# Patient Record
Sex: FEMALE | Race: WHITE | NOT HISPANIC OR LATINO | Employment: OTHER | URBAN - METROPOLITAN AREA
[De-identification: names, ages, dates, MRNs, and addresses within clinical notes are randomized per-mention and may not be internally consistent; named-entity substitution may affect disease eponyms.]

---

## 2017-09-05 ENCOUNTER — HOSPITAL ENCOUNTER (OUTPATIENT)
Dept: MAMMOGRAPHY | Facility: HOSPITAL | Age: 72
Discharge: HOME | End: 2017-09-05
Attending: OBSTETRICS & GYNECOLOGY

## 2017-09-05 DIAGNOSIS — Z12.31 VISIT FOR SCREENING MAMMOGRAM: ICD-10-CM

## 2018-09-14 ENCOUNTER — HOSPITAL ENCOUNTER (OUTPATIENT)
Dept: MAMMOGRAPHY | Facility: CLINIC | Age: 73
Discharge: HOME | End: 2018-09-14
Attending: OBSTETRICS & GYNECOLOGY

## 2018-09-14 DIAGNOSIS — Z12.31 VISIT FOR SCREENING MAMMOGRAM: ICD-10-CM

## 2018-10-30 ENCOUNTER — HOSPITAL ENCOUNTER (INPATIENT)
Facility: CLINIC | Age: 73
LOS: 1 days | Discharge: HOME | DRG: 812 | End: 2018-11-01
Attending: NURSE PRACTITIONER | Admitting: FAMILY MEDICINE
Payer: MEDICARE

## 2018-10-30 ENCOUNTER — APPOINTMENT (OUTPATIENT)
Dept: GENERAL RADIOLOGY | Facility: CLINIC | Age: 73
DRG: 812 | End: 2018-10-30
Attending: NURSE PRACTITIONER
Payer: MEDICARE

## 2018-10-30 ENCOUNTER — APPOINTMENT (OUTPATIENT)
Dept: CT IMAGING | Facility: CLINIC | Age: 73
DRG: 812 | End: 2018-10-30
Attending: NURSE PRACTITIONER
Payer: MEDICARE

## 2018-10-30 DIAGNOSIS — S80.12XA TRAUMATIC HEMATOMA OF LEFT LOWER LEG, INITIAL ENCOUNTER: Primary | ICD-10-CM

## 2018-10-30 DIAGNOSIS — R55 SYNCOPE, UNSPECIFIED SYNCOPE TYPE: ICD-10-CM
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
## 2018-10-30 LAB
ANION GAP SERPL CALCULATED.3IONS-SCNC: 8 MMOL/L (ref 3–14)
BASOPHILS # BLD AUTO: 0 10E9/L (ref 0–0.2)
BASOPHILS NFR BLD AUTO: 0.4 %
BUN SERPL-MCNC: 15 MG/DL (ref 7–30)
CALCIUM SERPL-MCNC: 8 MG/DL (ref 8.5–10.1)
CHLORIDE SERPL-SCNC: 106 MMOL/L (ref 94–109)
CO2 SERPL-SCNC: 24 MMOL/L (ref 20–32)
CREAT SERPL-MCNC: 0.69 MG/DL (ref 0.52–1.04)
DIFFERENTIAL METHOD BLD: ABNORMAL
EOSINOPHIL # BLD AUTO: 0.1 10E9/L (ref 0–0.7)
EOSINOPHIL NFR BLD AUTO: 1 %
ERYTHROCYTE [DISTWIDTH] IN BLOOD BY AUTOMATED COUNT: 13.5 % (ref 10–15)
GFR SERPL CREATININE-BSD FRML MDRD: 83 ML/MIN/1.7M2
GLUCOSE SERPL-MCNC: 184 MG/DL (ref 70–99)
HCT VFR BLD AUTO: 34 % (ref 35–47)
HGB BLD-MCNC: 11.1 G/DL (ref 11.7–15.7)
IMM GRANULOCYTES # BLD: 0.1 10E9/L (ref 0–0.4)
IMM GRANULOCYTES NFR BLD: 0.5 %
LYMPHOCYTES # BLD AUTO: 2 10E9/L (ref 0.8–5.3)
LYMPHOCYTES NFR BLD AUTO: 17.9 %
MCH RBC QN AUTO: 27.8 PG (ref 26.5–33)
MCHC RBC AUTO-ENTMCNC: 32.6 G/DL (ref 31.5–36.5)
MCV RBC AUTO: 85 FL (ref 78–100)
MONOCYTES # BLD AUTO: 0.6 10E9/L (ref 0–1.3)
MONOCYTES NFR BLD AUTO: 5.1 %
NEUTROPHILS # BLD AUTO: 8.2 10E9/L (ref 1.6–8.3)
NEUTROPHILS NFR BLD AUTO: 75.1 %
NRBC # BLD AUTO: 0 10*3/UL
NRBC BLD AUTO-RTO: 0 /100
PLATELET # BLD AUTO: 271 10E9/L (ref 150–450)
POTASSIUM SERPL-SCNC: 4 MMOL/L (ref 3.4–5.3)
RBC # BLD AUTO: 4 10E12/L (ref 3.8–5.2)
SODIUM SERPL-SCNC: 138 MMOL/L (ref 133–144)
WBC # BLD AUTO: 10.9 10E9/L (ref 4–11)

## 2018-10-30 PROCEDURE — 25000128 H RX IP 250 OP 636: Performed by: FAMILY MEDICINE

## 2018-10-30 PROCEDURE — 73502 X-RAY EXAM HIP UNI 2-3 VIEWS: CPT

## 2018-10-30 PROCEDURE — G0378 HOSPITAL OBSERVATION PER HR: HCPCS

## 2018-10-30 PROCEDURE — 87640 STAPH A DNA AMP PROBE: CPT | Performed by: FAMILY MEDICINE

## 2018-10-30 PROCEDURE — 85025 COMPLETE CBC W/AUTO DIFF WBC: CPT | Performed by: NURSE PRACTITIONER

## 2018-10-30 PROCEDURE — 99285 EMERGENCY DEPT VISIT HI MDM: CPT | Mod: 25 | Performed by: NURSE PRACTITIONER

## 2018-10-30 PROCEDURE — A9270 NON-COVERED ITEM OR SERVICE: HCPCS | Mod: GY | Performed by: FAMILY MEDICINE

## 2018-10-30 PROCEDURE — 87641 MR-STAPH DNA AMP PROBE: CPT | Performed by: FAMILY MEDICINE

## 2018-10-30 PROCEDURE — 96361 HYDRATE IV INFUSION ADD-ON: CPT

## 2018-10-30 PROCEDURE — 80048 BASIC METABOLIC PNL TOTAL CA: CPT | Performed by: NURSE PRACTITIONER

## 2018-10-30 PROCEDURE — 99219 ZZC INITIAL OBSERVATION CARE,LEVL II: CPT | Performed by: FAMILY MEDICINE

## 2018-10-30 PROCEDURE — A9270 NON-COVERED ITEM OR SERVICE: HCPCS | Mod: GY | Performed by: NURSE PRACTITIONER

## 2018-10-30 PROCEDURE — 99285 EMERGENCY DEPT VISIT HI MDM: CPT | Mod: Z6 | Performed by: NURSE PRACTITIONER

## 2018-10-30 PROCEDURE — 25000132 ZZH RX MED GY IP 250 OP 250 PS 637: Mod: GY | Performed by: NURSE PRACTITIONER

## 2018-10-30 PROCEDURE — 25000132 ZZH RX MED GY IP 250 OP 250 PS 637: Mod: GY | Performed by: FAMILY MEDICINE

## 2018-10-30 PROCEDURE — 25000128 H RX IP 250 OP 636: Performed by: NURSE PRACTITIONER

## 2018-10-30 PROCEDURE — 72192 CT PELVIS W/O DYE: CPT

## 2018-10-30 PROCEDURE — 96374 THER/PROPH/DIAG INJ IV PUSH: CPT | Performed by: NURSE PRACTITIONER

## 2018-10-30 PROCEDURE — 96361 HYDRATE IV INFUSION ADD-ON: CPT | Performed by: NURSE PRACTITIONER

## 2018-10-30 PROCEDURE — 96376 TX/PRO/DX INJ SAME DRUG ADON: CPT

## 2018-10-30 RX ORDER — PANTOPRAZOLE SODIUM 40 MG/1
40 TABLET, DELAYED RELEASE ORAL AT BEDTIME
Status: DISCONTINUED | OUTPATIENT
Start: 2018-10-30 | End: 2018-11-01 | Stop reason: HOSPADM

## 2018-10-30 RX ORDER — OXYCODONE HYDROCHLORIDE 5 MG/1
5 TABLET ORAL ONCE
Status: COMPLETED | OUTPATIENT
Start: 2018-10-30 | End: 2018-10-30

## 2018-10-30 RX ORDER — POLYETHYLENE GLYCOL 3350 17 G/17G
17 POWDER, FOR SOLUTION ORAL DAILY PRN
Status: DISCONTINUED | OUTPATIENT
Start: 2018-10-30 | End: 2018-11-01 | Stop reason: HOSPADM

## 2018-10-30 RX ORDER — ONDANSETRON 2 MG/ML
4 INJECTION INTRAMUSCULAR; INTRAVENOUS EVERY 6 HOURS PRN
Status: DISCONTINUED | OUTPATIENT
Start: 2018-10-30 | End: 2018-11-01 | Stop reason: HOSPADM

## 2018-10-30 RX ORDER — NALOXONE HYDROCHLORIDE 0.4 MG/ML
.1-.4 INJECTION, SOLUTION INTRAMUSCULAR; INTRAVENOUS; SUBCUTANEOUS
Status: DISCONTINUED | OUTPATIENT
Start: 2018-10-30 | End: 2018-10-30

## 2018-10-30 RX ORDER — ACETAMINOPHEN 325 MG/1
975 TABLET ORAL ONCE
Status: COMPLETED | OUTPATIENT
Start: 2018-10-30 | End: 2018-10-30

## 2018-10-30 RX ORDER — PROCHLORPERAZINE 25 MG
12.5 SUPPOSITORY, RECTAL RECTAL EVERY 12 HOURS PRN
Status: DISCONTINUED | OUTPATIENT
Start: 2018-10-30 | End: 2018-11-01 | Stop reason: HOSPADM

## 2018-10-30 RX ORDER — SODIUM CHLORIDE, SODIUM LACTATE, POTASSIUM CHLORIDE, CALCIUM CHLORIDE 600; 310; 30; 20 MG/100ML; MG/100ML; MG/100ML; MG/100ML
INJECTION, SOLUTION INTRAVENOUS CONTINUOUS
Status: DISCONTINUED | OUTPATIENT
Start: 2018-10-30 | End: 2018-11-01 | Stop reason: HOSPADM

## 2018-10-30 RX ORDER — TRAMADOL HYDROCHLORIDE 50 MG/1
50 TABLET ORAL EVERY 6 HOURS PRN
Status: DISCONTINUED | OUTPATIENT
Start: 2018-10-30 | End: 2018-11-01 | Stop reason: HOSPADM

## 2018-10-30 RX ORDER — NALOXONE HYDROCHLORIDE 0.4 MG/ML
.1-.4 INJECTION, SOLUTION INTRAMUSCULAR; INTRAVENOUS; SUBCUTANEOUS
Status: DISCONTINUED | OUTPATIENT
Start: 2018-10-30 | End: 2018-11-01 | Stop reason: HOSPADM

## 2018-10-30 RX ORDER — CALCIUM CARBONATE 500 MG/1
500-1000 TABLET, CHEWABLE ORAL 3 TIMES DAILY PRN
Status: DISCONTINUED | OUTPATIENT
Start: 2018-10-30 | End: 2018-11-01 | Stop reason: HOSPADM

## 2018-10-30 RX ORDER — ACETAMINOPHEN 325 MG/1
650 TABLET ORAL EVERY 4 HOURS PRN
Status: DISCONTINUED | OUTPATIENT
Start: 2018-10-30 | End: 2018-11-01 | Stop reason: HOSPADM

## 2018-10-30 RX ORDER — KETOROLAC TROMETHAMINE 15 MG/ML
15 INJECTION, SOLUTION INTRAMUSCULAR; INTRAVENOUS ONCE
Status: COMPLETED | OUTPATIENT
Start: 2018-10-30 | End: 2018-10-30

## 2018-10-30 RX ORDER — PROCHLORPERAZINE MALEATE 5 MG
5 TABLET ORAL EVERY 6 HOURS PRN
Status: DISCONTINUED | OUTPATIENT
Start: 2018-10-30 | End: 2018-11-01 | Stop reason: HOSPADM

## 2018-10-30 RX ORDER — ONDANSETRON 4 MG/1
4 TABLET, ORALLY DISINTEGRATING ORAL EVERY 6 HOURS PRN
Status: DISCONTINUED | OUTPATIENT
Start: 2018-10-30 | End: 2018-11-01 | Stop reason: HOSPADM

## 2018-10-30 RX ORDER — PANTOPRAZOLE SODIUM 40 MG/1
40 TABLET, DELAYED RELEASE ORAL
Status: DISCONTINUED | OUTPATIENT
Start: 2018-10-31 | End: 2018-10-30

## 2018-10-30 RX ORDER — KETOROLAC TROMETHAMINE 15 MG/ML
15 INJECTION, SOLUTION INTRAMUSCULAR; INTRAVENOUS EVERY 6 HOURS PRN
Status: DISPENSED | OUTPATIENT
Start: 2018-10-30 | End: 2018-10-31

## 2018-10-30 RX ADMIN — SODIUM CHLORIDE, POTASSIUM CHLORIDE, SODIUM LACTATE AND CALCIUM CHLORIDE 1000 ML: 600; 310; 30; 20 INJECTION, SOLUTION INTRAVENOUS at 16:14

## 2018-10-30 RX ADMIN — OXYCODONE HYDROCHLORIDE 5 MG: 5 TABLET ORAL at 14:55

## 2018-10-30 RX ADMIN — PANTOPRAZOLE SODIUM 40 MG: 40 TABLET, DELAYED RELEASE ORAL at 20:59

## 2018-10-30 RX ADMIN — SODIUM CHLORIDE, POTASSIUM CHLORIDE, SODIUM LACTATE AND CALCIUM CHLORIDE: 600; 310; 30; 20 INJECTION, SOLUTION INTRAVENOUS at 21:27

## 2018-10-30 RX ADMIN — SODIUM CHLORIDE, POTASSIUM CHLORIDE, SODIUM LACTATE AND CALCIUM CHLORIDE 1000 ML: 600; 310; 30; 20 INJECTION, SOLUTION INTRAVENOUS at 18:57

## 2018-10-30 RX ADMIN — KETOROLAC TROMETHAMINE 15 MG: 15 INJECTION, SOLUTION INTRAMUSCULAR; INTRAVENOUS at 22:25

## 2018-10-30 RX ADMIN — KETOROLAC TROMETHAMINE 15 MG: 15 INJECTION, SOLUTION INTRAMUSCULAR; INTRAVENOUS at 19:01

## 2018-10-30 RX ADMIN — ACETAMINOPHEN 650 MG: 325 TABLET, FILM COATED ORAL at 18:57

## 2018-10-30 RX ADMIN — CALCIUM CARBONATE (ANTACID) CHEW TAB 500 MG 1000 MG: 500 CHEW TAB at 21:19

## 2018-10-30 RX ADMIN — ACETAMINOPHEN 650 MG: 325 TABLET, FILM COATED ORAL at 21:19

## 2018-10-30 ASSESSMENT — ENCOUNTER SYMPTOMS
WOUND: 1
COUGH: 0
CHILLS: 0
FEVER: 0
DIFFICULTY URINATING: 0
FATIGUE: 0
NAUSEA: 0
CONFUSION: 0
VOMITING: 0
ABDOMINAL PAIN: 0
DIARRHEA: 0
SORE THROAT: 0
DYSURIA: 0
WHEEZING: 0
CONSTIPATION: 0
SHORTNESS OF BREATH: 0
EYE PAIN: 0

## 2018-10-30 ASSESSMENT — PAIN DESCRIPTION - DESCRIPTORS: DESCRIPTORS: ACHING

## 2018-10-30 NOTE — ED NOTES
"1600 pt called and reports she is feeling nauseated, on arrival to room pt report \" I feel like I am dying\" pt pale and diaphoretic. BP decreased. Pt has increased swelling of hip. Pt changed into gown. Provider called to reassess. IV started labs collected and IVF hanging. BP improved. Will monitor  "

## 2018-10-30 NOTE — ED AVS SNAPSHOT
[facility] Emergency Department    [address] [place] [address]    Phone:  [phone]    Fax:  [phone]                                       Jeanna Smith   MRN: [id number]    Department:  [facility] Emergency Department   Date of Visit:  10/30/2018           Patient Information     Date Of Birth          1945        Your diagnoses for this visit were:     Traumatic hematoma of left lower leg, initial encounter        You were seen by Cayla Simental APRN CNP.      Follow-up Information     Follow up with Jung Pacheco In 3 days.    Specialty:  Internal Medicine    Why:  For wound re-check    Contact information:    [facility]  [address] [place] [address]  [phone]          Discharge Instructions         Lower Extremity Contusion  You have a contusion (bruise) of a lower extremity (leg, knee, ankle, foot, or toe). Symptoms include pain, swelling, and skin discoloration. No bones are broken. This injury may take from a few days to a few weeks to heal.  During that time, the bruise may change from reddish in color, to purple-blue, to green-yellow, to yellow-brown.  Home care    Unless another medicine was prescribed, you can take acetaminophen, ibuprofen, or naproxen to control pain. (If you have chronic liver or kidney disease or ever had a stomach ulcer or gastrointestinal bleeding, talk with your doctor before using these medicines.)    Elevate the injured area to reduce pain and swelling. As much as possible, sit or lie down with the injured area raised about the level of your heart. This is especially important during the first 48 hours.    Ice the injured area to help reduce pain and swelling. Wrap a cold source (ice pack or ice cubes in a plastic bag) in a thin towel. Apply to the bruised area for 20 minutes every 1 to 2 hours the first day. Continue this 3 to 4 times a day until the pain and swelling goes away.    If crutches have  been advised, do not bear full weight on the injured leg until you can do so without pain. You may return to sports when you are able to put full weight and impact on the injured leg without pain.  Follow up  Follow up with your healthcare provider or our staff as advised. Call if you are not improving within the next 1 to 2 weeks.  When to seek medical advice   Call your healthcare provider right away if any of these occur:    Increased pain or swelling    Foot or toes become cold, blue, numb or tingly    Signs of infection: Warmth, drainage, or increased redness or pain around the injury    Inability to move the injured area     Frequent bruising for unknown reasons  Date Last Reviewed: 2/1/2017 2000-2017 [employer]. [address] [place] [address], [place] [address]. All rights reserved. This information is not intended as a substitute for professional medical care. Always follow your healthcare professional's instructions.          24 Hour Appointment Hotline       To make an appointment at any [facility], call [phone] ([phone]). If you don't have a family doctor or clinic, we will help you find one. [facility] are conveniently located to serve the needs of you and your family.             Review of your medicines      Our records show that you are taking the medicines listed below. If these are incorrect, please call your family doctor or clinic.        Dose / Directions Last dose taken    VALERY THYROID 60 MG tablet   Generic drug:  thyroid        1 TABLET DAILY   Refills:  0        CALCIUM + D PO   Dose:  1 tablet        Take 1 tablet by mouth every evening   Refills:  0        IRON PO   Dose:  1 tablet        Take 1 tablet by mouth every evening   Refills:  0        MULTIVITAMIN TABS   OR        1 TABLET DAILY   Refills:  0        OMEGA-3 PO   Dose:  1 capsule        Take 1 capsule by mouth daily   Refills:  0        PANTOPRAZOLE SODIUM PO   Dose:  40 mg        Take 40 mg by  mouth every morning (before breakfast)   Refills:  0        vitamin B-Complex   Dose:  1 tablet        Take 1 tablet by mouth daily   Refills:  0                Procedures and tests performed during your visit     CBC with platelets differential    CT Pelvis Bone wo Contrast    Pelvis XR w/ unilateral hip left      Orders Needing Specimen Collection     None      Pending Results     No orders found from 10/28/2018 to 10/31/2018.            Pending Culture Results     No orders found from 10/28/2018 to 10/31/2018.            Pending Results Instructions     If you had any lab results that were not finalized at the time of your Discharge, you can call the ED Lab Result RN at [phone]. You will be contacted by this team for any positive Lab results or changes in treatment. The nurses are available 7 days a week from 10A to 6:30P.  You can leave a message 24 hours per day and they will return your call.        Test Results From Your Hospital Stay        10/30/2018  3:19 PM      Narrative     PELVIS AND HIP LEFT ONE VIEW October 30, 2018 2:11 PM     HISTORY: Fall this morning.     COMPARISON: None.        Impression     IMPRESSION: No evidence of acute fracture or subluxation. Joint spaces  appear fairly well-preserved.    BINH MEI MD         10/30/2018  4:27 PM      Component Results     Component Value Ref Range & Units Status    WBC 10.9 4.0 - 11.0 10e9/L Final    RBC Count 4.00 3.8 - 5.2 10e12/L Final    Hemoglobin 11.1 (L) 11.7 - 15.7 g/dL Final    Hematocrit 34.0 (L) 35.0 - 47.0 % Final    MCV 85 78 - 100 fl Final    MCH 27.8 26.5 - 33.0 pg Final    MCHC 32.6 31.5 - 36.5 g/dL Final    RDW 13.5 10.0 - 15.0 % Final    Platelet Count 271 150 - 450 10e9/L Final    Diff Method Automated Method  Final    % Neutrophils 75.1 % Final    % Lymphocytes 17.9 % Final    % Monocytes 5.1 % Final    % Eosinophils 1.0 % Final    % Basophils 0.4 % Final    % Immature Granulocytes 0.5 % Final    Nucleated RBCs 0 0 /100 Final     Absolute Neutrophil 8.2 1.6 - 8.3 10e9/L Final    Absolute Lymphocytes 2.0 0.8 - 5.3 10e9/L Final    Absolute Monocytes 0.6 0.0 - 1.3 10e9/L Final    Absolute Eosinophils 0.1 0.0 - 0.7 10e9/L Final    Absolute Basophils 0.0 0.0 - 0.2 10e9/L Final    Abs Immature Granulocytes 0.1 0 - 0.4 10e9/L Final    Absolute Nucleated RBC 0.0  Final         10/30/2018  6:14 PM      Narrative     CT PELVIS WITHOUT CONTRAST   10/30/2018 5:07 PM    HISTORY: Patient fell and injured left hip. There is a large hematoma  noted on left lateral thigh, measuring approximately 6 cm x 10 cm.  Evaluate for bleeding and fracture of left femur/pelvis.     COMPARISON: Radiographs earlier today.    TECHNIQUE: Routine CT imaging is performed through the pelvis without  contrast. Radiation dose for this scan was reduced using automated  exposure control, adjustment of the mA and/or kV according to patient  size, or iterative reconstruction technique.     FINDINGS: No fracture or acute osseous abnormality is demonstrated.  There are degenerative changes in both hips and the visualized lower  lumbar spine. There is a large soft tissue mass in the subcutaneous  tissues lateral to the proximal left femur. This measures  approximately 14 cm craniocaudal x 11 cm AP x 6.5 cm transverse and is  consistent with a hematoma. There is edema in the adjacent  subcutaneous tissues. There is calcification in the vascular  structures. No other soft tissue abnormality is identified.        Impression     IMPRESSION:  1. No fracture is demonstrated.  2. Large hematoma in the subcutaneous tissues lateral to the proximal  left femur measuring approximately 14 cm in greatest dimension.    IVETTE CROCKER MD                Thank you for choosing [facility]       Thank you for choosing [facility] for your care. Our goal is always to provide you with excellent care. Hearing back from our patients is one way we can continue to improve our services. Please take a few  "minutes to complete the written survey that you may receive in the mail after you visit with us. Thank you!        [facility] [employer] Information     icix lets you send messages to your doctor, view your test results, renew your prescriptions, schedule appointments and more. To sign up, go to [email or web address] . Click on \"Log in\" on the left side of the screen, which will take you to the Welcome page. Then click on \"Sign up Now\" on the right side of the page.     You will be asked to enter the access code listed below, as well as some personal information. Please follow the directions to create your username and password.     Your access code is: [email or web address]  Expires: 2019  6:31 PM     Your access code will  in 90 days. If you need help or a new code, please call your [facility] clinic or [phone].        Care EveryWhere ID     This is your Care EveryWhere ID. This could be used by other organizations to access your [facility] medical records  [id number]        Equal Access to Services     [facility]: Hadii gailna Choudhary, waaxda luzaheer, qaybta kaalmapj brody, low iniguez . So [facility] [phone].    ATENCIÓN: Si habla español, tiene a kohli disposición servicios gratuitos de asistencia lingüística. Llame al [phone].    We comply with applicable federal civil rights laws and [place] laws. We do not discriminate on the basis of race, color, national origin, age, disability, sex, sexual orientation, or gender identity.            After Visit Summary       This is your record. Keep this with you and show to your community pharmacist(s) and doctor(s) at your next visit.                  "

## 2018-10-30 NOTE — ED AVS SNAPSHOT
[facility] Emergency Department    [address] [address]    Phone:  [phone]    Fax:  [phone]                                       Jeanna Smith   MRN: [id number]    Department:  [facility] Emergency Department   Date of Visit:  10/30/2018           After Visit Summary Signature Page     I have received my discharge instructions, and my questions have been answered. I have discussed any challenges I see with this plan with the nurse or doctor.    ..........................................................................................................................................  Patient/Patient Representative Signature      ..........................................................................................................................................  Patient Representative Print Name and Relationship to Patient    ..................................................               ................................................  Date                                   Time    ..........................................................................................................................................  Reviewed by Signature/Title    ...................................................              ..............................................  Date                                               Time          22EPIC Rev 08/18

## 2018-10-30 NOTE — IP AVS SNAPSHOT
[facility] Intensive Care    [address] [address]    Phone:  [phone]    Fax:  [phone]                                       After Visit Summary   10/30/2018    Jeanna Smith    MRN: [id number]           After Visit Summary Signature Page     I have received my discharge instructions, and my questions have been answered. I have discussed any challenges I see with this plan with the nurse or doctor.    ..........................................................................................................................................  Patient/Patient Representative Signature      ..........................................................................................................................................  Patient Representative Print Name and Relationship to Patient    ..................................................               ................................................  Date                                   Time    ..........................................................................................................................................  Reviewed by Signature/Title    ...................................................              ..............................................  Date                                               Time          22EPIC Rev 08/18

## 2018-10-30 NOTE — IP AVS SNAPSHOT
MRN:[id number]                      After Visit Summary   10/30/2018    Jeanna Smith    MRN: [id number]           Thank you!     Thank you for choosing [facility] for your care. Our goal is always to provide you with excellent care. Hearing back from our patients is one way we can continue to improve our services. Please take a few minutes to complete the written survey that you may receive in the mail after you visit with us. Thank you!        Patient Information     Date Of Birth          1945        Designated Caregiver       Most Recent Value    Caregiver    Will someone help with your care after discharge? no      About your hospital stay     You were admitted on:  October 30, 2018 You last received care in the:  [facility] Intensive Care    You were discharged on:  November 1, 2018       Who to Call     For medical emergencies, please call 911.  For non-urgent questions about your medical care, please call your primary care provider or clinic, [phone]          Attending Provider     Provider Specialty    Cayla Simental APRN CNP Nurse Practitioner    Javy Palafox MD Family Practice    HoskinsRigoberto escobar MD Internal Medicine       Primary Care Provider Office Phone # Fax #    Jung Pacheco [phone]      After Care Instructions     Activity       Your activity upon discharge: activity as tolerated            Diet       Follow this diet upon discharge: Orders Placed This Encounter      Regular Diet Adult                  Follow-up Appointments     Follow-up and recommended labs and tests        Follow up with primary care provider, JUNG PACHCEO, within 1-2 weeks, for hospital follow- up. The following labs/tests are recommended: cbc.                  Further instructions from your care team         You are scheduled to follow up with your primary care provider, Dr. Jung Pacheco at 9:00am on Thursday, November 15th at the [facility].  [facility] [address] [facility] in [facility]. Please call if you have questions or need to reschedule at [phone]              Lower Extremity Contusion  You have a contusion (bruise) of a lower extremity (leg, knee, ankle, foot, or toe). Symptoms include pain, swelling, and skin discoloration. No bones are broken. This injury may take from a few days to a few weeks to heal.  During that time, the bruise may change from reddish in color, to purple-blue, to green-yellow, to yellow-brown.  Home care    Unless another medicine was prescribed, you can take acetaminophen, ibuprofen, or naproxen to control pain. (If you have chronic liver or kidney disease or ever had a stomach ulcer or gastrointestinal bleeding, talk with your doctor before using these medicines.)    Elevate the injured area to reduce pain and swelling. As much as possible, sit or lie down with the injured area raised about the level of your heart. This is especially important during the first 48 hours.    Ice the injured area to help reduce pain and swelling. Wrap a cold source (ice pack or ice cubes in a plastic bag) in a thin towel. Apply to the bruised area for 20 minutes every 1 to 2 hours the first day. Continue this 3 to 4 times a day until the pain and swelling goes away.    If crutches have been advised, do not bear full weight on the injured leg until you can do so without pain. You may return to sports when you are able to put full weight and impact on the injured leg without pain.  Follow up  Follow up with your healthcare provider or our staff as advised. Call if you are not improving within the next 1 to 2 weeks.  When to seek medical advice   Call your healthcare provider right away if any of these occur:    Increased pain or swelling    Foot or toes become cold, blue, numb or tingly    Signs of infection: Warmth, drainage, or increased redness or pain around the injury    Inability to move the injured area     Frequent  "bruising for unknown reasons  Date Last Reviewed: 2017-2017 [employer]. [address] [place] [address], [place] [address]. All rights reserved. This information is not intended as a substitute for professional medical care. Always follow your healthcare professional's instructions.          Pending Results     No orders found from 10/28/2018 to 10/31/2018.            Statement of Approval     Ordered          18 0825  I have reviewed and agree with all the recommendations and orders detailed in this document.  EFFECTIVE NOW     Approved and electronically signed by:  Rigoberto Hoskins MD             Admission Information     Date & Time Provider Department Dept. Phone    10/30/2018 Rigoberto Hoskins MD [facility] Intensive Care [phone]      Your Vitals Were     Blood Pressure Pulse Temperature Respirations Height Weight    119/59 (BP Location: Left arm) 77 98.8  F (37.1  C) (Oral) 16 1.727 m (5' 8\") 73.8 kg (162 lb 11.2 oz)    Pulse Oximetry BMI (Body Mass Index)                95% 24.74 kg/m2          [employer] Information     [employer] lets you send messages to your doctor, view your test results, renew your prescriptions, schedule appointments and more. To sign up, go to [email or web address] . Click on \"Log in\" on the left side of the screen, which will take you to the Welcome page. Then click on \"Sign up Now\" on the right side of the page.     You will be asked to enter the access code listed below, as well as some personal information. Please follow the directions to create your username and password.     Your access code is: [email or web address]  Expires: 2019  6:31 PM     Your access code will  in 90 days. If you need help or a new code, please call your [facility] clinic or [phone].        Care EveryWhere ID     This is your Care EveryWhere ID. This could be used by other organizations to access your [facility] medical records  [id number]        Equal Access to Services     [facility] " GAAR : Hadii aad lizzy pérezkathrin Choudhary, waaxda luqadaha, qaybta kasherinda reymundomellisapj, low streetnatanalejandro santiago. So [facility] [phone].    ATENCIÓN: Si habla español, tiene a kohli disposición servicios gratuitos de asistencia lingüística. Llame al [phone].    We comply with applicable federal civil rights laws and [place] laws. We do not discriminate on the basis of race, color, national origin, age, disability, sex, sexual orientation, or gender identity.               Review of your medicines      CONTINUE these medicines which have NOT CHANGED        Dose / Directions    ARMOUR THYROID 60 MG tablet   Generic drug:  thyroid        1 TABLET DAILY   Refills:  0       CALCIUM + D PO        Dose:  1 tablet   Take 1 tablet by mouth every evening   Refills:  0       IRON PO        Dose:  1 tablet   Take 1 tablet by mouth every evening   Refills:  0       MULTIVITAMIN TABS   OR        1 TABLET DAILY   Refills:  0       OMEGA-3 PO        Dose:  1 capsule   Take 1 capsule by mouth daily   Refills:  0       PANTOPRAZOLE SODIUM PO        Dose:  40 mg   Take 40 mg by mouth every morning (before breakfast)   Refills:  0       vitamin B-Complex        Dose:  1 tablet   Take 1 tablet by mouth daily   Refills:  0                Protect others around you: Learn how to safely use, store and throw away your medicines at www.disposemymeds.org.             Medication List: This is a list of all your medications and when to take them. Check marks below indicate your daily home schedule. Keep this list as a reference.      Medications           Morning Afternoon Evening Bedtime As Needed    ARMOUR THYROID 60 MG tablet   1 TABLET DAILY   Generic drug:  thyroid                                CALCIUM + D PO   Take 1 tablet by mouth every evening                                IRON PO   Take 1 tablet by mouth every evening                                MULTIVITAMIN TABS   OR   1 TABLET DAILY                                 OMEGA-3 PO   Take 1 capsule by mouth daily                                PANTOPRAZOLE SODIUM PO   Take 40 mg by mouth every morning (before breakfast)   Last time this was given:  40 mg on 10/31/2018 10:42 PM                                vitamin B-Complex   Take 1 tablet by mouth daily

## 2018-10-30 NOTE — ED NOTES
Attempted to stand pt and road test prior to discharge, pt stool with assist felt dizzy and nauseated.pt sat back on bed. Pt then slumped onto bed and lost consciousness for a brief time. Provider aware.  Pt will now be admitted overnight.

## 2018-10-30 NOTE — LETTER
October 30, 2018      To Whom It May Concern:      Jeanna Smith was seen in our Emergency Department today, 10/30/18.  I expect her condition to improve over the next *** days.  She may return to work/school when improved.    Sincerely,        Marifer Huffman RN

## 2018-10-30 NOTE — ED PROVIDER NOTES
History     Chief Complaint   Patient presents with     Hip Pain     fell this am at 0945 injuring left hip, has increased swelling and is worried about the DVT      HPI  Jeanna Smith is a 73 year old female who admits to past medical history of hypothyroidism who presents to the emergency department with a left leg pain.  Patient states that she was walking over an old trailer that had rotten boards and she was trying to step on the cross beams and somehow slipped and fell hitting her left lateral thigh.  Patient reports immediate pain.  Patient denies problem with ambulation and denies bony tenderness.  Pt reports she is certain she does not have a fracture but reports pain and bruising is severe sharp stabbing where the bruise is.      Problem List:    Patient Active Problem List    Diagnosis Date Noted     CARDIOVASCULAR SCREENING; LDL GOAL LESS THAN 160 10/31/2010     Priority: Medium     Contact dermatitis of eyelid 11/07/2009     Priority: Medium     UTI (urinary tract infection) 11/07/2009     Priority: Medium     Hypothyroidism 11/07/2009     Priority: Medium        Past Medical History:    Past Medical History:   Diagnosis Date     Hypothyroidism        Past Surgical History:    Past Surgical History:   Procedure Laterality Date     C ANESTH,EXCIS RETROPHARYNG TUMOR  2005,2006,2008    fatty tumors removed     C OOPH W/RADIC DISSECT FOR DEBULKING  1993     C VAGINAL HYSTERECTOMY  1988-89     GASTRIC BYPASS  2004     RECTOCELE REPAIR  1990,1992     TONSILLECTOMY  1955       Family History:    Family History   Problem Relation Age of Onset     Arthritis Mother      HEART DISEASE Mother      TIA's     Respiratory Father      emphysema       Social History:  Marital Status:   [5]  Social History   Substance Use Topics     Smoking status: Never Smoker     Smokeless tobacco: Not on file     Alcohol use No        Medications:      [place] THYROID 60 MG OR TABS   Calcium Citrate-Vitamin D (CALCIUM + D  "PO)   IRON PO   MULTIVITAMIN TABS   OR   Omega-3 Fatty Acids (OMEGA-3 PO)   PANTOPRAZOLE SODIUM PO   vitamin B-Complex         Review of Systems   Constitutional: Negative for chills, fatigue and fever.   HENT: Negative for ear pain and sore throat.    Eyes: Negative for pain and visual disturbance.   Respiratory: Negative for cough, shortness of breath and wheezing.    Cardiovascular: Negative for chest pain.   Gastrointestinal: Negative for abdominal pain, constipation, diarrhea, nausea and vomiting.   Genitourinary: Negative for difficulty urinating and dysuria.   Skin: Positive for wound (left lateral thigh bruise/contusion). Negative for rash.   Psychiatric/Behavioral: Negative for confusion.   All other systems reviewed and are negative.      Physical Exam   BP: 137/84  Heart Rate: 70  Temp: 98.1  F (36.7  C)  Resp: 16  Height: 172.7 cm (5' 8\")  Weight: 70.3 kg (155 lb)  SpO2: 98 %      Physical Exam   Constitutional: She is oriented to person, place, and time.   HENT:   Head: Normocephalic and atraumatic.   Eyes: Conjunctivae are normal. Right eye exhibits no discharge. Left eye exhibits no discharge.   Cardiovascular: Normal rate, regular rhythm, normal heart sounds and intact distal pulses.  Exam reveals no gallop and no friction rub.    No murmur heard.  Pulmonary/Chest: Effort normal and breath sounds normal. No respiratory distress. She has no wheezes. She has no rales. She exhibits no tenderness.   Musculoskeletal: Normal range of motion.        Left hip: She exhibits tenderness (left lateral thigh where hematoma is present) and swelling. She exhibits normal range of motion, normal strength, no bony tenderness, no crepitus, no deformity and no laceration.   Normal strength and pulses  - bilateral lower extremities   Neurological: She is alert and oriented to person, place, and time.   Skin: Skin is warm and dry. Abrasion (3 cm abrasion noted slighlty distal to patella without surrounding swelling) and " bruising noted. No rash noted. No erythema. No pallor.        Psychiatric: She has a normal mood and affect.   Nursing note and vitals reviewed.      ED Course     ED Course     Procedures      Results for orders placed or performed during the hospital encounter of 10/30/18 (from the past 24 hour(s))   Pelvis XR w/ unilateral hip left    Narrative    PELVIS AND HIP LEFT ONE VIEW October 30, 2018 2:11 PM     HISTORY: Fall this morning.     COMPARISON: None.      Impression    IMPRESSION: No evidence of acute fracture or subluxation. Joint spaces  appear fairly well-preserved.    BINH MEI MD   CBC with platelets differential   Result Value Ref Range    WBC 10.9 4.0 - 11.0 10e9/L    RBC Count 4.00 3.8 - 5.2 10e12/L    Hemoglobin 11.1 (L) 11.7 - 15.7 g/dL    Hematocrit 34.0 (L) 35.0 - 47.0 %    MCV 85 78 - 100 fl    MCH 27.8 26.5 - 33.0 pg    MCHC 32.6 31.5 - 36.5 g/dL    RDW 13.5 10.0 - 15.0 %    Platelet Count 271 150 - 450 10e9/L    Diff Method Automated Method     % Neutrophils 75.1 %    % Lymphocytes 17.9 %    % Monocytes 5.1 %    % Eosinophils 1.0 %    % Basophils 0.4 %    % Immature Granulocytes 0.5 %    Nucleated RBCs 0 0 /100    Absolute Neutrophil 8.2 1.6 - 8.3 10e9/L    Absolute Lymphocytes 2.0 0.8 - 5.3 10e9/L    Absolute Monocytes 0.6 0.0 - 1.3 10e9/L    Absolute Eosinophils 0.1 0.0 - 0.7 10e9/L    Absolute Basophils 0.0 0.0 - 0.2 10e9/L    Abs Immature Granulocytes 0.1 0 - 0.4 10e9/L    Absolute Nucleated RBC 0.0    CT Pelvis Bone wo Contrast    Narrative    CT PELVIS WITHOUT CONTRAST   10/30/2018 5:07 PM    HISTORY: Patient fell and injured left hip. There is a large hematoma  noted on left lateral thigh, measuring approximately 6 cm x 10 cm.  Evaluate for bleeding and fracture of left femur/pelvis.     COMPARISON: Radiographs earlier today.    TECHNIQUE: Routine CT imaging is performed through the pelvis without  contrast. Radiation dose for this scan was reduced using automated  exposure control,  adjustment of the mA and/or kV according to patient  size, or iterative reconstruction technique.     FINDINGS: No fracture or acute osseous abnormality is demonstrated.  There are degenerative changes in both hips and the visualized lower  lumbar spine. There is a large soft tissue mass in the subcutaneous  tissues lateral to the proximal left femur. This measures  approximately 14 cm craniocaudal x 11 cm AP x 6.5 cm transverse and is  consistent with a hematoma. There is edema in the adjacent  subcutaneous tissues. There is calcification in the vascular  structures. No other soft tissue abnormality is identified.      Impression    IMPRESSION:  1. No fracture is demonstrated.  2. Large hematoma in the subcutaneous tissues lateral to the proximal  left femur measuring approximately 14 cm in greatest dimension.    IVETTE CROCEKR MD       Medications   lactated ringers BOLUS 1,000 mL (1,000 mLs Intravenous New Bag 10/30/18 1857)   oxyCODONE IR (ROXICODONE) tablet 5 mg (5 mg Oral Given 10/30/18 1455)   lactated ringers BOLUS 1,000 mL (0 mLs Intravenous Stopped 10/30/18 1700)   acetaminophen (TYLENOL) tablet 975 mg (650 mg Oral Given 10/30/18 1857)   ketorolac (TORADOL) injection 15 mg (15 mg Intravenous Given 10/30/18 1901)       Assessments & Plan (with Medical Decision Making)     I have reviewed the nursing notes.    I have reviewed the findings, diagnosis, plan and need for follow up with the patient.  Jeanna Smith is a 73 year old female presenting to the emergency department with a slip and fall injury.  Pain is located in the left lateral hip region.  There is no bony tenderness noted.  Patient reports moderate amounts of swelling and a stabbing type pain sensation.  Exam as noted above and concern for excessive bleeding from hematoma but initially ordered x-ray to rule out fracture.  Patient reported pain that is severe and she was subsequently given oxycodone.  Patient developed a vasovagal type  response suspected from the oxycodone with nausea and feeling flushed and lightheaded.  Patient given a liter of fluids and declined antiemetics and was feeling better.  Patient then sent for a CT scan pelvis to evaluate for active bleeding in the hematoma due to the prominent size of the hematoma.  Dr. Marcus Ceron was consulted with this and he was in agreement with plan for this CT scan.  The CT scan reveals no fractures and a 14 cm x 11 cm x 6 cm soft tissue swelling consistent with a hematoma.  Reviewed these findings with patient.  Patient slightly ambivalent due to the reaction to the oxycodone about going home.  She was given time to review and think about whether she would be able to care for herself at home.  She requested some soup to see if this would make her feel better.  Subsequently patient decided she would try to go home.  When she got up she reported severe pain in her left leg and then subsequently had a syncopal episode.  Due to the syncopal episode in the uncontrolled pain management Dr. Palafox was consulted and agreed to admit for observation.  Dr. Jaswant Milligan was consulted and in collaboration with [facility] as well.  New Prescriptions    No medications on file       Final diagnoses:   Traumatic hematoma of left lower leg, initial encounter   Syncope, unspecified syncope type       10/30/2018   [facility] EMERGENCY DEPARTMENT     Cayla Simental, ARLENE CNP  10/30/18 1907

## 2018-10-31 PROBLEM — T14.8XXA HEMATOMA: Status: ACTIVE | Noted: 2018-10-31

## 2018-10-31 LAB
ABO + RH BLD: NORMAL
ABO + RH BLD: NORMAL
ANION GAP SERPL CALCULATED.3IONS-SCNC: 5 MMOL/L (ref 3–14)
BASOPHILS # BLD AUTO: 0 10E9/L (ref 0–0.2)
BASOPHILS NFR BLD AUTO: 0.3 %
BLD GP AB SCN SERPL QL: NORMAL
BLD PROD TYP BPU: NORMAL
BLD UNIT ID BPU: 0
BLD UNIT ID BPU: 0
BLOOD BANK CMNT PATIENT-IMP: NORMAL
BLOOD PRODUCT CODE: NORMAL
BLOOD PRODUCT CODE: NORMAL
BPU ID: NORMAL
BPU ID: NORMAL
BUN SERPL-MCNC: 12 MG/DL (ref 7–30)
CALCIUM SERPL-MCNC: 7.5 MG/DL (ref 8.5–10.1)
CHLORIDE SERPL-SCNC: 106 MMOL/L (ref 94–109)
CO2 SERPL-SCNC: 30 MMOL/L (ref 20–32)
CREAT SERPL-MCNC: 0.78 MG/DL (ref 0.52–1.04)
DIFFERENTIAL METHOD BLD: ABNORMAL
EOSINOPHIL # BLD AUTO: 0.1 10E9/L (ref 0–0.7)
EOSINOPHIL NFR BLD AUTO: 0.9 %
ERYTHROCYTE [DISTWIDTH] IN BLOOD BY AUTOMATED COUNT: 13.6 % (ref 10–15)
GFR SERPL CREATININE-BSD FRML MDRD: 73 ML/MIN/1.7M2
GLUCOSE SERPL-MCNC: 97 MG/DL (ref 70–99)
HCT VFR BLD AUTO: 24.2 % (ref 35–47)
HGB BLD-MCNC: 7.8 G/DL (ref 11.7–15.7)
HGB BLD-MCNC: 9.2 G/DL (ref 11.7–15.7)
HGB BLD-MCNC: 9.9 G/DL (ref 11.7–15.7)
IMM GRANULOCYTES # BLD: 0 10E9/L (ref 0–0.4)
IMM GRANULOCYTES NFR BLD: 0.4 %
LYMPHOCYTES # BLD AUTO: 1.6 10E9/L (ref 0.8–5.3)
LYMPHOCYTES NFR BLD AUTO: 21.1 %
MCH RBC QN AUTO: 27.6 PG (ref 26.5–33)
MCHC RBC AUTO-ENTMCNC: 32.2 G/DL (ref 31.5–36.5)
MCV RBC AUTO: 86 FL (ref 78–100)
MONOCYTES # BLD AUTO: 0.5 10E9/L (ref 0–1.3)
MONOCYTES NFR BLD AUTO: 7.2 %
MRSA DNA SPEC QL NAA+PROBE: NEGATIVE
NEUTROPHILS # BLD AUTO: 5.2 10E9/L (ref 1.6–8.3)
NEUTROPHILS NFR BLD AUTO: 70.1 %
NRBC # BLD AUTO: 0 10*3/UL
NRBC BLD AUTO-RTO: 0 /100
NUM BPU REQUESTED: 2
PLATELET # BLD AUTO: 211 10E9/L (ref 150–450)
POTASSIUM SERPL-SCNC: 3.6 MMOL/L (ref 3.4–5.3)
RBC # BLD AUTO: 2.83 10E12/L (ref 3.8–5.2)
SODIUM SERPL-SCNC: 141 MMOL/L (ref 133–144)
SPECIMEN EXP DATE BLD: NORMAL
SPECIMEN SOURCE: NORMAL
TRANSFUSION STATUS PATIENT QL: NORMAL
WBC # BLD AUTO: 7.4 10E9/L (ref 4–11)

## 2018-10-31 PROCEDURE — 80048 BASIC METABOLIC PNL TOTAL CA: CPT | Performed by: NURSE PRACTITIONER

## 2018-10-31 PROCEDURE — 25000132 ZZH RX MED GY IP 250 OP 250 PS 637: Mod: GY | Performed by: NURSE PRACTITIONER

## 2018-10-31 PROCEDURE — 85018 HEMOGLOBIN: CPT | Performed by: INTERNAL MEDICINE

## 2018-10-31 PROCEDURE — 86850 RBC ANTIBODY SCREEN: CPT | Performed by: INTERNAL MEDICINE

## 2018-10-31 PROCEDURE — P9016 RBC LEUKOCYTES REDUCED: HCPCS | Performed by: INTERNAL MEDICINE

## 2018-10-31 PROCEDURE — 86900 BLOOD TYPING SEROLOGIC ABO: CPT | Performed by: INTERNAL MEDICINE

## 2018-10-31 PROCEDURE — 36415 COLL VENOUS BLD VENIPUNCTURE: CPT | Performed by: NURSE PRACTITIONER

## 2018-10-31 PROCEDURE — 36415 COLL VENOUS BLD VENIPUNCTURE: CPT | Performed by: INTERNAL MEDICINE

## 2018-10-31 PROCEDURE — A9270 NON-COVERED ITEM OR SERVICE: HCPCS | Mod: GY | Performed by: FAMILY MEDICINE

## 2018-10-31 PROCEDURE — 12000011 ZZH R&B MS OVERFLOW

## 2018-10-31 PROCEDURE — 86923 COMPATIBILITY TEST ELECTRIC: CPT | Performed by: INTERNAL MEDICINE

## 2018-10-31 PROCEDURE — 86901 BLOOD TYPING SEROLOGIC RH(D): CPT | Performed by: INTERNAL MEDICINE

## 2018-10-31 PROCEDURE — G0378 HOSPITAL OBSERVATION PER HR: HCPCS

## 2018-10-31 PROCEDURE — 25000128 H RX IP 250 OP 636: Performed by: FAMILY MEDICINE

## 2018-10-31 PROCEDURE — 25000132 ZZH RX MED GY IP 250 OP 250 PS 637: Mod: GY | Performed by: FAMILY MEDICINE

## 2018-10-31 PROCEDURE — A9270 NON-COVERED ITEM OR SERVICE: HCPCS | Mod: GY | Performed by: NURSE PRACTITIONER

## 2018-10-31 PROCEDURE — 99222 1ST HOSP IP/OBS MODERATE 55: CPT | Performed by: SURGERY

## 2018-10-31 PROCEDURE — 99233 SBSQ HOSP IP/OBS HIGH 50: CPT | Performed by: INTERNAL MEDICINE

## 2018-10-31 PROCEDURE — 96361 HYDRATE IV INFUSION ADD-ON: CPT

## 2018-10-31 PROCEDURE — 85025 COMPLETE CBC W/AUTO DIFF WBC: CPT | Performed by: NURSE PRACTITIONER

## 2018-10-31 RX ADMIN — Medication 1 MG: at 02:55

## 2018-10-31 RX ADMIN — ACETAMINOPHEN 650 MG: 325 TABLET, FILM COATED ORAL at 13:44

## 2018-10-31 RX ADMIN — ACETAMINOPHEN 650 MG: 325 TABLET, FILM COATED ORAL at 02:55

## 2018-10-31 RX ADMIN — SODIUM CHLORIDE, POTASSIUM CHLORIDE, SODIUM LACTATE AND CALCIUM CHLORIDE: 600; 310; 30; 20 INJECTION, SOLUTION INTRAVENOUS at 16:22

## 2018-10-31 RX ADMIN — PANTOPRAZOLE SODIUM 40 MG: 40 TABLET, DELAYED RELEASE ORAL at 22:42

## 2018-10-31 ASSESSMENT — ACTIVITIES OF DAILY LIVING (ADL)
DRESS: 0-->INDEPENDENT
ADLS_ACUITY_SCORE: 17
FALL_HISTORY_WITHIN_LAST_SIX_MONTHS: NO
ADLS_ACUITY_SCORE: 11
SWALLOWING: 0-->SWALLOWS FOODS/LIQUIDS WITHOUT DIFFICULTY
TOILETING: 0-->INDEPENDENT
COGNITION: 0 - NO COGNITION ISSUES REPORTED
RETIRED_EATING: 0-->INDEPENDENT
ADLS_ACUITY_SCORE: 11
AMBULATION: 0-->INDEPENDENT
RETIRED_COMMUNICATION: 0-->UNDERSTANDS/COMMUNICATES WITHOUT DIFFICULTY
TRANSFERRING: 0-->INDEPENDENT
BATHING: 0-->INDEPENDENT
ADLS_ACUITY_SCORE: 11

## 2018-10-31 NOTE — H&P
Admitted:     10/30/2018      CHIEF COMPLAINT:  Fall and left thigh pain.      HISTORY OF PRESENT ILLNESS:  The patient was in her usual state of good health when she was up on an old flatbed trailer and fell through the floor board.  She struck her left lateral hip on a steel beam.  She had immediate pain, was able to walk, but with significant difficulty.  Tried to tolerate it at home, but was in severe pain, and ended up coming into the ED.  The pain was steady, aching, throbbing, severe, worse with activity, better at rest.      In the ED, she was given Tylenol and 1 oxycodone.  She states she has never taken narcotics since she had an intolerance to Darvocet in the past.  She tried to get up and walk to go home and had a syncopal episode.  She is still having some significant headache at this time.  She did not strike her head.      PAST MEDICAL HISTORY:   1.  Hypothyroidism.   2.  Contact dermatitis.   3.  Urinary tract infection.      MEDICATIONS PRIOR TO ADMISSION:   1.  Protonix 40 mg q.a.m.    2.  [place] thyroid 60 mg daily.   3.  Calcium and vitamin D daily.   4.  Iron daily.   5.  Multivite daily.   6.  Omega 3 fatty acids 1 capsule daily.   7.  Vitamin B complex 1 daily.      ALLERGIES:  DARVOCET, UNKNOWN REACTION.  ERYTHROMYCIN CAUSED NAUSEA.  PENICILLIN, UNKNOWN REACTION.      SOCIAL HISTORY:  She lives alone.  She continues to work as a supervisor.  She is a never smoker, rare alcohol.      FAMILY HISTORY:  Mother had heart disease and TIAs.  Father had emphysema.      REVIEW OF SYSTEMS:  Before this episode, a 10-point review of systems was negative.  Since the episode, she has had some headache, as noted, the pain in her leg, some lightheadedness.      PHYSICAL EXAMINATION:   GENERAL:  She is awake, alert, oriented x 3, in no apparent distress while lying on the cot, but when she tries to move, she has significant pain in the left thigh.   VITAL SIGNS:  She is afebrile, pulse 71, blood pressure  128/67, O2 sat 98% on room air, respiratory rate 18.   HEENT:  Eyes show pupils equal and reactive, EOMs intact.  TMs normal.  Nasal mucosa normal.  Throat is normal.   NECK:  Supple, without masses, nodes, or thyromegaly.   CHEST:  Clear to A and P.   CARDIOVASCULAR:  Regular rate and rhythm without murmur, click, or rub.  Pulses are brisk and equal.  No JVD, HJR.  No edema.   ABDOMEN:  Soft, nontender, without HSM or masses.   GENITOURINARY AND RECTAL:  Deferred.   EXTREMITIES:  She has a very large hematoma on the left anterior thigh, making it 2-3 times as large as the other side, extending down to just above the knee, primarily anteriorly, less posteriorly.  CMS is intact.  She has good feeling in the feet and good movement.   NEUROLOGIC:  Otherwise nonfocal.      ASSESSMENT AND PLAN:   1.  Fall with massive hematoma, left thigh.  I suspect she lost at least 3-4 units of blood into that thigh.  She has gotten 2 liters of saline.  Will give her 125 through the night.  Recheck a hemoglobin in a.m. I would expect it to be significantly lower.  This is from a mechanical fall.  No evidence of fracture or internal derangement on CT.   2.  Syncope.  This was likely due to blood loss plus the oxycodone she got that likely vasodilated.  I feel this is a triggered syncope and will not do any further workup, but make sure she is able to get up and move without repeat prior to discharge.  Physical therapy.   3.  Hypothyroidism.  She is on [place] Thyroid and will hold off on giving her that as she is just here 1 day.  Would start it up here if she is needing to stay longer than 24 hours.   4.  Gastroesophageal reflux disease.  We will continue Protonix.   5.  CODE STATUS:  Full.   6.  Prophylaxis:  None.  Low risk.      DISPOSITION:  She is observation and hopefully will be discharged in the next 18 hours.         LINSEY TAVERAS MD             D: 10/30/2018   T: 10/30/2018   MT: MIGUEL ANGEL      Name:     JAKUB STRONG   MRN:       [id number]        Account:      [id number]   :      1945        Admitted:     10/30/2018                   Document: [id number]

## 2018-10-31 NOTE — CONSULTS
PCP:  Jung Pacheco  Requesting : Rigoberto Hoskins MD (hospitalist)    Chief complaint: Left thigh hematoma    History of Present Illness: The patient is a very healthy 73-year-old female who was at work yesterday and was standing in a ventricular that happened to have a before that was unstable. Apparently the wound was rodded and she fell through the floor. Her right leg caught her but her left leg slipped through the hole and she scraped along the lateral aspect of her left thigh. She immediately had some bruising and swelling of the area but she continued to work throughout the day. She eventually came to the emergency room last evening because the pain was becoming unbearable. She had a hemoglobin checked and it was 11. She was admitted to the ICU. No bony injury was detected. A CT scan of the pelvis confirmed the lack of bony injury, and it showed a hematoma that measured approximately 14 x 11 cm. The hematoma is superficial to the muscle and in the subcutaneous fat.    Overnight she's felt well except that when she sits up she gets lightheaded. She has orthostatic vital signs in that her blood pressure dropped into the 60s and 70s when she was upright. Her hemoglobin dropped overnight from 11-7.8, essentially 3 points. She has been started on some blood transfusions because of her orthostatic vital signs and concern for ongoing bleeding.    Past medical history was reviewed and is fairly unremarkable. She is quite healthy. Surgical history was reviewed as below. She's not on any blood thinning medication. She took aspirin yesterday afternoon for treatment of the pain, and to prevent blood clot formation as she was concerned that a blood clot could travel to her heart.    Histories:  Past Medical History:   Diagnosis Date     Hypothyroidism        Past Surgical History:   Procedure Laterality Date     C ANESTH,EXCIS RETROPHARYNG TUMOR  2005,2006,2008    fatty tumors removed     C OOPH W/RADIC DISSECT FOR  DEBULKING  1993     C VAGINAL HYSTERECTOMY  1988-89     GASTRIC BYPASS  2004     RECTOCELE REPAIR  1990,1992     TONSILLECTOMY  1955       Family History   Problem Relation Age of Onset     Arthritis Mother      HEART DISEASE Mother      TIA's     Respiratory Father      emphysema       Social History   Substance Use Topics     Smoking status: Never Smoker     Smokeless tobacco: Not on file     Alcohol use No       No current outpatient prescriptions on file.       Allergies   Allergen Reactions     Darvocet [Acetaminophen]      Erythromycin Nausea     Penicillins        Images:  Recent Results (from the past 744 hour(s))   Pelvis XR w/ unilateral hip left    Narrative    PELVIS AND HIP LEFT ONE VIEW October 30, 2018 2:11 PM     HISTORY: Fall this morning.     COMPARISON: None.      Impression    IMPRESSION: No evidence of acute fracture or subluxation. Joint spaces  appear fairly well-preserved.    BINH MEI MD   CT Pelvis Bone wo Contrast    Narrative    CT PELVIS WITHOUT CONTRAST   10/30/2018 5:07 PM    HISTORY: Patient fell and injured left hip. There is a large hematoma  noted on left lateral thigh, measuring approximately 6 cm x 10 cm.  Evaluate for bleeding and fracture of left femur/pelvis.     COMPARISON: Radiographs earlier today.    TECHNIQUE: Routine CT imaging is performed through the pelvis without  contrast. Radiation dose for this scan was reduced using automated  exposure control, adjustment of the mA and/or kV according to patient  size, or iterative reconstruction technique.     FINDINGS: No fracture or acute osseous abnormality is demonstrated.  There are degenerative changes in both hips and the visualized lower  lumbar spine. There is a large soft tissue mass in the subcutaneous  tissues lateral to the proximal left femur. This measures  approximately 14 cm craniocaudal x 11 cm AP x 6.5 cm transverse and is  consistent with a hematoma. There is edema in the adjacent  subcutaneous tissues.  There is calcification in the vascular  structures. No other soft tissue abnormality is identified.      Impression    IMPRESSION:  1. No fracture is demonstrated.  2. Large hematoma in the subcutaneous tissues lateral to the proximal  left femur measuring approximately 14 cm in greatest dimension.    IVETTE CROCKER MD       Labs:  Results for orders placed or performed during the hospital encounter of 10/30/18   Pelvis XR w/ unilateral hip left    Narrative    PELVIS AND HIP LEFT ONE VIEW October 30, 2018 2:11 PM     HISTORY: Fall this morning.     COMPARISON: None.      Impression    IMPRESSION: No evidence of acute fracture or subluxation. Joint spaces  appear fairly well-preserved.    BINH MEI MD   CT Pelvis Bone wo Contrast    Narrative    CT PELVIS WITHOUT CONTRAST   10/30/2018 5:07 PM    HISTORY: Patient fell and injured left hip. There is a large hematoma  noted on left lateral thigh, measuring approximately 6 cm x 10 cm.  Evaluate for bleeding and fracture of left femur/pelvis.     COMPARISON: Radiographs earlier today.    TECHNIQUE: Routine CT imaging is performed through the pelvis without  contrast. Radiation dose for this scan was reduced using automated  exposure control, adjustment of the mA and/or kV according to patient  size, or iterative reconstruction technique.     FINDINGS: No fracture or acute osseous abnormality is demonstrated.  There are degenerative changes in both hips and the visualized lower  lumbar spine. There is a large soft tissue mass in the subcutaneous  tissues lateral to the proximal left femur. This measures  approximately 14 cm craniocaudal x 11 cm AP x 6.5 cm transverse and is  consistent with a hematoma. There is edema in the adjacent  subcutaneous tissues. There is calcification in the vascular  structures. No other soft tissue abnormality is identified.      Impression    IMPRESSION:  1. No fracture is demonstrated.  2. Large hematoma in the subcutaneous tissues  lateral to the proximal  left femur measuring approximately 14 cm in greatest dimension.    IVETTE CROCKER MD   CBC with platelets differential   Result Value Ref Range    WBC 10.9 4.0 - 11.0 10e9/L    RBC Count 4.00 3.8 - 5.2 10e12/L    Hemoglobin 11.1 (L) 11.7 - 15.7 g/dL    Hematocrit 34.0 (L) 35.0 - 47.0 %    MCV 85 78 - 100 fl    MCH 27.8 26.5 - 33.0 pg    MCHC 32.6 31.5 - 36.5 g/dL    RDW 13.5 10.0 - 15.0 %    Platelet Count 271 150 - 450 10e9/L    Diff Method Automated Method     % Neutrophils 75.1 %    % Lymphocytes 17.9 %    % Monocytes 5.1 %    % Eosinophils 1.0 %    % Basophils 0.4 %    % Immature Granulocytes 0.5 %    Nucleated RBCs 0 0 /100    Absolute Neutrophil 8.2 1.6 - 8.3 10e9/L    Absolute Lymphocytes 2.0 0.8 - 5.3 10e9/L    Absolute Monocytes 0.6 0.0 - 1.3 10e9/L    Absolute Eosinophils 0.1 0.0 - 0.7 10e9/L    Absolute Basophils 0.0 0.0 - 0.2 10e9/L    Abs Immature Granulocytes 0.1 0 - 0.4 10e9/L    Absolute Nucleated RBC 0.0    Basic metabolic panel   Result Value Ref Range    Sodium 138 133 - 144 mmol/L    Potassium 4.0 3.4 - 5.3 mmol/L    Chloride 106 94 - 109 mmol/L    Carbon Dioxide 24 20 - 32 mmol/L    Anion Gap 8 3 - 14 mmol/L    Glucose 184 (H) 70 - 99 mg/dL    Urea Nitrogen 15 7 - 30 mg/dL    Creatinine 0.69 0.52 - 1.04 mg/dL    GFR Estimate 83 >60 mL/min/1.7m2    GFR Estimate If Black >90 >60 mL/min/1.7m2    Calcium 8.0 (L) 8.5 - 10.1 mg/dL   CBC with platelets differential   Result Value Ref Range    WBC 7.4 4.0 - 11.0 10e9/L    RBC Count 2.83 (L) 3.8 - 5.2 10e12/L    Hemoglobin 7.8 (L) 11.7 - 15.7 g/dL    Hematocrit 24.2 (L) 35.0 - 47.0 %    MCV 86 78 - 100 fl    MCH 27.6 26.5 - 33.0 pg    MCHC 32.2 31.5 - 36.5 g/dL    RDW 13.6 10.0 - 15.0 %    Platelet Count 211 150 - 450 10e9/L    Diff Method Automated Method     % Neutrophils 70.1 %    % Lymphocytes 21.1 %    % Monocytes 7.2 %    % Eosinophils 0.9 %    % Basophils 0.3 %    % Immature Granulocytes 0.4 %    Nucleated RBCs 0 0  /100    Absolute Neutrophil 5.2 1.6 - 8.3 10e9/L    Absolute Lymphocytes 1.6 0.8 - 5.3 10e9/L    Absolute Monocytes 0.5 0.0 - 1.3 10e9/L    Absolute Eosinophils 0.1 0.0 - 0.7 10e9/L    Absolute Basophils 0.0 0.0 - 0.2 10e9/L    Abs Immature Granulocytes 0.0 0 - 0.4 10e9/L    Absolute Nucleated RBC 0.0    Basic metabolic panel   Result Value Ref Range    Sodium 141 133 - 144 mmol/L    Potassium 3.6 3.4 - 5.3 mmol/L    Chloride 106 94 - 109 mmol/L    Carbon Dioxide 30 20 - 32 mmol/L    Anion Gap 5 3 - 14 mmol/L    Glucose 97 70 - 99 mg/dL    Urea Nitrogen 12 7 - 30 mg/dL    Creatinine 0.78 0.52 - 1.04 mg/dL    GFR Estimate 73 >60 mL/min/1.7m2    GFR Estimate If Black 88 >60 mL/min/1.7m2    Calcium 7.5 (L) 8.5 - 10.1 mg/dL   Care Transition RN/SW IP Consult    Narrative    Candice Tovar     10/31/2018 11:14 AM  CARE TRANSITION SOCIAL WORK INITIAL ASSESSMENT:  Reason For Consult: discharge planning   Met with: Patient.    DATA  Active Problems:    Syncope    Hematoma       Primary Care Clinic Name: [facility]  Primary Care MD Name: Dr. Jung Pacheco  Contact information and PCP information verified: Yes      ASSESSMENT  Cognitive Status: awake, alert and oriented.             Lives With: other (see comments) (2 renters)  Living Arrangements: house  Quality Of Family Relationships: supportive  Description of Support System: Supportive       Support Assessment: Lacks necessary supervision and assistance,   Adequate social supports   Insurance Concerns: No Insurance issues identified        This writer met with pt and introduced self and role. Discussed   discharge planning. Patient currently lives alone in a house with   2 individuals renting her extra rooms. She states that her   daughter lives about 10 miles away and doesn't help her out much.   She currently does not receive any services. Patient works for   [employer] currently and hopes that the injury to her   leg won't effect her  "future work.     PLAN    CTS to follow    Candice Tovar  Social Work [facility] [phone]  [facility] [phone]         ABO/Rh type and screen   Result Value Ref Range    Units Ordered 2     ABO A     RH(D) Pos     Antibody Screen Neg     Test Valid Only At [facility]        Specimen Expires 11/03/2018     Crossmatch Red Blood Cells    Blood component   Result Value Ref Range    Unit Number [id number]     Blood Component Type Red Blood Cells Leukocyte Reduced     Division Number 00     Status of Unit Ready for patient 10/31/2018 1025     Blood Product Code [id number]     Unit Status TENA    Blood component   Result Value Ref Range    Unit Number [id number]     Blood Component Type Red Blood Cells Leukocyte Reduced     Division Number 00     Status of Unit Released to care unit 10/31/2018 1049     Blood Product Code [id number]     Unit Status ISS    Methicillin Resist/Sens S. aureus PCR   Result Value Ref Range    Specimen Description Nares     Methicillin Resist/Sens S. aureus PCR Negative NEG^Negative       ROS:  Constitutional - Denies fevers, weight loss, malaise, lethargy  Neuro - Denies tremors or seizures  Pulmon - Denies SOB, dyspnea, hemoptysis, chronic cough or use of an inhaler  CV - Denies CP, SOB, lower extremity edema, difficulty w/ stairs  GI - Denies hematemesis, BRBPR, melena, chronic diarrhea or epigastric pain   - Denies hematuria, difficulty voiding, h/o STDs  Hematology - Denies blood clotting disorders, chronic anemias  Dermatology - No melanomas or skin cancers  Rheumatology - No h/o RA  Pysch - Denies depression, bipolar d/o or schizophrenia    /56  Pulse 69  Temp 98.4  F (36.9  C)  Resp 16  Ht 1.727 m (5' 8\")  Wt 73.8 kg (162 lb 11.2 oz)  SpO2 97%  BMI 24.74 kg/m2    Exam:  General - Alert and Oriented X4, NAD, well nourished  HEENT - Normocephalic, atraumatic  Neck - supple, no LAD  Lungs - respirations unlabored, chest wall excursion " normal  CV - Heart RRR, pulses are strong at the left femoral and dorsalis pedis on the left  Abdomen - Soft, non-tender, +BS  Groins - 2+ pulses bilaterally and no LAD  Rectal - deferred  Neuro - Full ROM, there is a very large hematoma in the subcutaneous tissues occupying the upper half of the lateral aspect of her left thigh. There are marks on the skin outlining the size of the hematoma.  Skin is viable but quite tense and ecchymotic, distal pulses palpable and there is good range of motion  Extremities - No cyanosis, clubbing or edema.      Assessment and Plan: Large subcutaneous hematoma due to trauma. My guess is that the bleeding has stopped. There is no large vessels in that territory, and I think what happened is that she sheared the skin and subcutaneous tissues off of the underlying fascia. Blood and fluid filled up the newly created space and eventually tamponade the bleeding. It has taken 24 hours for the hemoglobin and body fluid status to adjust. I suspected a good share of her hemoglobin loss is related to hydration and fluid shifting. I don't suspect ongoing bleeding at this time.    Also, I think it would be a bad idea to evacuate this hematoma. The hematoma itself has tamponaded the bleeding and we would be releasing the tampon on by opening this up. The only reason to do this would be if the skin became compromised and we had to open this up to preserve skin. So far, that is not necessary.    If there is suspicion of ongoing bleeding the best treatment would be to transfer her to a facility that can do angiography and selective embolization. This would also negate the need for releasing the tamponade.    I will ask one of my colleagues to see her in my absence tomorrow, and I will see her again on Friday. I would repeat the CT scan if one were concerned about ongoing bleeding, and this time I would use contrast as that can be helpful to assess ongoing bleeding.        Marcus Roy MD  FACS

## 2018-10-31 NOTE — PROGRESS NOTES
"[facility] ADMISSION NOTE    Patient admitted to room 1002 at approximately 2039 via cart from emergency room. Patient was accompanied by transport tech and daughter.     Verbal SBAR report received from Marifer ED RN prior to patient arrival.     Patient trasferred to bed via self, scooted over. Patient alert and oriented X 3. Pain is not well controlled.  Medication(s) being used: narcotic analgesics including oxycodone. 0-10 Pain Scale: 10. Admission vital signs: Blood pressure 129/67, pulse 58, temperature 98.3  F (36.8  C), temperature source Oral, resp. rate 18, height 1.727 m (5' 8\"), weight 70.3 kg (155 lb), SpO2 97 %. Patient was oriented to plan of care, call light, bed controls, tv, telephone, bathroom and visiting hours.     Risk Assessment    The following safety risks were identified during admission: fall. Yellow risk band applied: YES.     Skin Initial Assessment    This writer admitted this patient and completed a full skin assessment and Guillermo score in the Adult PCS flowsheet. Appropriate interventions initiated as needed.     Pt refused secondary. Large hematoma on lower left hip (borders marked upon admission to the floor), abrasion on left knee, bruise on left shin.     Skin  Inspection of bony prominences: Full  Skin WDL:  WDL except, characteristics  Skin Temperature: warm  Skin Moisture: dry  Skin Elasticity: quick return to original state  Skin Integrity: bruise(s) (large left hematoma)    Guillermo Risk Assessment  Sensory Perception: 4-->no impairment  Moisture: 4-->rarely moist  Activity: 3-->walks occasionally  Mobility: 3-->slightly limited  Nutrition: 3-->adequate  Friction and Shear: 3-->no apparent problem  Guillermo Score: 20    Roxann Bear    "

## 2018-10-31 NOTE — PROGRESS NOTES
"[facility] Progress Note     Date of Service (when I saw the patient): 10/31/2018    REASON FOR ADMISSION / INTERVAL HISTORY:  Fall with L thigh hematoma/ pain. Still has lot of pain. Feels lightheaded/weak. See details below.    CT PELVIS-IMPRESSION:  1. No fracture is demonstrated.  2. Large hematoma in the subcutaneous tissues lateral to the proximal  left femur measuring approximately 14 cm in greatest dimension.       ASSESSMENT/PLAN:   MECHANICAL FALL WITH LARGE L THIGH HEMATOMA  CT as above. Hg dropped nearly 4gms since last evening--due to bleeding in the hematoma.   Transfused  blood 2 units this AM-hg up 2 gm and is 9.9 now. Surgery seen -no intervention.   -will follow serial hg, repeat CT thigh if hg dropping or BP persistently low.    ACUTE BLOOD LOSS ANEMIA  Due to bleeding in hematoma.pt hypotensive and symptomatic.   -plan as above    HYPOTENSION  Due to acute blood loss. S/p 2 units blood tx. BP in 90s to low 100s  -plan as above    SYNCOPY  Likely due to acute blood loss/ hypotension  -continue fluids. Transfuse if hemodynamically unstable or hg drops.     HYPO T4  Continue meds    DISPO  Will be in hospital 1-2 days.    JYOTI SPENCER MD   Pg [phone]    DVT Prhylaxis: Low Risk/Ambulatory with no VTE prophylaxis indicated  Code Status: Full Code    ROS:  As described in A/P and Exam.  Otherwise ALL are  negative.    PHYSICAL EXAM:  All vitals have been reviewed    Blood pressure 97/60, pulse 74, temperature 99.3  F (37.4  C), temperature source Oral, resp. rate 16, height 1.727 m (5' 8\"), weight 73.8 kg (162 lb 11.2 oz), SpO2 98 %.    I/O this shift:  In: 1637.08 [P.O.:360; I.V.:677.08]  Out: 600 [Urine:600]    GENERAL APPEARANCE: healthy, alert and no distress  EYES: conjunctiva clear, eyes grossly normal  HENT: external ears and nose normal   NECK: supple, no masses or adenopathy  RESP: lungs clear to auscultation - no rales, rhonchi or wheezes  CV: regular rate and " rhythm, normal S1 S2, no S3 or S4 and no murmur, click or rub   ABDOMEN: soft, nontender, no HSM or masses and bowel sounds normal  MS: no clubbing, cyanosis; no edema--large bulging hematoma L lat thigh-also extending to upper/ back thigh  SKIN: clear without significant rashes or lesions  NEURO: -non-focal moves all 4 extr    ROUTINE  LABS (Last four results)  CMP  Recent Labs  Lab 10/31/18  0543 10/30/18  1613    138   POTASSIUM 3.6 4.0   CHLORIDE 106 106   CO2 30 24   ANIONGAP 5 8   GLC 97 184*   BUN 12 15   CR 0.78 0.69   GFRESTIMATED 73 83   GFRESTBLACK 88 >90   CHANNING 7.5* 8.0*     CBC  Recent Labs  Lab 10/31/18  1440 10/31/18  0543 10/30/18  1613   WBC  --  7.4 10.9   RBC  --  2.83* 4.00   HGB 9.9* 7.8* 11.1*   HCT  --  24.2* 34.0*   MCV  --  86 85   MCH  --  27.6 27.8   MCHC  --  32.2 32.6   RDW  --  13.6 13.5   PLT  --  211 271     INRNo lab results found in last 7 days.  Arterial Blood GasNo lab results found in last 7 days.    Recent Results (from the past 24 hour(s))   CT Pelvis Bone wo Contrast    Narrative    CT PELVIS WITHOUT CONTRAST   10/30/2018 5:07 PM    HISTORY: Patient fell and injured left hip. There is a large hematoma  noted on left lateral thigh, measuring approximately 6 cm x 10 cm.  Evaluate for bleeding and fracture of left femur/pelvis.     COMPARISON: Radiographs earlier today.    TECHNIQUE: Routine CT imaging is performed through the pelvis without  contrast. Radiation dose for this scan was reduced using automated  exposure control, adjustment of the mA and/or kV according to patient  size, or iterative reconstruction technique.     FINDINGS: No fracture or acute osseous abnormality is demonstrated.  There are degenerative changes in both hips and the visualized lower  lumbar spine. There is a large soft tissue mass in the subcutaneous  tissues lateral to the proximal left femur. This measures  approximately 14 cm craniocaudal x 11 cm AP x 6.5 cm transverse and is  consistent with  a hematoma. There is edema in the adjacent  subcutaneous tissues. There is calcification in the vascular  structures. No other soft tissue abnormality is identified.      Impression    IMPRESSION:  1. No fracture is demonstrated.  2. Large hematoma in the subcutaneous tissues lateral to the proximal  left femur measuring approximately 14 cm in greatest dimension.    IVETTE CROCKER MD

## 2018-10-31 NOTE — CONSULTS
CARE TRANSITION SOCIAL WORK INITIAL ASSESSMENT:  Reason For Consult: discharge planning   Met with: Patient.    DATA  Active Problems:    Syncope    Hematoma       Primary Care Clinic Name: [facility]  Primary Care MD Name: Dr. Jung Pacheco  Contact information and PCP information verified: Yes      ASSESSMENT  Cognitive Status: awake, alert and oriented.             Lives With: other (see comments) (2 renters)  Living Arrangements: house  Quality Of Family Relationships: supportive  Description of Support System: Supportive       Support Assessment: Lacks necessary supervision and assistance, Adequate social supports   Insurance Concerns: No Insurance issues identified        This writer met with pt and introduced self and role. Discussed discharge planning. Patient currently lives alone in a house with 2 individuals renting her extra rooms. She states that her daughter lives about 10 miles away and doesn't help her out much. She currently does not receive any services. Patient works for [employer] currently and hopes that the injury to her leg won't effect her future work. Surgery consulted, pt may transfer, CTS to follow prn.     PLAN    CTS to follow    Candice Tovar  Social Work [facility] [phone]  [facility] [phone]

## 2018-10-31 NOTE — PROGRESS NOTES
PT- Pt on hold from PT today due to low hemogoblin, symptomatic and getting transfusion, will try again tomorrow      Please Contact me with any questions or concerns. Thank you for for patience and cooperation.     Jeffrey Hare PT, DPT  Flexible Workforce Physical Therapist   [facility]  [email or web address]

## 2018-10-31 NOTE — ED NOTES
"..Patient has  Lamy to Observation  order. Patient has been given the Observation brochure -  What does Observation mean to me.\"  Patient has been given the opportunity to ask questions about observation status and their plan of care.      Yobany Schafer  "

## 2018-10-31 NOTE — PLAN OF CARE
Problem: Patient Care Overview  Goal: Plan of Care/Patient Progress Review  Outcome: Improving  Patient feeling much better after 2 units PRBC's.    Problem: Pain, Acute (Adult)  Goal: Identify Related Risk Factors and Signs and Symptoms  Related risk factors and signs and symptoms are identified upon initiation of Human Response Clinical Practice Guideline (CPG).   Outcome: Improving  Patient's pain well controlled with analgesics (tylenol) Patient's hematoma is reducing in size well within marked borders.

## 2018-10-31 NOTE — PROGRESS NOTES
Attempted to get up to chair this am and pt became dizzy, lightheaded, diaphoretic, nauseated with retching, blood pressure 60/49 HR 61 while up in chair, transferred back to bed and blood pressure increased to 105/63, HR 62, currently 94/49. Dr. Hoskins notified, ordered 2 units packed red blood cells. IVF at 125 ml/hr.   Left upper leg hematoma has not increased around area marked, center of bruise is warm, firm and painful. CMS intact. Pp +. Surgery consult placed by Dr. Hoskins.   Pt also complains of frontal HEADACHE, denies hitting head but states she feel she had whiplash with fall, neuros intact.   Small abrasion on left knee, and 2 fingers on right hand.

## 2018-10-31 NOTE — PROGRESS NOTES
Discharge Planner   Discharge Plans in progress: undetermined  Barriers to discharge plan: Medical stability   Follow up plan: CTS to follow       Entered by: Candice Tovar 10/31/2018 11:12 AM

## 2018-11-01 VITALS
HEART RATE: 77 BPM | BODY MASS INDEX: 24.66 KG/M2 | TEMPERATURE: 98.8 F | SYSTOLIC BLOOD PRESSURE: 119 MMHG | OXYGEN SATURATION: 95 % | RESPIRATION RATE: 16 BRPM | HEIGHT: 68 IN | DIASTOLIC BLOOD PRESSURE: 59 MMHG | WEIGHT: 162.7 LBS

## 2018-11-01 LAB
HGB BLD-MCNC: 9 G/DL (ref 11.7–15.7)
HGB BLD-MCNC: 9.5 G/DL (ref 11.7–15.7)

## 2018-11-01 PROCEDURE — 36415 COLL VENOUS BLD VENIPUNCTURE: CPT | Performed by: INTERNAL MEDICINE

## 2018-11-01 PROCEDURE — 99231 SBSQ HOSP IP/OBS SF/LOW 25: CPT | Performed by: SURGERY

## 2018-11-01 PROCEDURE — 85018 HEMOGLOBIN: CPT | Performed by: INTERNAL MEDICINE

## 2018-11-01 PROCEDURE — 99239 HOSP IP/OBS DSCHRG MGMT >30: CPT | Performed by: INTERNAL MEDICINE

## 2018-11-01 ASSESSMENT — ACTIVITIES OF DAILY LIVING (ADL)
ADLS_ACUITY_SCORE: 11

## 2018-11-01 NOTE — DISCHARGE INSTRUCTIONS
You are scheduled to follow up with your primary care provider, Dr. Jung Pacheco at 9:00am on Thursday, November 15th at the [facility]. [facility] [address] [facility] in [facility]. Please call if you have questions or need to reschedule at [phone]              Lower Extremity Contusion  You have a contusion (bruise) of a lower extremity (leg, knee, ankle, foot, or toe). Symptoms include pain, swelling, and skin discoloration. No bones are broken. This injury may take from a few days to a few weeks to heal.  During that time, the bruise may change from reddish in color, to purple-blue, to green-yellow, to yellow-brown.  Home care    Unless another medicine was prescribed, you can take acetaminophen, ibuprofen, or naproxen to control pain. (If you have chronic liver or kidney disease or ever had a stomach ulcer or gastrointestinal bleeding, talk with your doctor before using these medicines.)    Elevate the injured area to reduce pain and swelling. As much as possible, sit or lie down with the injured area raised about the level of your heart. This is especially important during the first 48 hours.    Ice the injured area to help reduce pain and swelling. Wrap a cold source (ice pack or ice cubes in a plastic bag) in a thin towel. Apply to the bruised area for 20 minutes every 1 to 2 hours the first day. Continue this 3 to 4 times a day until the pain and swelling goes away.    If crutches have been advised, do not bear full weight on the injured leg until you can do so without pain. You may return to sports when you are able to put full weight and impact on the injured leg without pain.  Follow up  Follow up with your healthcare provider or our staff as advised. Call if you are not improving within the next 1 to 2 weeks.  When to seek medical advice   Call your healthcare provider right away if any of these occur:    Increased pain or swelling    Foot or toes become cold, blue,  numb or tingly    Signs of infection: Warmth, drainage, or increased redness or pain around the injury    Inability to move the injured area     Frequent bruising for unknown reasons  Date Last Reviewed: 2/1/2017 2000-2017 [employer]. [address] [place] [address]. All rights reserved. This information is not intended as a substitute for professional medical care. Always follow your healthcare professional's instructions.

## 2018-11-01 NOTE — PROGRESS NOTES
Per nursing- patient is discharging and ride is here this AM. Pt does not need to be seen this morning due to discharge      Please Contact me with any questions or concerns. Thank you for for patience and cooperation.     Jeffrey Hare PT, DPT  Flexible Workforce Physical Therapist   [facility]  [email or web address]

## 2018-11-01 NOTE — PLAN OF CARE
Problem: Pain, Acute (Adult)  Goal: Identify Related Risk Factors and Signs and Symptoms  Related risk factors and signs and symptoms are identified upon initiation of Human Response Clinical Practice Guideline (CPG).   Pt declines pain medications, up ad chuckie indep and states no symptoms of dizziness. VSS.

## 2018-11-01 NOTE — PROGRESS NOTES
Patient discharged to home at 0940 accompanied by family member.PATRICIA ROSS upon discharge.Reviewed all discharge information with patient ,she verbalizes a clear understanding of reviewed info. Patient offers no further questions.

## 2018-11-01 NOTE — DISCHARGE SUMMARY
[facility] Hospitalist Discharge Summary    Jeanna Smith MRN# [id number]   Age: 73 year old YOB: 1945     Date of Admission:  10/30/2018  Date of Discharge::  11/1/2018  Admitting Physician:  Rigoberto Hoskins MD  Discharge Physician:  Rigoberto Hoskins MD  Primary Physician: Jung Pacheco  Transferring Facility: N/A     Home clinic: unknown          Admission Diagnoses:   Traumatic hematoma of left lower leg, initial encounter [S80.12XA]  Syncope, unspecified syncope type [R55]          Discharge Diagnosis:   Principle diagnosis: MECHANICAL FALL WITH LARGE L THIGH TRAUMATIC HEMATOMA  Secondary diagnoses:  Patient Active Problem List   Diagnosis     Contact dermatitis of eyelid     UTI (urinary tract infection)     Hypothyroidism     CARDIOVASCULAR SCREENING; LDL GOAL LESS THAN 160     Syncope     Hematoma          Brief History of Presenting Illness:   As per admit hx  The patient was in her usual state of good health when she was up on an old flatbed trailer and fell through the floor board.  She struck her left lateral hip on a steel beam.  She had immediate pain, was able to walk, but with significant difficulty.  Tried to tolerate it at home, but was in severe pain, and ended up coming into the ED.  The pain was steady, aching, throbbing, severe, worse with activity, better at rest.          No results found for this or any previous visit (from the past 24 hour(s)).     CT PELVIS-IMPRESSION:  1. No fracture is demonstrated.  2. Large hematoma in the subcutaneous tissues lateral to the proximal  left femur measuring approximately 14 cm in greatest dimension.         Hospital Course:   MECHANICAL FALL WITH LARGE L THIGH HEMATOMA  CT as above. Hg dropped nearly 4gms since in a day--due to bleeding in the hematoma.   Transfused  blood 2 units 10/31 AM-hg up 2 gm and was 9.9 now. Surgery seen -no intervention.   Remained stable overnight. Hg 9.0. Hemodynamically stable.     ACUTE BLOOD LOSS ANEMIA  Due to  bleeding in hematoma.pt hypotensive and symptomatic.   Stable.      HYPOTENSION  Due to acute blood loss. S/p 2 units blood tx. BP in 90s to low 100s  Resolved.    SYNCOPY  Likely due to acute blood loss/ hypotension  Stable overnight.     HYPO T4  Continue meds          Procedures:   No procedures performed during this admission         Allergies:      Allergies   Allergen Reactions     Darvocet [Acetaminophen]      Erythromycin Nausea     Penicillins              Medications Prior to Admission:     Prescriptions Prior to Admission   Medication Sig Dispense Refill Last Dose     ARMOUR THYROID 60 MG OR TABS 1 TABLET DAILY   10/30/2018 at am     Calcium Citrate-Vitamin D (CALCIUM + D PO) Take 1 tablet by mouth every evening   10/29/2018 at pm     IRON PO Take 1 tablet by mouth every evening   10/29/2018 at pm     MULTIVITAMIN TABS   OR 1 TABLET DAILY   10/30/2018 at am     Omega-3 Fatty Acids (OMEGA-3 PO) Take 1 capsule by mouth daily   10/30/2018 at am     PANTOPRAZOLE SODIUM PO Take 40 mg by mouth every morning (before breakfast)    10/30/2018 at hs     vitamin B-Complex Take 1 tablet by mouth daily   Past Week at Unknown time             Discharge Medications:     Current Discharge Medication List      CONTINUE these medications which have NOT CHANGED    Details   ARMOUR THYROID 60 MG OR TABS 1 TABLET DAILY      Calcium Citrate-Vitamin D (CALCIUM + D PO) Take 1 tablet by mouth every evening      IRON PO Take 1 tablet by mouth every evening      MULTIVITAMIN TABS   OR 1 TABLET DAILY      Omega-3 Fatty Acids (OMEGA-3 PO) Take 1 capsule by mouth daily      PANTOPRAZOLE SODIUM PO Take 40 mg by mouth every morning (before breakfast)       vitamin B-Complex Take 1 tablet by mouth daily                   Consultations:   Consultation during this admission received from surgery            Discharge Exam:   Blood pressure 119/59, pulse 77, temperature 98.8  F (37.1  C), temperature source Oral, resp. rate 16, height 1.727  "m (5' 8\"), weight 73.8 kg (162 lb 11.2 oz), SpO2 95 %.  GENERAL APPEARANCE: healthy, alert and no distress  EYES: conjunctiva clear, eyes grossly normal  HENT: external ears and nose normal   NECK: supple, no masses or adenopathy  RESP: lungs clear to auscultation - no rales, rhonchi or wheezes  CV: regular rate and rhythm, normal S1 S2, no S3 or S4 and no murmur, click or rub   ABDOMEN: soft, nontender, no HSM or masses and bowel sounds normal  MS: no clubbing, cyanosis; no edema--large hematoma L lat thigh with extension to medial and upper thigh--mildly tense.  SKIN: clear without significant rashes or lesions  NEURO: Normal strength and tone, sensory exam grossly normal, mentation intact and speech normal    Unresulted Labs Ordered in the Past 30 Days of this Admission     No orders found from 8/31/2018 to 10/31/2018.          No results found for this or any previous visit (from the past 24 hour(s)).         Pending Tests at Discharge:   None         Discharge Instructions and Follow-Up:   Discharge diet: Regular   Discharge activity: Activity as tolerated   Discharge follow-up: Follow up with primary care provider in 1-2 weeks           Discharge Disposition:   Discharged to home      Attestation:  I have reviewed today's vital signs, notes, medications, labs and imaging.    Time Spent on this Encounter   IRigoberto, personally saw the patient today and spent greater than 30 minutes discharging this patient.    Rigoberto Hoskins MD       "

## 2018-11-01 NOTE — PHARMACY - DISCHARGE MEDICATION RECONCILIATION
Discharge medication review for this patient is complete. Pharmacist assisted with medication reconciliation of discharge medications with prior to admission medications.     The following changes were made to the discharge medication list based on pharmacist review:  Added:  none  Discontinued: none  Changed: none      Patient's Discharge Medication List  - medications as listed on After Visit Summary (AVS)     Review of your medicines      CONTINUE these medicines which have NOT CHANGED       Dose / Directions    ARMOUR THYROID 60 MG tablet   Generic drug:  thyroid        1 TABLET DAILY   Refills:  0       CALCIUM + D PO        Dose:  1 tablet   Take 1 tablet by mouth every evening   Refills:  0       IRON PO        Dose:  1 tablet   Take 1 tablet by mouth every evening   Refills:  0       MULTIVITAMIN TABS   OR        1 TABLET DAILY   Refills:  0       OMEGA-3 PO        Dose:  1 capsule   Take 1 capsule by mouth daily   Refills:  0       PANTOPRAZOLE SODIUM PO        Dose:  40 mg   Take 40 mg by mouth every morning (before breakfast)   Refills:  0       vitamin B-Complex        Dose:  1 tablet   Take 1 tablet by mouth daily   Refills:  0           Felipa Mullins PharmD

## 2018-11-01 NOTE — PLAN OF CARE
Problem: Pain, Acute (Adult)  Goal: Identify Related Risk Factors and Signs and Symptoms  Related risk factors and signs and symptoms are identified upon initiation of Human Response Clinical Practice Guideline (CPG).   Pt declines tylenol or ice pack for left thigh hematoma. She states it is painful and feels swollen, some edema outside the marked line but the actual bruising remains inside the markings, pt has been using the call light to get up to the BR voiding in the toilet every 1-2 hours. Pt tolerating oral fluids and last BP's have been stable, she denies any lightheadedness/dizziness and is very stable when up walking. IVF stopped to allow pt some uninterrupted sleep/rest tonight.

## 2018-11-01 NOTE — PROGRESS NOTES
Subjective:   Patient seen and examined at bedside.  No acute issues overnight.  Tolerating diet.  No further episodes of presyncope or orthostatic hypotension.  Pain well controlled.  Ambulating without difficulty or assist.      I/O last 3 completed shifts:  In: 2777.08 [P.O.:1000; I.V.:1177.08]  Out: 600 [Urine:600]     No current outpatient prescriptions on file.         ROUTINE IP LABS (Last four results)  BMP  Recent Labs  Lab 10/31/18  0543 10/30/18  1613    138   POTASSIUM 3.6 4.0   CHLORIDE 106 106   CHANNING 7.5* 8.0*   CO2 30 24   BUN 12 15   CR 0.78 0.69   GLC 97 184*     CBC  Recent Labs  Lab 11/01/18  0456  10/31/18  0543 10/30/18  1613   WBC  --   --  7.4 10.9   RBC  --   --  2.83* 4.00   HGB 9.0*  < > 7.8* 11.1*   HCT  --   --  24.2* 34.0*   MCV  --   --  86 85   MCH  --   --  27.6 27.8   MCHC  --   --  32.2 32.6   RDW  --   --  13.6 13.5   PLT  --   --  211 271   < > = values in this interval not displayed.  INRNo lab results found in last 7 days.         Tcurrent: 98.8   B/P: 119/59  P: 77  R: 16  SpO2:95% RA        EXAM  AOX4 NAD  CTAB  RRR  S&NTND +BS  Edema and ecchymosis of left lateral thigh.  Skin capillary refill <2 seconds.  Area non-tender.      A/P:   1. S/P Fall, Left thigh hematoma  - Hemoglobin stable after 2 U PRBCs  - Skin appears viable overlying hematoma  - Stable for discharge from surgery standpoint  - Discussed expected course of hematoma with patient  -  Patient to return to ED if any light-headedness, dizziness or skin concerns for leg.  Discussed it will absorb over time.  Patient comfortable with discharge at this time.    Mayo Malcolm,  on 11/1/2018 at 10:04 AM

## 2019-10-23 ENCOUNTER — HOSPITAL ENCOUNTER (OUTPATIENT)
Dept: MAMMOGRAPHY | Facility: CLINIC | Age: 74
Discharge: HOME | End: 2019-10-23
Attending: OBSTETRICS & GYNECOLOGY

## 2019-10-23 DIAGNOSIS — Z12.31 SCREENING MAMMOGRAM, ENCOUNTER FOR: ICD-10-CM

## 2019-11-26 ENCOUNTER — OFFICE VISIT (OUTPATIENT)
Dept: UROLOGY | Facility: CLINIC | Age: 74
End: 2019-11-26
Payer: COMMERCIAL

## 2019-11-26 VITALS — DIASTOLIC BLOOD PRESSURE: 80 MMHG | HEART RATE: 78 BPM | RESPIRATION RATE: 16 BRPM | SYSTOLIC BLOOD PRESSURE: 121 MMHG

## 2019-11-26 DIAGNOSIS — N30.00 ACUTE CYSTITIS WITHOUT HEMATURIA: Primary | ICD-10-CM

## 2019-11-26 DIAGNOSIS — N39.0 URINARY TRACT INFECTION: ICD-10-CM

## 2019-11-26 LAB
ALBUMIN UR-MCNC: NEGATIVE MG/DL
APPEARANCE UR: ABNORMAL
BACTERIA #/AREA URNS HPF: ABNORMAL /HPF
BILIRUB UR QL STRIP: NEGATIVE
COLOR UR AUTO: YELLOW
GLUCOSE UR STRIP-MCNC: NEGATIVE MG/DL
HGB UR QL STRIP: NEGATIVE
KETONES UR STRIP-MCNC: NEGATIVE MG/DL
LEUKOCYTE ESTERASE UR QL STRIP: ABNORMAL
NITRATE UR QL: NEGATIVE
NON-SQ EPI CELLS #/AREA URNS LPF: ABNORMAL /LPF
PH UR STRIP: 6 PH (ref 5–7)
RBC #/AREA URNS AUTO: ABNORMAL /HPF
SOURCE: ABNORMAL
SP GR UR STRIP: 1.01 (ref 1–1.03)
UROBILINOGEN UR STRIP-ACNC: 0.2 EU/DL (ref 0.2–1)
WBC #/AREA URNS AUTO: ABNORMAL /HPF

## 2019-11-26 PROCEDURE — 81001 URINALYSIS AUTO W/SCOPE: CPT | Performed by: UROLOGY

## 2019-11-26 PROCEDURE — 87086 URINE CULTURE/COLONY COUNT: CPT | Performed by: UROLOGY

## 2019-11-26 PROCEDURE — 87088 URINE BACTERIA CULTURE: CPT | Performed by: UROLOGY

## 2019-11-26 PROCEDURE — 87186 SC STD MICRODIL/AGAR DIL: CPT | Performed by: UROLOGY

## 2019-11-26 PROCEDURE — 99205 OFFICE O/P NEW HI 60 MIN: CPT | Mod: 25 | Performed by: UROLOGY

## 2019-11-26 PROCEDURE — 52000 CYSTOURETHROSCOPY: CPT | Performed by: UROLOGY

## 2019-11-26 NOTE — PROGRESS NOTES
"Jeanna Smith is a 74 year old female seen in consultation for UTI. Consult from Jung Pacheco.      Pt reports 10 yr hx \"recurrent UTI.\" Feels as though she may have one today. Solitary symptom is \"odor;\" denies any other sx's related to her \"UTI's.\"    Second concern is that she noted \"blood on my pad\" for the first time today.    Is \"certain\" that she has UTI today due to the \"really really bad odor.\"    Presently denies dysuria, gross hematuria, frequency; nocturia x 3-4. Denies significant urinary incontinence or need for pad.    Wipes properly. Uses shower. Abstinent.    Seen by Dr Sierra () in [place] >> bladder therapist >> recommended \"orgasm every week;\" no other  intervention.    Pt underwent \"bladder tie-up\" in conjunction with oopherectomy about 10-20 years ago; no other bladder surgery. Has done Kegel's in the past.     Hx 2 vag deliveries, hyster.    Not on HRT. Was dx'd with breast nodule 11 yrs ago, advised to undergo radiation therapy but declined; yearly mammograms apparently stable.     Underwent gastric bypass by Dr Tang about 10 years ago; coincides with the onset of her \"UTI's.\"    Moderate fluids and caffeine. (Did not complete voiding diagram).    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       Past Medical History:   Diagnosis Date     Hypothyroidism        Past Surgical History:   Procedure Laterality Date     C ANESTH,EXCIS RETROPHARYNG TUMOR  2005,2006,2008    fatty tumors removed     C OOPH W/RADIC DISSECT FOR DEBULKING  1993     C VAGINAL HYSTERECTOMY  1988-89     GASTRIC BYPASS  2004     RECTOCELE REPAIR  1990,1992     TONSILLECTOMY  1955       Social History     Socioeconomic History     Marital status:      Spouse name: Not on file     Number of children: Not on file     Years of education: Not on file     Highest education level: Not on file   Occupational History     Not on file   Social Needs     Financial resource strain: Not on file     Food insecurity:     Worry: " Not on file     Inability: Not on file     Transportation needs:     Medical: Not on file     Non-medical: Not on file   Tobacco Use     Smoking status: Never Smoker   Substance and Sexual Activity     Alcohol use: No     Drug use: No     Sexual activity: Yes     Partners: Male   Lifestyle     Physical activity:     Days per week: Not on file     Minutes per session: Not on file     Stress: Not on file   Relationships     Social connections:     Talks on phone: Not on file     Gets together: Not on file     Attends Yarsanism service: Not on file     Active member of club or organization: Not on file     Attends meetings of clubs or organizations: Not on file     Relationship status: Not on file     Intimate partner violence:     Fear of current or ex partner: Not on file     Emotionally abused: Not on file     Physically abused: Not on file     Forced sexual activity: Not on file   Other Topics Concern     Parent/sibling w/ CABG, MI or angioplasty before 65F 55M? Not Asked      Service No     Blood Transfusions No     Caffeine Concern Yes     Comment: 1 cup a coffee a day     Occupational Exposure No     Hobby Hazards No     Sleep Concern No     Stress Concern No     Weight Concern No     Special Diet No     Back Care No     Exercise Yes     Comment: little     Bike Helmet No     Seat Belt Yes     Self-Exams Yes   Social History Narrative     Not on file       Current Outpatient Medications   Medication Sig Dispense Refill     ARMOUR THYROID 60 MG OR TABS 1 TABLET DAILY       Calcium Citrate-Vitamin D (CALCIUM + D PO) Take 1 tablet by mouth every evening       IRON PO Take 1 tablet by mouth every evening       MULTIVITAMIN TABS   OR 1 TABLET DAILY       Omega-3 Fatty Acids (OMEGA-3 PO) Take 1 capsule by mouth daily       PANTOPRAZOLE SODIUM PO Take 40 mg by mouth every morning (before breakfast)        vitamin B-Complex Take 1 tablet by mouth daily         Physical Exam:    GENL: NAD.    ABD: Soft,  non-tender, no masses.    EG: Poorly-estrogenized, no masses.    VAGINA: Poorly-estrogenized, no masses.    BN HYPERMOBILITY: None.    CYSTOCELE: Grade 1 (behind urethral stabilization).    APICAL PROLAPSE: None.    RECTOCELE: None.    BIMANUAL: No mass or tenderness.    Cysto:    (Informed consent obtained. Pause for cause performed)   Sterile prep.    17 Fr scope inserted through urethra. Systematic examination w 70 degree lens.   PVR: 25 cc   MUCOSA: Normal without lesion, mild trabeculation   ORIFICES: Normal location and morphology   CAPACITY: 400 cc; no pain with filling   Scope withdrawn without untoward effect.      (Pt tolerated procedure without difficulty).        4/10/13     UC: E coli  ([facility])    12/20/17     UC: negative  ([facility])    5/14/19 UA: mod heme, 8-10 RBC  5/31/19 UA: negative   UC: (not done)  9/17/19 UA: positive   UC: (not done)    Today:  Results for orders placed or performed in visit on 11/26/19   UA reflex to Microscopic and Culture [[id number]]     Status: Abnormal   Result Value Ref Range    Color Urine Yellow     Appearance Urine Cloudy     Glucose Urine Negative NEG^Negative mg/dL    Bilirubin Urine Negative NEG^Negative    Ketones Urine Negative NEG^Negative mg/dL    Specific Gravity Urine 1.015 1.003 - 1.035    Blood Urine Negative NEG^Negative    pH Urine 6.0 5.0 - 7.0 pH    Protein Albumin Urine Negative NEG^Negative mg/dL    Urobilinogen Urine 0.2 0.2 - 1.0 EU/dL    Nitrite Urine Negative NEG^Negative    Leukocyte Esterase Urine Trace (A) NEG^Negative    Source Midstream Urine    Urine Microscopic     Status: Abnormal   Result Value Ref Range    WBC Urine 5-10 (A) OTO5^0 - 5 /HPF    RBC Urine O - 2 OTO2^O - 2 /HPF    Squamous Epithelial /LPF Urine Few FEW^Few /LPF    Bacteria Urine Many (A) NEG^Negative /HPF       IMP:  1. Hx recurrent UTI by report  2. Atrophic vaginitis, hx breast cancer (not treated)        PLAN:  1. Discussed situation with patient in detail.  2. Pt  understands frequent disconnect between urinary odor and UTI  3. Also discussed importance of UC; will await UC results prior to rx  4. Consider topical HRT; discussed in detail rationale, mech of action, application, expected results, minimal systemic absorption, potential effect of absorption on breast cancer; pt was recently prescribed Premarin Cream, purchased it but has not yet used it; also discussed the alternative of Osphena as a non-hormonal option to improve urethral health/bacterial resistance; pt elects to proceed with Marcos Cream >> carefully instructed in use  5. Set realistic expectations moving forward  6. RTC only PRN  7. 60 minutes spent with patient, more than 50% in counseling and coordination of care for UTI, which did not include time spent for the procedure.

## 2019-11-26 NOTE — NURSING NOTE
"Initial /80 (BP Location: Left arm, Patient Position: Chair, Cuff Size: Adult Regular)   Pulse 78   Resp 16  Estimated body mass index is 24.74 kg/m  as calculated from the following:    Height as of 10/30/18: 1.727 m (5' 8\").    Weight as of 10/30/18: 73.8 kg (162 lb 11.2 oz). .    Patient is here for a recurrent uti.  christopher cho LPN    "

## 2019-11-28 LAB
BACTERIA SPEC CULT: ABNORMAL
Lab: ABNORMAL
SPECIMEN SOURCE: ABNORMAL

## 2019-12-13 DIAGNOSIS — R82.90 NONSPECIFIC FINDING ON EXAMINATION OF URINE: Primary | ICD-10-CM

## 2019-12-13 DIAGNOSIS — N39.0 URINARY TRACT INFECTION: ICD-10-CM

## 2019-12-13 LAB
ALBUMIN UR-MCNC: NEGATIVE MG/DL
APPEARANCE UR: ABNORMAL
BACTERIA #/AREA URNS HPF: ABNORMAL /HPF
BILIRUB UR QL STRIP: NEGATIVE
COLOR UR AUTO: YELLOW
GLUCOSE UR STRIP-MCNC: NEGATIVE MG/DL
HGB UR QL STRIP: NEGATIVE
KETONES UR STRIP-MCNC: NEGATIVE MG/DL
LEUKOCYTE ESTERASE UR QL STRIP: ABNORMAL
NITRATE UR QL: POSITIVE
NON-SQ EPI CELLS #/AREA URNS LPF: ABNORMAL /LPF
PH UR STRIP: 5.5 PH (ref 5–7)
RBC #/AREA URNS AUTO: ABNORMAL /HPF
SOURCE: ABNORMAL
SP GR UR STRIP: 1.02 (ref 1–1.03)
UROBILINOGEN UR STRIP-ACNC: 0.2 EU/DL (ref 0.2–1)
WBC #/AREA URNS AUTO: ABNORMAL /HPF
WBC CLUMPS #/AREA URNS HPF: PRESENT /HPF

## 2019-12-13 PROCEDURE — 87186 SC STD MICRODIL/AGAR DIL: CPT | Performed by: UROLOGY

## 2019-12-13 PROCEDURE — 81001 URINALYSIS AUTO W/SCOPE: CPT | Performed by: UROLOGY

## 2019-12-13 PROCEDURE — 87086 URINE CULTURE/COLONY COUNT: CPT | Performed by: UROLOGY

## 2019-12-13 PROCEDURE — 87088 URINE BACTERIA CULTURE: CPT | Performed by: UROLOGY

## 2019-12-14 LAB
BACTERIA SPEC CULT: ABNORMAL
BACTERIA SPEC CULT: ABNORMAL
Lab: ABNORMAL
SPECIMEN SOURCE: ABNORMAL

## 2019-12-15 DIAGNOSIS — N30.00 ACUTE CYSTITIS WITHOUT HEMATURIA: Primary | ICD-10-CM

## 2019-12-15 RX ORDER — NITROFURANTOIN 25; 75 MG/1; MG/1
100 CAPSULE ORAL 2 TIMES DAILY
Qty: 10 CAPSULE | Refills: 0 | Status: SHIPPED | OUTPATIENT
Start: 2019-12-15 | End: 2019-12-20

## 2019-12-16 ENCOUNTER — TELEPHONE (OUTPATIENT)
Dept: UROLOGY | Facility: CLINIC | Age: 74
End: 2019-12-16

## 2019-12-16 NOTE — TELEPHONE ENCOUNTER
Reason for Call:  Other results    Detailed comments: Pt wants results of UA, please call    Phone Number Patient can be reached at: Cell number on file:    Telephone Information:   Mobile [phone]       Best Time: today    Can we leave a detailed message on this number? NO    Call taken on 12/16/2019 at 11:56 AM by Ledy Petersen

## 2019-12-16 NOTE — TELEPHONE ENCOUNTER
Left message with spouse to have pt call back for urine results below.   Viridiana CHAPPELL RN BSN PHN  Specialty Clinics        Notes recorded by Pawel Kingston MD on 12/15/2019 at 12:22 PM CST  UC pos for E coli, sens to Macrobid. Script sent to [facility]. Pt notified by voicemail.

## 2019-12-18 ENCOUNTER — TELEPHONE (OUTPATIENT)
Dept: UROLOGY | Facility: CLINIC | Age: 74
End: 2019-12-18

## 2019-12-18 NOTE — TELEPHONE ENCOUNTER
Vanesa Mireles is asking for clarification on medication, Macrobid dosage, please call [phone]

## 2019-12-23 ENCOUNTER — TELEPHONE (OUTPATIENT)
Dept: UROLOGY | Facility: CLINIC | Age: 74
End: 2019-12-23

## 2019-12-23 DIAGNOSIS — N39.0 URINARY TRACT INFECTION: Primary | ICD-10-CM

## 2019-12-23 DIAGNOSIS — N39.0 URINARY TRACT INFECTION: ICD-10-CM

## 2019-12-23 PROCEDURE — 87086 URINE CULTURE/COLONY COUNT: CPT | Performed by: UROLOGY

## 2019-12-23 NOTE — TELEPHONE ENCOUNTER
Reason for Call:  Other UTI    Detailed comments: Pt still having UTI symptoms, done with medication, did not help. What else can she do? Please call    Phone Number Patient can be reached at: Cell number on file:    Telephone Information:   Mobile [phone]       Best Time: today    Can we leave a detailed message on this number? YES    Call taken on 12/23/2019 at 9:55 AM by Ledy Petersen

## 2019-12-24 LAB
BACTERIA SPEC CULT: NO GROWTH
Lab: NORMAL
SPECIMEN SOURCE: NORMAL

## 2019-12-24 NOTE — TELEPHONE ENCOUNTER
Patient called- informed of below and UC pending. Number provided to call CT.   Will call for appointment after knowing date of CT.     Collin DENNIS RN   Specialty Clinics

## 2019-12-24 NOTE — TELEPHONE ENCOUNTER
Pawel Kingston MD  You 2 minutes ago (8:51 AM)      Agree with urine culture. Also needs CT abd/pelvis, with and without contrast, then follow up with me in the Clinic to review the images with the patient    Routing comment        Order placed. Attempted to call and notify pt but her mailbox was full. [phone] mobile.    Called home number [phone]. Left message with male to call clinic.     Pt will need to call [phone] to schedule CT.    Vilma ROSS   Allergy RN

## 2019-12-26 ENCOUNTER — TELEPHONE (OUTPATIENT)
Dept: UROLOGY | Facility: CLINIC | Age: 74
End: 2019-12-26

## 2019-12-26 ENCOUNTER — HOSPITAL ENCOUNTER (OUTPATIENT)
Dept: CT IMAGING | Facility: CLINIC | Age: 74
Discharge: HOME | End: 2019-12-26
Attending: UROLOGY | Admitting: UROLOGY
Payer: COMMERCIAL

## 2019-12-26 DIAGNOSIS — N39.0 URINARY TRACT INFECTION: ICD-10-CM

## 2019-12-26 LAB
CREAT BLD-MCNC: 0.7 MG/DL (ref 0.52–1.04)
GFR SERPL CREATININE-BSD FRML MDRD: 82 ML/MIN/{1.73_M2}

## 2019-12-26 PROCEDURE — 82565 ASSAY OF CREATININE: CPT

## 2019-12-26 PROCEDURE — 25000125 ZZHC RX 250: Performed by: UROLOGY

## 2019-12-26 PROCEDURE — 25000128 H RX IP 250 OP 636: Performed by: UROLOGY

## 2019-12-26 PROCEDURE — 74178 CT ABD&PLV WO CNTR FLWD CNTR: CPT

## 2019-12-26 RX ORDER — IOPAMIDOL 755 MG/ML
100 INJECTION, SOLUTION INTRAVASCULAR ONCE
Status: COMPLETED | OUTPATIENT
Start: 2019-12-26 | End: 2019-12-26

## 2019-12-26 RX ADMIN — SODIUM CHLORIDE 80 ML: 9 INJECTION, SOLUTION INTRAVENOUS at 09:35

## 2019-12-26 RX ADMIN — IOPAMIDOL 100 ML: 755 INJECTION, SOLUTION INTRAVENOUS at 09:35

## 2019-12-26 NOTE — TELEPHONE ENCOUNTER
UC from 12/23/19 and CT from today both benign.    Called pt by phone. She is suffering from URI but otherwise feels OK.    No new  intervention indicated today. Will continue to try to minimize both length and breadth of antibiotic use moving forward.

## 2020-03-19 ENCOUNTER — RECORDS - HEALTHEAST (OUTPATIENT)
Dept: ADMINISTRATIVE | Facility: OTHER | Age: 75
End: 2020-03-19

## 2020-04-14 ENCOUNTER — TELEPHONE (OUTPATIENT)
Dept: UROLOGY | Facility: CLINIC | Age: 75
End: 2020-04-14

## 2020-04-14 DIAGNOSIS — N39.0 URINARY TRACT INFECTION: Primary | ICD-10-CM

## 2020-04-14 NOTE — TELEPHONE ENCOUNTER
Reason for Call:  Other call back    Detailed comments: pt calling stating she would like to get a cultured UA. She has been experiencing an odor the last 2 weeks which usually means she has an infection.    Phone Number Patient can be reached at: Cell number on file:    Telephone Information:   Mobile [phone]       Best Time: any     Can we leave a detailed message on this number? YES    Call taken on 4/14/2020 at 10:11 AM by Savanna Castillo

## 2020-04-20 NOTE — TELEPHONE ENCOUNTER
Able to view notes with new entry:      OK for urine culture    Routing comment       Loulou Carter, Pawel Preciado MD 6 days ago      See pt request for Urine Culture- Sx is odor.  (Not generally an insurance covered sx )    Routing comment        Ordered. Patient called and informed.     Collin DENNIS RN   Specialty Clinics

## 2020-04-20 NOTE — TELEPHONE ENCOUNTER
Pt calling to check on status of this culture UA request. (no notes recorded after 1st request)    Please contact pt when order has been placed:  [phone] (ok to leave message)    Denise Behrendt Specialty CSS

## 2020-04-21 DIAGNOSIS — N39.0 URINARY TRACT INFECTION: Primary | ICD-10-CM

## 2020-04-21 PROCEDURE — 87086 URINE CULTURE/COLONY COUNT: CPT | Performed by: UROLOGY

## 2020-04-21 PROCEDURE — 87186 SC STD MICRODIL/AGAR DIL: CPT | Performed by: UROLOGY

## 2020-04-21 PROCEDURE — 87088 URINE BACTERIA CULTURE: CPT | Performed by: UROLOGY

## 2020-04-22 LAB
BACTERIA SPEC CULT: ABNORMAL
Lab: ABNORMAL
SPECIMEN SOURCE: ABNORMAL

## 2020-04-24 RX ORDER — NITROFURANTOIN 25; 75 MG/1; MG/1
100 CAPSULE ORAL 2 TIMES DAILY
Qty: 10 CAPSULE | Refills: 0 | Status: SHIPPED | OUTPATIENT
Start: 2020-04-24 | End: 2020-04-29

## 2020-06-25 ENCOUNTER — HOSPITAL ENCOUNTER (OUTPATIENT)
Dept: NUCLEAR MEDICINE | Facility: HOSPITAL | Age: 75
Discharge: HOME | End: 2020-06-25
Attending: OBSTETRICS & GYNECOLOGY

## 2020-06-25 DIAGNOSIS — Z85.3 HISTORY OF BREAST CANCER: ICD-10-CM

## 2020-06-25 DIAGNOSIS — R07.81 RIB PAIN ON RIGHT SIDE: ICD-10-CM

## 2020-11-19 ENCOUNTER — HOSPITAL ENCOUNTER (OUTPATIENT)
Dept: MAMMOGRAPHY | Facility: CLINIC | Age: 75
Discharge: HOME | End: 2020-11-19
Attending: OBSTETRICS & GYNECOLOGY

## 2020-11-19 DIAGNOSIS — Z12.31 VISIT FOR SCREENING MAMMOGRAM: ICD-10-CM

## 2020-12-29 ENCOUNTER — HOSPITAL ENCOUNTER (OUTPATIENT)
Dept: MRI IMAGING | Facility: CLINIC | Age: 75
Discharge: HOME | End: 2020-12-29
Attending: ORTHOPAEDIC SURGERY | Admitting: ORTHOPAEDIC SURGERY
Payer: MEDICARE

## 2020-12-29 DIAGNOSIS — M25.511 PAIN IN JOINT OF RIGHT SHOULDER: ICD-10-CM

## 2020-12-29 PROCEDURE — 72141 MRI NECK SPINE W/O DYE: CPT

## 2021-05-25 ENCOUNTER — RECORDS - HEALTHEAST (OUTPATIENT)
Dept: ADMINISTRATIVE | Facility: CLINIC | Age: 76
End: 2021-05-25

## 2021-05-26 ENCOUNTER — RECORDS - HEALTHEAST (OUTPATIENT)
Dept: ADMINISTRATIVE | Facility: CLINIC | Age: 76
End: 2021-05-26

## 2021-05-27 ENCOUNTER — RECORDS - HEALTHEAST (OUTPATIENT)
Dept: ADMINISTRATIVE | Facility: CLINIC | Age: 76
End: 2021-05-27

## 2021-05-29 ENCOUNTER — RECORDS - HEALTHEAST (OUTPATIENT)
Dept: ADMINISTRATIVE | Facility: CLINIC | Age: 76
End: 2021-05-29

## 2021-05-30 ENCOUNTER — RECORDS - HEALTHEAST (OUTPATIENT)
Dept: ADMINISTRATIVE | Facility: CLINIC | Age: 76
End: 2021-05-30

## 2021-05-31 ENCOUNTER — RECORDS - HEALTHEAST (OUTPATIENT)
Dept: ADMINISTRATIVE | Facility: CLINIC | Age: 76
End: 2021-05-31

## 2021-07-13 ENCOUNTER — RECORDS - HEALTHEAST (OUTPATIENT)
Dept: ADMINISTRATIVE | Facility: CLINIC | Age: 76
End: 2021-07-13

## 2021-07-22 ENCOUNTER — RECORDS - HEALTHEAST (OUTPATIENT)
Dept: SCHEDULING | Facility: CLINIC | Age: 76
End: 2021-07-22

## 2021-07-22 DIAGNOSIS — Z12.31 OTHER SCREENING MAMMOGRAM: ICD-10-CM

## 2021-09-14 ENCOUNTER — ANCILLARY PROCEDURE (OUTPATIENT)
Dept: MAMMOGRAPHY | Facility: CLINIC | Age: 76
End: 2021-09-14
Attending: OBSTETRICS & GYNECOLOGY
Payer: MEDICARE

## 2021-09-14 DIAGNOSIS — Z12.31 VISIT FOR SCREENING MAMMOGRAM: ICD-10-CM

## 2021-09-14 PROCEDURE — 77063 BREAST TOMOSYNTHESIS BI: CPT

## 2022-06-13 ENCOUNTER — TELEPHONE (OUTPATIENT)
Dept: UROLOGY | Facility: CLINIC | Age: 77
End: 2022-06-13
Payer: MEDICARE

## 2022-06-13 NOTE — TELEPHONE ENCOUNTER
"Patient is having UTI symptoms, she only wants Dr Kingston to put in orders to leave a sample, she said it is not necessary to make a appt and it is \"counter productive to come in. \"   "

## 2022-06-13 NOTE — TELEPHONE ENCOUNTER
Called and spoke to patient.   Patient states that she feels she has a urinary tract infection.   Dysuria and odor.   Patient has not been seen in clinic since 2019.  Offered an appointment this week 6/16/2022, pt declined.   Advised patient to reach out to PCP, pt states she does not have one.   Advised patient to be seen in urgent care, pt states she doesn't want to have to wait hours to be seen.   Advised patient I can schedule her this week with Dr. Kingston she declined.   Adriana Michael RN   [phone]

## 2022-09-15 ENCOUNTER — ANCILLARY PROCEDURE (OUTPATIENT)
Dept: MAMMOGRAPHY | Facility: CLINIC | Age: 77
End: 2022-09-15
Attending: INTERNAL MEDICINE
Payer: MEDICARE

## 2022-09-15 DIAGNOSIS — Z12.31 VISIT FOR SCREENING MAMMOGRAM: ICD-10-CM

## 2022-09-15 PROCEDURE — 77067 SCR MAMMO BI INCL CAD: CPT

## 2023-09-12 ENCOUNTER — ANCILLARY PROCEDURE (OUTPATIENT)
Dept: MAMMOGRAPHY | Facility: CLINIC | Age: 78
End: 2023-09-12
Attending: OBSTETRICS & GYNECOLOGY
Payer: MEDICARE

## 2023-09-12 DIAGNOSIS — Z12.31 VISIT FOR SCREENING MAMMOGRAM: ICD-10-CM

## 2023-09-12 PROCEDURE — 77067 SCR MAMMO BI INCL CAD: CPT

## 2024-07-24 ENCOUNTER — LAB REQUISITION (OUTPATIENT)
Dept: LAB | Facility: CLINIC | Age: 79
End: 2024-07-24
Payer: MEDICARE

## 2024-07-24 DIAGNOSIS — R77.8 OTHER SPECIFIED ABNORMALITIES OF PLASMA PROTEINS: ICD-10-CM

## 2024-07-24 DIAGNOSIS — Z13.29 ENCOUNTER FOR SCREENING FOR OTHER SUSPECTED ENDOCRINE DISORDER: ICD-10-CM

## 2024-07-24 PROCEDURE — 84443 ASSAY THYROID STIM HORMONE: CPT | Mod: ORL | Performed by: OBSTETRICS & GYNECOLOGY

## 2024-07-24 PROCEDURE — 82728 ASSAY OF FERRITIN: CPT | Mod: ORL | Performed by: OBSTETRICS & GYNECOLOGY

## 2024-07-25 LAB
FERRITIN SERPL-MCNC: 137 NG/ML (ref 11–328)
TSH SERPL DL<=0.005 MIU/L-ACNC: 1.56 UIU/ML (ref 0.3–4.2)

## 2024-08-15 ENCOUNTER — HOSPITAL ENCOUNTER (OUTPATIENT)
Dept: ULTRASOUND IMAGING | Facility: CLINIC | Age: 79
Discharge: HOME | DRG: 522 | End: 2024-08-15
Attending: OBSTETRICS & GYNECOLOGY
Payer: MEDICARE

## 2024-08-15 ENCOUNTER — HOSPITAL ENCOUNTER (OUTPATIENT)
Dept: MAMMOGRAPHY | Facility: CLINIC | Age: 79
Discharge: HOME | DRG: 522 | End: 2024-08-15
Attending: OBSTETRICS & GYNECOLOGY
Payer: MEDICARE

## 2024-08-15 DIAGNOSIS — N63.21 LUMP IN UPPER OUTER QUADRANT OF LEFT BREAST: ICD-10-CM

## 2024-08-15 PROCEDURE — 77062 BREAST TOMOSYNTHESIS BI: CPT

## 2024-08-15 PROCEDURE — 76642 ULTRASOUND BREAST LIMITED: CPT | Mod: LT

## 2024-08-16 ENCOUNTER — HOSPITAL ENCOUNTER (EMERGENCY)
Facility: CLINIC | Age: 79
Discharge: HOME | DRG: 522 | End: 2024-08-16
Attending: NURSE PRACTITIONER | Admitting: NURSE PRACTITIONER
Payer: MEDICARE

## 2024-08-16 VITALS
DIASTOLIC BLOOD PRESSURE: 70 MMHG | SYSTOLIC BLOOD PRESSURE: 132 MMHG | RESPIRATION RATE: 18 BRPM | TEMPERATURE: 99 F | HEART RATE: 78 BPM | OXYGEN SATURATION: 98 %

## 2024-08-16 DIAGNOSIS — R11.0 NAUSEA: ICD-10-CM

## 2024-08-16 DIAGNOSIS — E87.6 HYPOKALEMIA: ICD-10-CM

## 2024-08-16 DIAGNOSIS — D64.9 ANEMIA, UNSPECIFIED TYPE: ICD-10-CM

## 2024-08-16 DIAGNOSIS — R19.7 DIARRHEA, UNSPECIFIED TYPE: ICD-10-CM

## 2024-08-16 DIAGNOSIS — A49.9 UTI (URINARY TRACT INFECTION), BACTERIAL: ICD-10-CM

## 2024-08-16 DIAGNOSIS — N39.0 UTI (URINARY TRACT INFECTION), BACTERIAL: ICD-10-CM

## 2024-08-16 LAB
ALBUMIN SERPL BCG-MCNC: 3.1 G/DL (ref 3.5–5.2)
ALBUMIN UR-MCNC: 100 MG/DL
ALP SERPL-CCNC: 68 U/L (ref 40–150)
ALT SERPL W P-5'-P-CCNC: 12 U/L (ref 0–50)
ANION GAP SERPL CALCULATED.3IONS-SCNC: 9 MMOL/L (ref 7–15)
APPEARANCE UR: ABNORMAL
AST SERPL W P-5'-P-CCNC: 17 U/L (ref 0–45)
BACTERIA #/AREA URNS HPF: ABNORMAL /HPF
BASOPHILS # BLD AUTO: 0 10E3/UL (ref 0–0.2)
BASOPHILS NFR BLD AUTO: 0 %
BILIRUB SERPL-MCNC: 0.6 MG/DL
BILIRUB UR QL STRIP: NEGATIVE
BUN SERPL-MCNC: 13.6 MG/DL (ref 8–23)
CALCIUM SERPL-MCNC: 7.8 MG/DL (ref 8.8–10.4)
CHLORIDE SERPL-SCNC: 104 MMOL/L (ref 98–107)
COLOR UR AUTO: YELLOW
CREAT SERPL-MCNC: 0.82 MG/DL (ref 0.51–0.95)
EGFRCR SERPLBLD CKD-EPI 2021: 72 ML/MIN/1.73M2
EOSINOPHIL # BLD AUTO: 0 10E3/UL (ref 0–0.7)
EOSINOPHIL NFR BLD AUTO: 0 %
ERYTHROCYTE [DISTWIDTH] IN BLOOD BY AUTOMATED COUNT: 14.3 % (ref 10–15)
FLUAV RNA SPEC QL NAA+PROBE: NEGATIVE
FLUBV RNA RESP QL NAA+PROBE: NEGATIVE
GLUCOSE SERPL-MCNC: 145 MG/DL (ref 70–99)
GLUCOSE UR STRIP-MCNC: NEGATIVE MG/DL
HCO3 SERPL-SCNC: 23 MMOL/L (ref 22–29)
HCT VFR BLD AUTO: 29.9 % (ref 35–47)
HGB BLD-MCNC: 9.8 G/DL (ref 11.7–15.7)
HGB UR QL STRIP: ABNORMAL
HOLD SPECIMEN: NORMAL
IMM GRANULOCYTES # BLD: 0 10E3/UL
IMM GRANULOCYTES NFR BLD: 0 %
KETONES UR STRIP-MCNC: 5 MG/DL
LEUKOCYTE ESTERASE UR QL STRIP: ABNORMAL
LYMPHOCYTES # BLD AUTO: 0.3 10E3/UL (ref 0.8–5.3)
LYMPHOCYTES NFR BLD AUTO: 3 %
MCH RBC QN AUTO: 27.8 PG (ref 26.5–33)
MCHC RBC AUTO-ENTMCNC: 32.8 G/DL (ref 31.5–36.5)
MCV RBC AUTO: 85 FL (ref 78–100)
MONOCYTES # BLD AUTO: 0.5 10E3/UL (ref 0–1.3)
MONOCYTES NFR BLD AUTO: 5 %
MUCOUS THREADS #/AREA URNS LPF: PRESENT /LPF
NEUTROPHILS # BLD AUTO: 8.9 10E3/UL (ref 1.6–8.3)
NEUTROPHILS NFR BLD AUTO: 91 %
NITRATE UR QL: POSITIVE
NRBC # BLD AUTO: 0 10E3/UL
NRBC BLD AUTO-RTO: 0 /100
PH UR STRIP: 5 [PH] (ref 5–7)
PLATELET # BLD AUTO: 205 10E3/UL (ref 150–450)
POTASSIUM SERPL-SCNC: 2.9 MMOL/L (ref 3.4–5.3)
PROT SERPL-MCNC: 5.1 G/DL (ref 6.4–8.3)
RBC # BLD AUTO: 3.53 10E6/UL (ref 3.8–5.2)
RBC URINE: 13 /HPF
RSV RNA SPEC NAA+PROBE: NEGATIVE
SARS-COV-2 RNA RESP QL NAA+PROBE: NEGATIVE
SODIUM SERPL-SCNC: 136 MMOL/L (ref 135–145)
SP GR UR STRIP: 1.01 (ref 1–1.03)
SQUAMOUS EPITHELIAL: 1 /HPF
UROBILINOGEN UR STRIP-MCNC: NORMAL MG/DL
WBC # BLD AUTO: 9.7 10E3/UL (ref 4–11)
WBC CLUMPS #/AREA URNS HPF: PRESENT /HPF
WBC URINE: >182 /HPF

## 2024-08-16 PROCEDURE — 87186 SC STD MICRODIL/AGAR DIL: CPT | Performed by: NURSE PRACTITIONER

## 2024-08-16 PROCEDURE — 81001 URINALYSIS AUTO W/SCOPE: CPT | Performed by: NURSE PRACTITIONER

## 2024-08-16 PROCEDURE — 250N000013 HC RX MED GY IP 250 OP 250 PS 637: Performed by: NURSE PRACTITIONER

## 2024-08-16 PROCEDURE — 99284 EMERGENCY DEPT VISIT MOD MDM: CPT | Mod: 25 | Performed by: NURSE PRACTITIONER

## 2024-08-16 PROCEDURE — 258N000003 HC RX IP 258 OP 636: Performed by: NURSE PRACTITIONER

## 2024-08-16 PROCEDURE — 87086 URINE CULTURE/COLONY COUNT: CPT | Performed by: NURSE PRACTITIONER

## 2024-08-16 PROCEDURE — 87637 SARSCOV2&INF A&B&RSV AMP PRB: CPT | Performed by: NURSE PRACTITIONER

## 2024-08-16 PROCEDURE — 96365 THER/PROPH/DIAG IV INF INIT: CPT | Performed by: NURSE PRACTITIONER

## 2024-08-16 PROCEDURE — 96375 TX/PRO/DX INJ NEW DRUG ADDON: CPT | Performed by: NURSE PRACTITIONER

## 2024-08-16 PROCEDURE — 36415 COLL VENOUS BLD VENIPUNCTURE: CPT | Performed by: NURSE PRACTITIONER

## 2024-08-16 PROCEDURE — 80053 COMPREHEN METABOLIC PANEL: CPT | Performed by: NURSE PRACTITIONER

## 2024-08-16 PROCEDURE — 99284 EMERGENCY DEPT VISIT MOD MDM: CPT | Performed by: NURSE PRACTITIONER

## 2024-08-16 PROCEDURE — 250N000011 HC RX IP 250 OP 636: Performed by: NURSE PRACTITIONER

## 2024-08-16 PROCEDURE — 85025 COMPLETE CBC W/AUTO DIFF WBC: CPT | Performed by: NURSE PRACTITIONER

## 2024-08-16 RX ORDER — POTASSIUM CHLORIDE 7.45 MG/ML
10 INJECTION INTRAVENOUS ONCE
Status: COMPLETED | OUTPATIENT
Start: 2024-08-16 | End: 2024-08-16

## 2024-08-16 RX ORDER — SULFAMETHOXAZOLE/TRIMETHOPRIM 800-160 MG
1 TABLET ORAL ONCE
Status: COMPLETED | OUTPATIENT
Start: 2024-08-16 | End: 2024-08-16

## 2024-08-16 RX ORDER — POTASSIUM CHLORIDE 1500 MG/1
40 TABLET, EXTENDED RELEASE ORAL ONCE
Status: COMPLETED | OUTPATIENT
Start: 2024-08-16 | End: 2024-08-16

## 2024-08-16 RX ORDER — KETOROLAC TROMETHAMINE 15 MG/ML
15 INJECTION, SOLUTION INTRAMUSCULAR; INTRAVENOUS ONCE
Status: COMPLETED | OUTPATIENT
Start: 2024-08-16 | End: 2024-08-16

## 2024-08-16 RX ORDER — SULFAMETHOXAZOLE/TRIMETHOPRIM 800-160 MG
1 TABLET ORAL 2 TIMES DAILY
Qty: 14 TABLET | Refills: 0 | Status: ON HOLD | OUTPATIENT
Start: 2024-08-16 | End: 2024-08-21

## 2024-08-16 RX ORDER — ONDANSETRON 2 MG/ML
4 INJECTION INTRAMUSCULAR; INTRAVENOUS ONCE
Status: COMPLETED | OUTPATIENT
Start: 2024-08-16 | End: 2024-08-16

## 2024-08-16 RX ORDER — POTASSIUM CHLORIDE 1500 MG/1
20 TABLET, EXTENDED RELEASE ORAL 2 TIMES DAILY
Qty: 6 TABLET | Refills: 0 | Status: ON HOLD | OUTPATIENT
Start: 2024-08-16 | End: 2024-08-21

## 2024-08-16 RX ADMIN — KETOROLAC TROMETHAMINE 15 MG: 15 INJECTION, SOLUTION INTRAMUSCULAR; INTRAVENOUS at 19:47

## 2024-08-16 RX ADMIN — SODIUM CHLORIDE 1000 ML: 9 INJECTION, SOLUTION INTRAVENOUS at 19:27

## 2024-08-16 RX ADMIN — SULFAMETHOXAZOLE AND TRIMETHOPRIM 1 TABLET: 800; 160 TABLET ORAL at 20:40

## 2024-08-16 RX ADMIN — ONDANSETRON 4 MG: 2 INJECTION INTRAMUSCULAR; INTRAVENOUS at 19:46

## 2024-08-16 RX ADMIN — POTASSIUM CHLORIDE 40 MEQ: 1500 TABLET, EXTENDED RELEASE ORAL at 20:30

## 2024-08-16 RX ADMIN — POTASSIUM CHLORIDE 10 MEQ: 7.46 INJECTION, SOLUTION INTRAVENOUS at 19:57

## 2024-08-16 ASSESSMENT — COLUMBIA-SUICIDE SEVERITY RATING SCALE - C-SSRS
6. HAVE YOU EVER DONE ANYTHING, STARTED TO DO ANYTHING, OR PREPARED TO DO ANYTHING TO END YOUR LIFE?: NO
1. IN THE PAST MONTH, HAVE YOU WISHED YOU WERE DEAD OR WISHED YOU COULD GO TO SLEEP AND NOT WAKE UP?: NO
2. HAVE YOU ACTUALLY HAD ANY THOUGHTS OF KILLING YOURSELF IN THE PAST MONTH?: NO

## 2024-08-16 ASSESSMENT — ACTIVITIES OF DAILY LIVING (ADL)
ADLS_ACUITY_SCORE: 35
ADLS_ACUITY_SCORE: 35

## 2024-08-16 NOTE — ED PROVIDER NOTES
"  History     Chief Complaint   Patient presents with    Generalized Weakness     Arriving via EMS 1641 for concerns of weakness and dizziness. VSS NSR. From home.      HPI  Jeanna Smith is a 79 year old female with history of BYRON, left breast cancer, GERD, osteopenia, hypothyroidism, s/p gastric bypass (in 2004), migraine headache, anxiety and depression who presents via EMS for evaluation of weakness, headache, diarrhea, and dizziness.  Patient reports she has been doing some strenuous activity this past week as she was \"threshing\", and 2 days ago was baling hay [place].  She has been having some low back pain since then and treating this with ice packs that has been helping.  Yesterday she donated blood. She has noted some urinary frequency over the last 24 hours.  This morning she awoke and had breakfast, sat in her recliner for a while watching TV. This afternoon she started having nausea, diarrhea, and feeling weak. 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 She started feeling worse and having headache so she called 911. She was given IV  ml bolus and 500 mg p.o. Tylenol by EMS.  She does have a history of migraines, but has not had one for many years.  Her current headache does not feel like a migraine.    Colonoscopy and Upper Endoscopy 11/13/2023:  Impression:            - Z-line irregular, 38 cm from the incisors.                          - LA Grade A erosive esophagitis with no bleeding.                          - Small hiatal hernia.                          - Kaylynn-en-Y gastrojejunostomy with gastrojejunal                          anastomosis characterized by healthy appearing                          mucosa.                          - Normal examined jejunum.                          - No specimens collected.     Impression:            - Preparation of the colon was fair.                          - The examined portion of the ileum was normal.    "                       - One 11 mm polyp in the ascending colon, removed                          with a cold snare. Resected and retrieved.                          - One 10 mm polyp in the transverse colon, removed                          with a cold snare. Resected and retrieved.                          - The examination was otherwise normal on direct                          and retroflexion views.   Allergies:  Allergies   Allergen Reactions    Darvocet [Acetaminophen]     Erythromycin Nausea    Pcn [Penicillins]        Problem List:    Patient Active Problem List    Diagnosis Date Noted    Hematoma 10/31/2018     Priority: Medium    Syncope 10/30/2018     Priority: Medium    CARDIOVASCULAR SCREENING; LDL GOAL LESS THAN 160 10/31/2010     Priority: Medium    Contact dermatitis of eyelid 11/07/2009     Priority: Medium    UTI (urinary tract infection) 11/07/2009     Priority: Medium    Hypothyroidism 11/07/2009     Priority: Medium        Past Medical History:    Past Medical History:   Diagnosis Date    Breast cancer (H)     Breast cyst     Hypothyroidism        Past Surgical History:    Past Surgical History:   Procedure Laterality Date    BIOPSY BREAST Left 2008    BREAST CYST ASPIRATION Left 10/2008    GASTRIC BYPASS  2004    HYSTERECTOMY      LUMPECTOMY BREAST Left 2008    OOPHORECTOMY      RECTOCELE REPAIR  1990,1992    TONSILLECTOMY  1955    [facility] ANESTH,EXCIS RETROPHARYNG TUMOR  2005,2006,2008    fatty tumors removed    [facility] OOPH W/RADIC DISSECT FOR DEBULKING  1993    [facility] VAGINAL HYSTERECTOMY  1988-89       Family History:    Family History   Problem Relation Age of Onset    Arthritis Mother     Heart Disease Mother         TIA's    Respiratory Father         emphysema    No Known Problems Sister     No Known Problems Daughter     No Known Problems Maternal Grandmother     No Known Problems Maternal Grandfather     No Known Problems Paternal Grandmother     No Known Problems Paternal Grandfather     No  Known Problems Maternal Aunt     No Known Problems Paternal Aunt     Hereditary Breast and Ovarian Cancer Syndrome No family hx of     Breast Cancer No family hx of     Cancer No family hx of     Colon Cancer No family hx of     Endometrial Cancer No family hx of     Ovarian Cancer No family hx of        Social History:  Marital Status:   [5]  Social History     Substance Use Topics    Alcohol use: No    Drug use: No        Medications:    ARMOUR THYROID 60 MG OR TABS  Calcium Citrate-Vitamin D (CALCIUM + D PO)  IRON PO  MULTIVITAMIN TABS   OR  Omega-3 Fatty Acids (OMEGA-3 PO)  PANTOPRAZOLE SODIUM PO  vitamin B-Complex          Review of Systems  As mentioned above in the history present illness. All other systems were reviewed and are negative.    Physical Exam   BP: (!) 148/60  Pulse: 90  Temp: 99  F (37.2  C)  Resp: 18  SpO2: 97 %      Physical Exam  Constitutional:       General: She is in acute distress.      Appearance: She is well-developed. She is not ill-appearing.      Comments: Light sensitive due to headache.   HENT:      Head: Normocephalic and atraumatic.      Right Ear: External ear normal.      Left Ear: External ear normal.      Nose: Nose normal.      Mouth/Throat:      Mouth: Mucous membranes are moist.   Eyes:      Conjunctiva/sclera: Conjunctivae normal.   Cardiovascular:      Rate and Rhythm: Normal rate and regular rhythm.      Heart sounds: Normal heart sounds. No murmur heard.  Pulmonary:      Effort: Pulmonary effort is normal. No respiratory distress.      Breath sounds: Normal breath sounds.   Abdominal:      General: Bowel sounds are normal. There is no distension.      Palpations: Abdomen is soft.      Tenderness: There is no abdominal tenderness.   Musculoskeletal:         General: Normal range of motion.   Skin:     General: Skin is warm and dry.      Findings: No rash.   Neurological:      General: No focal deficit present.      Mental Status: She is alert and oriented to  person, place, and time.         ED Course     ED Course as of 08/16/24 2034   Fri Aug 16, 2024   1951 Potassium(!): 2.9  Patient will be given IV potassium 10 mEq for replacement. She is getting IV Zofran for nausea and if improves then will give a dose of p.o. potassium.     Procedures              Results for orders placed or performed during the hospital encounter of 08/16/24 (from the past 24 hour(s))   [place] Draw    Narrative    The following orders were created for panel order [place] Draw.  Procedure                               Abnormality         Status                     ---------                               -----------         ------                     Extra Blue Top Tube[[id number]]                              Final result               Extra Red Top Tube[[id number]]                               Final result               Extra Green Top (Lithium...[[id number]]                      Final result               Extra Purple Top Tube[[id number]]                            Final result                 Please view results for these tests on the individual orders.   Extra Blue Top Tube   Result Value Ref Range    Hold Specimen JIC    Extra Red Top Tube   Result Value Ref Range    Hold Specimen JIC    Extra Green Top (Lithium Heparin) Tube   Result Value Ref Range    Hold Specimen JIC    Extra Purple Top Tube   Result Value Ref Range    Hold Specimen JIC    CBC with platelets differential    Narrative    The following orders were created for panel order CBC with platelets differential.  Procedure                               Abnormality         Status                     ---------                               -----------         ------                     CBC with platelets and d...[[id number]]  Abnormal            Final result                 Please view results for these tests on the individual orders.   Comprehensive metabolic panel   Result Value Ref Range    Sodium 136 135 - 145 mmol/L    Potassium  2.9 (L) 3.4 - 5.3 mmol/L    Carbon Dioxide (CO2) 23 22 - 29 mmol/L    Anion Gap 9 7 - 15 mmol/L    Urea Nitrogen 13.6 8.0 - 23.0 mg/dL    Creatinine 0.82 0.51 - 0.95 mg/dL    GFR Estimate 72 >60 mL/min/1.73m2    Calcium 7.8 (L) 8.8 - 10.4 mg/dL    Chloride 104 98 - 107 mmol/L    Glucose 145 (H) 70 - 99 mg/dL    Alkaline Phosphatase 68 40 - 150 U/L    AST 17 0 - 45 U/L    ALT 12 0 - 50 U/L    Protein Total 5.1 (L) 6.4 - 8.3 g/dL    Albumin 3.1 (L) 3.5 - 5.2 g/dL    Bilirubin Total 0.6 <=1.2 mg/dL   CBC with platelets and differential   Result Value Ref Range    WBC Count 9.7 4.0 - 11.0 10e3/uL    RBC Count 3.53 (L) 3.80 - 5.20 10e6/uL    Hemoglobin 9.8 (L) 11.7 - 15.7 g/dL    Hematocrit 29.9 (L) 35.0 - 47.0 %    MCV 85 78 - 100 fL    MCH 27.8 26.5 - 33.0 pg    MCHC 32.8 31.5 - 36.5 g/dL    RDW 14.3 10.0 - 15.0 %    Platelet Count 205 150 - 450 10e3/uL    % Neutrophils 91 %    % Lymphocytes 3 %    % Monocytes 5 %    % Eosinophils 0 %    % Basophils 0 %    % Immature Granulocytes 0 %    NRBCs per 100 WBC 0 <1 /100    Absolute Neutrophils 8.9 (H) 1.6 - 8.3 10e3/uL    Absolute Lymphocytes 0.3 (L) 0.8 - 5.3 10e3/uL    Absolute Monocytes 0.5 0.0 - 1.3 10e3/uL    Absolute Eosinophils 0.0 0.0 - 0.7 10e3/uL    Absolute Basophils 0.0 0.0 - 0.2 10e3/uL    Absolute Immature Granulocytes 0.0 <=0.4 10e3/uL    Absolute NRBCs 0.0 10e3/uL   Symptomatic Influenza A/B, RSV, & SARS-CoV2 PCR (COVID-19) Nose    Specimen: Nose; Swab   Result Value Ref Range    Influenza A PCR Negative Negative    Influenza B PCR Negative Negative    RSV PCR Negative Negative    SARS CoV2 PCR Negative Negative    Narrative    Testing was performed using the Xpert Xpress CoV2/Flu/RSV Assay on the [employer] Instrument. This test should be ordered for the detection of SARS-CoV-2, influenza, and RSV viruses in individuals who meet clinical and/or epidemiological criteria. Test performance is unknown in asymptomatic patients. This test is for in vitro  diagnostic use under the FDA EUA for laboratories certified under CLIA to perform high or moderate complexity testing. This test has not been FDA cleared or approved. A negative result does not rule out the presence of PCR inhibitors in the specimen or target RNA in concentration below the limit of detection for the assay. If only one viral target is positive but coinfection with multiple targets is suspected, the sample should be re-tested with another FDA cleared, approved, or authorized test, if coinfection would change clinical management. This test was validated by the [facility]. These laboratories are certified under the Clinical Laboratory Improvement Amendments of 1988 (CLIA-88) as qualified to perform high complexity laboratory testing.   UA with Microscopic reflex to Culture    Specimen: Urine, Midstream   Result Value Ref Range    Color Urine Yellow Colorless, Straw, Light Yellow, Yellow    Appearance Urine Cloudy (A) Clear    Glucose Urine Negative Negative mg/dL    Bilirubin Urine Negative Negative    Ketones Urine 5 (A) Negative mg/dL    Specific Gravity Urine 1.009 1.003 - 1.035    Blood Urine Moderate (A) Negative    pH Urine 5.0 5.0 - 7.0    Protein Albumin Urine 100 (A) Negative mg/dL    Urobilinogen Urine Normal Normal, 2.0 mg/dL    Nitrite Urine Positive (A) Negative    Leukocyte Esterase Urine Large (A) Negative    Bacteria Urine Many (A) None Seen /HPF    WBC Clumps Urine Present (A) None Seen /HPF    Mucus Urine Present (A) None Seen /LPF    RBC Urine 13 (H) <=2 /HPF    WBC Urine >182 (H) <=5 /HPF    Squamous Epithelials Urine 1 <=1 /HPF    Narrative    Urine Culture ordered based on laboratory criteria       Medications   potassium chloride 10 mEq in 100 mL sterile water infusion (10 mEq Intravenous $New Bag 8/16/24 1957)   sulfamethoxazole-trimethoprim (BACTRIM DS) 800-160 MG per tablet 1 tablet (has no administration in time range)   sodium chloride 0.9% BOLUS 1,000 mL  "(1,000 mLs Intravenous $New Bag 8/16/24 1927)   ondansetron (ZOFRAN) injection 4 mg (4 mg Intravenous $Given 8/16/24 1946)   potassium chloride gurmeet ER (KLOR-CON M20) CR tablet 40 mEq (40 mEq Oral $Given 8/16/24 2030)   ketorolac (TORADOL) injection 15 mg (15 mg Intravenous $Given 8/16/24 1947)       Assessments & Plan (with Medical Decision Making)     79 year old female with history of BYRON, left breast cancer, GERD, osteopenia, hypothyroidism, s/p gastric bypass (in 2004), migraine headache, anxiety and depression who presents via EMS for evaluation of weakness, headache, diarrhea, and dizziness.  Patient reports she has been doing some strenuous activity this past week as she was \"threshing\", and 2 days ago was baling hay [place].  She has been having some low back pain since then and treating this with ice packs that has been helping.  Yesterday she donated blood. She has noted some urinary frequency over the last 24 hours.  This morning she awoke and had breakfast, sat in her recliner for a while watching TV. This afternoon she started having nausea, diarrhea, and feeling weak. 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 She started feeling worse and having headache so she called 911. She was given IV  ml bolus and 500 mg p.o. Tylenol by EMS.  She does have a history of migraines, but has not had one for many years.  Her current headache does not feel like a migraine.    On exam she appears distressed with headache and light sensitive.   Normal vital signs.  No abdominal tenderness.    No leukocytosis.    Hemoglobin 9.8 which is down from 12.8 a couple months ago.    --She did not donate blood yesterday.  She does have a history of iron deficiency anemia.  She is currently taking iron supplementation.  Potassium 2.9, calcium 7.8, glucose 145  --Her low potassium is likely related to diarrhea and decreased intake.  Urinalysis is concerning for infection with " positive nitrite, large leuk esterase, many bacteria, WBC clumps, 13 RBCs, and greater than 182 WBCs.    Patient was given IV normal saline 1 L bolus, IV Zofran, IV Toradol, and IV potassium chloride 10 mEq.  Her nausea resolved.  Her headache improved.  Her vital signs remained normal.  She was given 40 mEq p.o. potassium chloride for replacement and Bactrim DS 1 tablet for her UTI.      Patient was provided prescription for Bactrim DS 1 tablet twice a day for 7 days.  Urine cultures pending.  She was given prescription for potassium chloride 20 mEq twice a day for 3 days.  She was sent home with stool collection containers to test for C. difficile and enteric viral/bacterial culture.    Plan as follows:  Drink plenty of fluids to stay hydrated.    Bactrim DS 1 tablet twice a day for 7 days.  (Antibiotic for UTI)  -- You are given first dose here today.  --- Urine culture pending.  You will be contacted if you need to change the antibiotic based on the culture results.    Potassium chloride (20 mEq) 1 tablet twice a day for 3 days.   -- You are given first dose here today.  -- Eat foods high in potassium such as potatoes and bananas and leafy green vegetables.    Collect stool specimens and bring to the lab/drop off here.    Appointment for recheck in clinic in the next couple weeks and have your hemoglobin rechecked.    Return to the emergency department for vomiting, abdominal pain, increased weakness, or worse in any way.    New Prescriptions    No medications on file       Final diagnoses:   Diarrhea, unspecified type   Anemia, unspecified type - donated blood yesterday   UTI (urinary tract infection), bacterial   Hypokalemia   Nausea       8/16/2024   [facility] EMERGENCY DEPT       Millie, ARLENE Patterson CNP  08/16/24 9962

## 2024-08-16 NOTE — ED TRIAGE NOTES
Coming from home. Feeling weak, dizzy, nauseous, and headache. 500L NS and 500mg tylenol given via EMS. Strenuous activity the last week,has been working at the [employer].

## 2024-08-17 ENCOUNTER — APPOINTMENT (OUTPATIENT)
Dept: GENERAL RADIOLOGY | Facility: CLINIC | Age: 79
DRG: 522 | End: 2024-08-17
Attending: EMERGENCY MEDICINE
Payer: MEDICARE

## 2024-08-17 ENCOUNTER — ANESTHESIA (OUTPATIENT)
Dept: EMERGENCY MEDICINE | Facility: CLINIC | Age: 79
DRG: 522 | End: 2024-08-17
Payer: MEDICARE

## 2024-08-17 ENCOUNTER — APPOINTMENT (OUTPATIENT)
Dept: CT IMAGING | Facility: CLINIC | Age: 79
DRG: 522 | End: 2024-08-17
Attending: EMERGENCY MEDICINE
Payer: MEDICARE

## 2024-08-17 ENCOUNTER — HOSPITAL ENCOUNTER (INPATIENT)
Facility: CLINIC | Age: 79
LOS: 4 days | Discharge: NURSING, REHAB OR LONG-TERM CARE | DRG: 522 | End: 2024-08-21
Attending: EMERGENCY MEDICINE | Admitting: FAMILY MEDICINE
Payer: MEDICARE

## 2024-08-17 ENCOUNTER — ANESTHESIA EVENT (OUTPATIENT)
Dept: EMERGENCY MEDICINE | Facility: CLINIC | Age: 79
DRG: 522 | End: 2024-08-17
Payer: MEDICARE
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
## 2024-08-17 DIAGNOSIS — Z87.440 H/O URINARY TRACT INFECTION: ICD-10-CM
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
## 2024-08-17 DIAGNOSIS — S72.002A CLOSED DISPLACED FRACTURE OF LEFT FEMORAL NECK (H): ICD-10-CM

## 2024-08-17 DIAGNOSIS — N30.00 ACUTE CYSTITIS WITHOUT HEMATURIA: Primary | ICD-10-CM

## 2024-08-17 DIAGNOSIS — K59.03 DRUG-INDUCED CONSTIPATION: ICD-10-CM

## 2024-08-17 PROBLEM — D64.9 ANEMIA: Status: ACTIVE | Noted: 2024-08-17

## 2024-08-17 LAB
ALBUMIN SERPL BCG-MCNC: 3.2 G/DL (ref 3.5–5.2)
ALP SERPL-CCNC: 73 U/L (ref 40–150)
ALT SERPL W P-5'-P-CCNC: 20 U/L (ref 0–50)
ANION GAP SERPL CALCULATED.3IONS-SCNC: 8 MMOL/L (ref 7–15)
AST SERPL W P-5'-P-CCNC: 22 U/L (ref 0–45)
BACTERIA UR CULT: ABNORMAL
BASOPHILS # BLD AUTO: 0 10E3/UL (ref 0–0.2)
BASOPHILS NFR BLD AUTO: 0 %
BILIRUB SERPL-MCNC: 0.4 MG/DL
BUN SERPL-MCNC: 12.7 MG/DL (ref 8–23)
CALCIUM SERPL-MCNC: 8.5 MG/DL (ref 8.8–10.4)
CHLORIDE SERPL-SCNC: 101 MMOL/L (ref 98–107)
CREAT SERPL-MCNC: 0.98 MG/DL (ref 0.51–0.95)
EGFRCR SERPLBLD CKD-EPI 2021: 58 ML/MIN/1.73M2
EOSINOPHIL # BLD AUTO: 0 10E3/UL (ref 0–0.7)
EOSINOPHIL NFR BLD AUTO: 0 %
ERYTHROCYTE [DISTWIDTH] IN BLOOD BY AUTOMATED COUNT: 14.4 % (ref 10–15)
GLUCOSE SERPL-MCNC: 154 MG/DL (ref 70–99)
HCO3 SERPL-SCNC: 25 MMOL/L (ref 22–29)
HCT VFR BLD AUTO: 29.9 % (ref 35–47)
HGB BLD-MCNC: 9.9 G/DL (ref 11.7–15.7)
IMM GRANULOCYTES # BLD: 0 10E3/UL
IMM GRANULOCYTES NFR BLD: 0 %
LYMPHOCYTES # BLD AUTO: 0.3 10E3/UL (ref 0.8–5.3)
LYMPHOCYTES NFR BLD AUTO: 3 %
MCH RBC QN AUTO: 28.1 PG (ref 26.5–33)
MCHC RBC AUTO-ENTMCNC: 33.1 G/DL (ref 31.5–36.5)
MCV RBC AUTO: 85 FL (ref 78–100)
MONOCYTES # BLD AUTO: 0.5 10E3/UL (ref 0–1.3)
MONOCYTES NFR BLD AUTO: 6 %
NEUTROPHILS # BLD AUTO: 7.9 10E3/UL (ref 1.6–8.3)
NEUTROPHILS NFR BLD AUTO: 91 %
NRBC # BLD AUTO: 0 10E3/UL
NRBC BLD AUTO-RTO: 0 /100
PLATELET # BLD AUTO: 173 10E3/UL (ref 150–450)
POTASSIUM SERPL-SCNC: 4 MMOL/L (ref 3.4–5.3)
PROT SERPL-MCNC: 5.3 G/DL (ref 6.4–8.3)
RBC # BLD AUTO: 3.52 10E6/UL (ref 3.8–5.2)
SODIUM SERPL-SCNC: 134 MMOL/L (ref 135–145)
WBC # BLD AUTO: 8.7 10E3/UL (ref 4–11)

## 2024-08-17 PROCEDURE — 250N000011 HC RX IP 250 OP 636: Performed by: NURSE ANESTHETIST, CERTIFIED REGISTERED

## 2024-08-17 PROCEDURE — 72170 X-RAY EXAM OF PELVIS: CPT

## 2024-08-17 PROCEDURE — 72100 X-RAY EXAM L-S SPINE 2/3 VWS: CPT

## 2024-08-17 PROCEDURE — 73552 X-RAY EXAM OF FEMUR 2/>: CPT | Mod: LT

## 2024-08-17 PROCEDURE — 370N000003 HC ANESTHESIA WARD SERVICE: Performed by: NURSE ANESTHETIST, CERTIFIED REGISTERED

## 2024-08-17 PROCEDURE — G1010 CDSM STANSON: HCPCS

## 2024-08-17 PROCEDURE — 99285 EMERGENCY DEPT VISIT HI MDM: CPT | Mod: 25

## 2024-08-17 PROCEDURE — 258N000003 HC RX IP 258 OP 636: Performed by: EMERGENCY MEDICINE

## 2024-08-17 PROCEDURE — 85025 COMPLETE CBC W/AUTO DIFF WBC: CPT | Performed by: EMERGENCY MEDICINE

## 2024-08-17 PROCEDURE — 70450 CT HEAD/BRAIN W/O DYE: CPT | Mod: ME

## 2024-08-17 PROCEDURE — 36415 COLL VENOUS BLD VENIPUNCTURE: CPT | Performed by: EMERGENCY MEDICINE

## 2024-08-17 PROCEDURE — 99285 EMERGENCY DEPT VISIT HI MDM: CPT | Mod: 25 | Performed by: EMERGENCY MEDICINE

## 2024-08-17 PROCEDURE — 120N000004 HC R&B MS OVERFLOW

## 2024-08-17 PROCEDURE — 250N000011 HC RX IP 250 OP 636: Performed by: EMERGENCY MEDICINE

## 2024-08-17 PROCEDURE — 80053 COMPREHEN METABOLIC PANEL: CPT | Performed by: EMERGENCY MEDICINE

## 2024-08-17 RX ORDER — BUPROPION HYDROCHLORIDE 150 MG/1
1 TABLET ORAL DAILY
COMMUNITY
End: 2024-08-26

## 2024-08-17 RX ORDER — HYDROMORPHONE HYDROCHLORIDE 1 MG/ML
0.3 INJECTION, SOLUTION INTRAMUSCULAR; INTRAVENOUS; SUBCUTANEOUS ONCE
Status: COMPLETED | OUTPATIENT
Start: 2024-08-17 | End: 2024-08-17

## 2024-08-17 RX ORDER — SODIUM CHLORIDE 9 MG/ML
1000 INJECTION, SOLUTION INTRAVENOUS CONTINUOUS
Status: DISCONTINUED | OUTPATIENT
Start: 2024-08-17 | End: 2024-08-19

## 2024-08-17 RX ORDER — COVID-19 ANTIGEN TEST
440 KIT MISCELLANEOUS PRN
Status: ON HOLD | COMMUNITY
End: 2024-08-21

## 2024-08-17 RX ORDER — CARBOXYMETHYLCELLULOSE SODIUM 10 MG/ML
1 GEL OPHTHALMIC 2 TIMES DAILY
COMMUNITY

## 2024-08-17 RX ORDER — CEFAZOLIN 2 G/1
2 INJECTION, POWDER, FOR SOLUTION INTRAVENOUS
Status: CANCELLED | OUTPATIENT
Start: 2024-08-17

## 2024-08-17 RX ORDER — DIAZEPAM 10 MG/2ML
2.5 INJECTION, SOLUTION INTRAMUSCULAR; INTRAVENOUS ONCE
Status: COMPLETED | OUTPATIENT
Start: 2024-08-17 | End: 2024-08-17

## 2024-08-17 RX ORDER — CEFAZOLIN 2 G/1
2 INJECTION, POWDER, FOR SOLUTION INTRAVENOUS SEE ADMIN INSTRUCTIONS
Status: CANCELLED | OUTPATIENT
Start: 2024-08-17

## 2024-08-17 RX ORDER — ONDANSETRON 2 MG/ML
4 INJECTION INTRAMUSCULAR; INTRAVENOUS ONCE
Status: COMPLETED | OUTPATIENT
Start: 2024-08-17 | End: 2024-08-17

## 2024-08-17 RX ORDER — PANTOPRAZOLE SODIUM 40 MG/1
20 TABLET, DELAYED RELEASE ORAL DAILY
COMMUNITY
Start: 2024-07-03

## 2024-08-17 RX ORDER — TRANEXAMIC ACID 650 MG/1
1950 TABLET ORAL ONCE
Status: CANCELLED | OUTPATIENT
Start: 2024-08-17 | End: 2024-08-17

## 2024-08-17 RX ORDER — NITROFURANTOIN 25; 75 MG/1; MG/1
100 CAPSULE ORAL EVERY 12 HOURS SCHEDULED
Status: DISCONTINUED | OUTPATIENT
Start: 2024-08-17 | End: 2024-08-21 | Stop reason: HOSPADM

## 2024-08-17 RX ORDER — HYDROMORPHONE HCL IN WATER/PF 6 MG/30 ML
0.2 PATIENT CONTROLLED ANALGESIA SYRINGE INTRAVENOUS ONCE
Status: COMPLETED | OUTPATIENT
Start: 2024-08-17 | End: 2024-08-17

## 2024-08-17 RX ADMIN — SODIUM CHLORIDE 500 ML: 900 INJECTION, SOLUTION INTRAVENOUS at 19:15

## 2024-08-17 RX ADMIN — HYDROMORPHONE HYDROCHLORIDE 0.2 MG: 0.2 INJECTION, SOLUTION INTRAMUSCULAR; INTRAVENOUS; SUBCUTANEOUS at 20:19

## 2024-08-17 RX ADMIN — SODIUM CHLORIDE 1000 ML: 9 INJECTION, SOLUTION INTRAVENOUS at 22:53

## 2024-08-17 RX ADMIN — DEXAMETHASONE SODIUM PHOSPHATE 10 MG: 10 INJECTION, SOLUTION INTRAMUSCULAR; INTRAVENOUS at 23:57

## 2024-08-17 RX ADMIN — ONDANSETRON 4 MG: 2 INJECTION INTRAMUSCULAR; INTRAVENOUS at 19:16

## 2024-08-17 RX ADMIN — DIAZEPAM 2.5 MG: 5 INJECTION INTRAMUSCULAR; INTRAVENOUS at 20:20

## 2024-08-17 RX ADMIN — HYDROMORPHONE HYDROCHLORIDE 0.3 MG: 1 INJECTION, SOLUTION INTRAMUSCULAR; INTRAVENOUS; SUBCUTANEOUS at 19:15

## 2024-08-17 RX ADMIN — HYDROMORPHONE HYDROCHLORIDE 0.3 MG: 1 INJECTION, SOLUTION INTRAMUSCULAR; INTRAVENOUS; SUBCUTANEOUS at 22:53

## 2024-08-17 RX ADMIN — ROPIVACAINE HYDROCHLORIDE 20 ML: 5 INJECTION, SOLUTION EPIDURAL; INFILTRATION; PERINEURAL at 23:57

## 2024-08-17 ASSESSMENT — COLUMBIA-SUICIDE SEVERITY RATING SCALE - C-SSRS
2. HAVE YOU ACTUALLY HAD ANY THOUGHTS OF KILLING YOURSELF IN THE PAST MONTH?: NO
6. HAVE YOU EVER DONE ANYTHING, STARTED TO DO ANYTHING, OR PREPARED TO DO ANYTHING TO END YOUR LIFE?: NO
1. IN THE PAST MONTH, HAVE YOU WISHED YOU WERE DEAD OR WISHED YOU COULD GO TO SLEEP AND NOT WAKE UP?: NO

## 2024-08-17 ASSESSMENT — ACTIVITIES OF DAILY LIVING (ADL)
ADLS_ACUITY_SCORE: 35

## 2024-08-17 NOTE — ED TRIAGE NOTES
Felt weak and fell, left hip pain and abrasion at bridge of nose, hit head, is not on  blood thinners and had no LOC, sleepy due to getting fentanyl 100 mg by EMS     Triage Assessment (Adult)       Row Name 08/17/24 5924          Triage Assessment    Airway WDL WDL        Respiratory WDL    Respiratory WDL WDL        Peripheral/Neurovascular WDL    Peripheral Neurovascular WDL WDL        Cognitive/Neuro/Behavioral WDL    Cognitive/Neuro/Behavioral WDL WDL

## 2024-08-17 NOTE — LETTER
Transition Communication Hand-off for Care Transitions to Next Level of Care Provider    Name: Jeanna Smith  : 1945  MRN #: [id number]  Primary Care Provider: DAVID SIEGEL     Primary Clinic: [address] [place] [id number]     Reason for Hospitalization:  H/O urinary tract infection [Z87.440]  Fall at home, initial encounter [W19.XXXA, Y92.009]  Closed displaced fracture of left femoral neck (H) [S72.002A]  Admit Date/Time: 2024  5:59 PM  Discharge Date: 24  Payor Source: Payor: MEDICARE / Plan: MEDICARE / Product Type: Medicare /          Reason for Communication Hand-off Referral: Other TCU Discharge     Discharge Plan:  Discharge Plan:      Flowsheet Row Most Recent Value   Disposition Comments TCU placement--[facility]           Discharge Needs Assessment:  Needs      Flowsheet Row Most Recent Value   Equipment Currently Used at Home none   # of Referrals Placed by CM External Care Coordination, Post Acute Facilities          Any outstanding tests or procedures:        Referrals       Future Labs/Procedures    Occupational Therapy Adult Consult     Comments:    Evaluate and treat as clinically indicated.    Reason:  ADLs    Physical Therapy Adult Consult     Comments:    Evaluate and treat as clinically indicated.    Reason:  Post op left hip ORIF            Supplies       Future Labs/Procedures    [facility]     Comments:    DME Documentation: Describe the reason for need to support medical necessity: Impaired gait status post hip surgery. I, the undersigned, certify that the above prescribed supplies are medically necessary for this patient and is both reasonable and necessary in reference to accepted standards of medical practice in the treatment of this patient's condition and is not prescribed as a convenience.          JESUS MANUEL Menon    AVS/Discharge Summary is the source of truth; this is a helpful guide for improved communication of patient story

## 2024-08-18 ENCOUNTER — APPOINTMENT (OUTPATIENT)
Dept: GENERAL RADIOLOGY | Facility: CLINIC | Age: 79
DRG: 522 | End: 2024-08-18
Attending: ORTHOPAEDIC SURGERY
Payer: MEDICARE

## 2024-08-18 ENCOUNTER — ANESTHESIA EVENT (OUTPATIENT)
Dept: SURGERY | Facility: CLINIC | Age: 79
DRG: 522 | End: 2024-08-18
Payer: MEDICARE

## 2024-08-18 ENCOUNTER — ANESTHESIA (OUTPATIENT)
Dept: SURGERY | Facility: CLINIC | Age: 79
DRG: 522 | End: 2024-08-18
Payer: MEDICARE

## 2024-08-18 PROBLEM — Z87.440 H/O URINARY TRACT INFECTION: Status: RESOLVED | Noted: 2024-08-17 | Resolved: 2024-08-18

## 2024-08-18 LAB
ANION GAP SERPL CALCULATED.3IONS-SCNC: 8 MMOL/L (ref 7–15)
BASOPHILS # BLD AUTO: 0 10E3/UL (ref 0–0.2)
BASOPHILS NFR BLD AUTO: 0 %
BUN SERPL-MCNC: 11.9 MG/DL (ref 8–23)
CALCIUM SERPL-MCNC: 9 MG/DL (ref 8.8–10.4)
CHLORIDE SERPL-SCNC: 104 MMOL/L (ref 98–107)
CREAT SERPL-MCNC: 0.98 MG/DL (ref 0.51–0.95)
EGFRCR SERPLBLD CKD-EPI 2021: 58 ML/MIN/1.73M2
EOSINOPHIL # BLD AUTO: 0 10E3/UL (ref 0–0.7)
EOSINOPHIL NFR BLD AUTO: 0 %
ERYTHROCYTE [DISTWIDTH] IN BLOOD BY AUTOMATED COUNT: 14.3 % (ref 10–15)
GLUCOSE SERPL-MCNC: 157 MG/DL (ref 70–99)
HCO3 SERPL-SCNC: 25 MMOL/L (ref 22–29)
HCT VFR BLD AUTO: 31.4 % (ref 35–47)
HGB BLD-MCNC: 10.3 G/DL (ref 11.7–15.7)
IMM GRANULOCYTES # BLD: 0 10E3/UL
IMM GRANULOCYTES NFR BLD: 1 %
LYMPHOCYTES # BLD AUTO: 0.2 10E3/UL (ref 0.8–5.3)
LYMPHOCYTES NFR BLD AUTO: 3 %
MAGNESIUM SERPL-MCNC: 2 MG/DL (ref 1.7–2.3)
MCH RBC QN AUTO: 28.1 PG (ref 26.5–33)
MCHC RBC AUTO-ENTMCNC: 32.8 G/DL (ref 31.5–36.5)
MCV RBC AUTO: 86 FL (ref 78–100)
MONOCYTES # BLD AUTO: 0.2 10E3/UL (ref 0–1.3)
MONOCYTES NFR BLD AUTO: 3 %
NEUTROPHILS # BLD AUTO: 7.4 10E3/UL (ref 1.6–8.3)
NEUTROPHILS NFR BLD AUTO: 94 %
NRBC # BLD AUTO: 0 10E3/UL
NRBC BLD AUTO-RTO: 0 /100
PHOSPHATE SERPL-MCNC: 2.9 MG/DL (ref 2.5–4.5)
PLATELET # BLD AUTO: 178 10E3/UL (ref 150–450)
POTASSIUM SERPL-SCNC: 4.2 MMOL/L (ref 3.4–5.3)
RBC # BLD AUTO: 3.67 10E6/UL (ref 3.8–5.2)
SODIUM SERPL-SCNC: 137 MMOL/L (ref 135–145)
WBC # BLD AUTO: 7.9 10E3/UL (ref 4–11)

## 2024-08-18 PROCEDURE — 258N000003 HC RX IP 258 OP 636

## 2024-08-18 PROCEDURE — 83735 ASSAY OF MAGNESIUM: CPT | Performed by: FAMILY MEDICINE

## 2024-08-18 PROCEDURE — 250N000013 HC RX MED GY IP 250 OP 250 PS 637: Performed by: FAMILY MEDICINE

## 2024-08-18 PROCEDURE — 84100 ASSAY OF PHOSPHORUS: CPT | Performed by: FAMILY MEDICINE

## 2024-08-18 PROCEDURE — 272N000001 HC OR GENERAL SUPPLY STERILE: Performed by: ORTHOPAEDIC SURGERY

## 2024-08-18 PROCEDURE — 999N000065 XR PELVIS AND HIP LEFT 1 VIEW

## 2024-08-18 PROCEDURE — 93005 ELECTROCARDIOGRAM TRACING: CPT

## 2024-08-18 PROCEDURE — 360N000077 HC SURGERY LEVEL 4, PER MIN: Performed by: ORTHOPAEDIC SURGERY

## 2024-08-18 PROCEDURE — 250N000013 HC RX MED GY IP 250 OP 250 PS 637: Performed by: ORTHOPAEDIC SURGERY

## 2024-08-18 PROCEDURE — 36415 COLL VENOUS BLD VENIPUNCTURE: CPT | Performed by: FAMILY MEDICINE

## 2024-08-18 PROCEDURE — 370N000017 HC ANESTHESIA TECHNICAL FEE, PER MIN: Performed by: ORTHOPAEDIC SURGERY

## 2024-08-18 PROCEDURE — 250N000013 HC RX MED GY IP 250 OP 250 PS 637: Performed by: INTERNAL MEDICINE

## 2024-08-18 PROCEDURE — 80048 BASIC METABOLIC PNL TOTAL CA: CPT | Performed by: FAMILY MEDICINE

## 2024-08-18 PROCEDURE — 258N000003 HC RX IP 258 OP 636: Performed by: EMERGENCY MEDICINE

## 2024-08-18 PROCEDURE — 710N000009 HC RECOVERY PHASE 1, LEVEL 1, PER MIN: Performed by: ORTHOPAEDIC SURGERY

## 2024-08-18 PROCEDURE — 250N000025 HC SEVOFLURANE, PER MIN: Performed by: ORTHOPAEDIC SURGERY

## 2024-08-18 PROCEDURE — 250N000011 HC RX IP 250 OP 636: Performed by: INTERNAL MEDICINE

## 2024-08-18 PROCEDURE — 999N000157 HC STATISTIC RCP TIME EA 10 MIN

## 2024-08-18 PROCEDURE — 0SRS0J9 REPLACEMENT OF LEFT HIP JOINT, FEMORAL SURFACE WITH SYNTHETIC SUBSTITUTE, CEMENTED, OPEN APPROACH: ICD-10-PCS | Performed by: ORTHOPAEDIC SURGERY

## 2024-08-18 PROCEDURE — 250N000011 HC RX IP 250 OP 636: Performed by: NURSE ANESTHETIST, CERTIFIED REGISTERED

## 2024-08-18 PROCEDURE — 85025 COMPLETE CBC W/AUTO DIFF WBC: CPT | Performed by: FAMILY MEDICINE

## 2024-08-18 PROCEDURE — C1713 ANCHOR/SCREW BN/BN,TIS/BN: HCPCS | Performed by: ORTHOPAEDIC SURGERY

## 2024-08-18 PROCEDURE — 120N000001 HC R&B MED SURG/OB

## 2024-08-18 PROCEDURE — 250N000011 HC RX IP 250 OP 636: Performed by: ORTHOPAEDIC SURGERY

## 2024-08-18 PROCEDURE — C1776 JOINT DEVICE (IMPLANTABLE): HCPCS | Performed by: ORTHOPAEDIC SURGERY

## 2024-08-18 PROCEDURE — 250N000009 HC RX 250

## 2024-08-18 PROCEDURE — 250N000011 HC RX IP 250 OP 636

## 2024-08-18 PROCEDURE — 258N000001 HC RX 258: Performed by: ORTHOPAEDIC SURGERY

## 2024-08-18 PROCEDURE — 258N000003 HC RX IP 258 OP 636: Performed by: ORTHOPAEDIC SURGERY

## 2024-08-18 PROCEDURE — 99232 SBSQ HOSP IP/OBS MODERATE 35: CPT | Performed by: INTERNAL MEDICINE

## 2024-08-18 DEVICE — SELF CENTERING BI-POLAR HEAD 28MM ID 48MM OD
Type: IMPLANTABLE DEVICE | Site: HIP | Status: FUNCTIONAL
Brand: SELF CENTERING

## 2024-08-18 DEVICE — ARTICUL/EZE FEMORAL HEAD DIAMETER 28MM +1.5 12/14 TAPER
Type: IMPLANTABLE DEVICE | Site: HIP | Status: FUNCTIONAL
Brand: ARTICUL/EZE

## 2024-08-18 DEVICE — FULL DOSE BONE CEMENT, 10 PACK CATALOG NUMBER IS 6191-1-010
Type: IMPLANTABLE DEVICE | Site: HIP | Status: FUNCTIONAL
Brand: SIMPLEX

## 2024-08-18 DEVICE — SUMMIT FEMORAL STEM 12/14 TAPER BASIC CEMENTED SIZE 4 114MM
Type: IMPLANTABLE DEVICE | Site: HIP | Status: FUNCTIONAL
Brand: SUMMIT

## 2024-08-18 DEVICE — CEMENTRALIZER STEM CENTRALIZER 9.25MM CEMENTED
Type: IMPLANTABLE DEVICE | Site: HIP | Status: FUNCTIONAL
Brand: CEMENTRALIZER

## 2024-08-18 RX ORDER — FENTANYL CITRATE 50 UG/ML
INJECTION, SOLUTION INTRAMUSCULAR; INTRAVENOUS PRN
Status: DISCONTINUED | OUTPATIENT
Start: 2024-08-18 | End: 2024-08-18

## 2024-08-18 RX ORDER — ONDANSETRON 2 MG/ML
INJECTION INTRAMUSCULAR; INTRAVENOUS PRN
Status: DISCONTINUED | OUTPATIENT
Start: 2024-08-18 | End: 2024-08-18

## 2024-08-18 RX ORDER — OXYCODONE HYDROCHLORIDE 5 MG/1
10 TABLET ORAL EVERY 4 HOURS PRN
Status: DISCONTINUED | OUTPATIENT
Start: 2024-08-18 | End: 2024-08-21 | Stop reason: HOSPADM

## 2024-08-18 RX ORDER — AMOXICILLIN 250 MG
2 CAPSULE ORAL 2 TIMES DAILY PRN
Status: DISCONTINUED | OUTPATIENT
Start: 2024-08-18 | End: 2024-08-21 | Stop reason: HOSPADM

## 2024-08-18 RX ORDER — CEFAZOLIN SODIUM 1 G/3ML
1 INJECTION, POWDER, FOR SOLUTION INTRAMUSCULAR; INTRAVENOUS EVERY 8 HOURS
Status: COMPLETED | OUTPATIENT
Start: 2024-08-18 | End: 2024-08-19

## 2024-08-18 RX ORDER — HYDROMORPHONE HCL IN WATER/PF 6 MG/30 ML
0.2 PATIENT CONTROLLED ANALGESIA SYRINGE INTRAVENOUS
Status: DISCONTINUED | OUTPATIENT
Start: 2024-08-18 | End: 2024-08-21 | Stop reason: HOSPADM

## 2024-08-18 RX ORDER — CARBOXYMETHYLCELLULOSE SODIUM 5 MG/ML
1 SOLUTION/ DROPS OPHTHALMIC 2 TIMES DAILY
Status: DISCONTINUED | OUTPATIENT
Start: 2024-08-18 | End: 2024-08-21 | Stop reason: HOSPADM

## 2024-08-18 RX ORDER — SODIUM CHLORIDE, SODIUM LACTATE, POTASSIUM CHLORIDE, CALCIUM CHLORIDE 600; 310; 30; 20 MG/100ML; MG/100ML; MG/100ML; MG/100ML
INJECTION, SOLUTION INTRAVENOUS CONTINUOUS
Status: DISCONTINUED | OUTPATIENT
Start: 2024-08-18 | End: 2024-08-21 | Stop reason: HOSPADM

## 2024-08-18 RX ORDER — BUPROPION HYDROCHLORIDE 150 MG/1
150 TABLET ORAL DAILY
Status: DISCONTINUED | OUTPATIENT
Start: 2024-08-18 | End: 2024-08-21 | Stop reason: HOSPADM

## 2024-08-18 RX ORDER — FENTANYL CITRATE 50 UG/ML
50 INJECTION, SOLUTION INTRAMUSCULAR; INTRAVENOUS EVERY 5 MIN PRN
Status: DISCONTINUED | OUTPATIENT
Start: 2024-08-18 | End: 2024-08-18 | Stop reason: HOSPADM

## 2024-08-18 RX ORDER — BISACODYL 10 MG
10 SUPPOSITORY, RECTAL RECTAL DAILY PRN
Status: DISCONTINUED | OUTPATIENT
Start: 2024-08-21 | End: 2024-08-21 | Stop reason: HOSPADM

## 2024-08-18 RX ORDER — ONDANSETRON 4 MG/1
4 TABLET, ORALLY DISINTEGRATING ORAL EVERY 6 HOURS PRN
Status: DISCONTINUED | OUTPATIENT
Start: 2024-08-18 | End: 2024-08-21 | Stop reason: HOSPADM

## 2024-08-18 RX ORDER — HYDROMORPHONE HCL IN WATER/PF 6 MG/30 ML
0.4 PATIENT CONTROLLED ANALGESIA SYRINGE INTRAVENOUS EVERY 5 MIN PRN
Status: DISCONTINUED | OUTPATIENT
Start: 2024-08-18 | End: 2024-08-18 | Stop reason: HOSPADM

## 2024-08-18 RX ORDER — CALCIUM CARBONATE 500 MG/1
1000 TABLET, CHEWABLE ORAL 4 TIMES DAILY PRN
Status: DISCONTINUED | OUTPATIENT
Start: 2024-08-18 | End: 2024-08-21 | Stop reason: HOSPADM

## 2024-08-18 RX ORDER — GLYCOPYRROLATE 0.2 MG/ML
INJECTION, SOLUTION INTRAMUSCULAR; INTRAVENOUS PRN
Status: DISCONTINUED | OUTPATIENT
Start: 2024-08-18 | End: 2024-08-18

## 2024-08-18 RX ORDER — ONDANSETRON 2 MG/ML
4 INJECTION INTRAMUSCULAR; INTRAVENOUS EVERY 30 MIN PRN
Status: DISCONTINUED | OUTPATIENT
Start: 2024-08-18 | End: 2024-08-18 | Stop reason: HOSPADM

## 2024-08-18 RX ORDER — LIDOCAINE 40 MG/G
CREAM TOPICAL
Status: DISCONTINUED | OUTPATIENT
Start: 2024-08-18 | End: 2024-08-21 | Stop reason: HOSPADM

## 2024-08-18 RX ORDER — HYDROMORPHONE HCL IN WATER/PF 6 MG/30 ML
0.2 PATIENT CONTROLLED ANALGESIA SYRINGE INTRAVENOUS
Status: DISCONTINUED | OUTPATIENT
Start: 2024-08-18 | End: 2024-08-18

## 2024-08-18 RX ORDER — ROPIVACAINE HYDROCHLORIDE 5 MG/ML
INJECTION, SOLUTION EPIDURAL; INFILTRATION; PERINEURAL PRN
Status: DISCONTINUED | OUTPATIENT
Start: 2024-08-17 | End: 2024-08-18

## 2024-08-18 RX ORDER — SODIUM CHLORIDE, SODIUM LACTATE, POTASSIUM CHLORIDE, CALCIUM CHLORIDE 600; 310; 30; 20 MG/100ML; MG/100ML; MG/100ML; MG/100ML
INJECTION, SOLUTION INTRAVENOUS CONTINUOUS
Status: DISCONTINUED | OUTPATIENT
Start: 2024-08-18 | End: 2024-08-18 | Stop reason: HOSPADM

## 2024-08-18 RX ORDER — DEXAMETHASONE SODIUM PHOSPHATE 4 MG/ML
INJECTION, SOLUTION INTRA-ARTICULAR; INTRALESIONAL; INTRAMUSCULAR; INTRAVENOUS; SOFT TISSUE PRN
Status: DISCONTINUED | OUTPATIENT
Start: 2024-08-18 | End: 2024-08-18

## 2024-08-18 RX ORDER — OXYCODONE HYDROCHLORIDE 5 MG/1
5 TABLET ORAL EVERY 4 HOURS PRN
Status: DISCONTINUED | OUTPATIENT
Start: 2024-08-18 | End: 2024-08-21 | Stop reason: HOSPADM

## 2024-08-18 RX ORDER — DEXAMETHASONE SODIUM PHOSPHATE 4 MG/ML
4 INJECTION, SOLUTION INTRA-ARTICULAR; INTRALESIONAL; INTRAMUSCULAR; INTRAVENOUS; SOFT TISSUE
Status: DISCONTINUED | OUTPATIENT
Start: 2024-08-18 | End: 2024-08-18 | Stop reason: HOSPADM

## 2024-08-18 RX ORDER — HYDROMORPHONE HCL IN WATER/PF 6 MG/30 ML
0.2 PATIENT CONTROLLED ANALGESIA SYRINGE INTRAVENOUS EVERY 5 MIN PRN
Status: DISCONTINUED | OUTPATIENT
Start: 2024-08-18 | End: 2024-08-18 | Stop reason: HOSPADM

## 2024-08-18 RX ORDER — ACETAMINOPHEN 325 MG/1
975 TABLET ORAL EVERY 8 HOURS
Status: COMPLETED | OUTPATIENT
Start: 2024-08-18 | End: 2024-08-21

## 2024-08-18 RX ORDER — NALOXONE HYDROCHLORIDE 0.4 MG/ML
0.2 INJECTION, SOLUTION INTRAMUSCULAR; INTRAVENOUS; SUBCUTANEOUS
Status: DISCONTINUED | OUTPATIENT
Start: 2024-08-18 | End: 2024-08-21 | Stop reason: HOSPADM

## 2024-08-18 RX ORDER — NALOXONE HYDROCHLORIDE 0.4 MG/ML
0.4 INJECTION, SOLUTION INTRAMUSCULAR; INTRAVENOUS; SUBCUTANEOUS
Status: DISCONTINUED | OUTPATIENT
Start: 2024-08-18 | End: 2024-08-21 | Stop reason: HOSPADM

## 2024-08-18 RX ORDER — POLYETHYLENE GLYCOL 3350 17 G/17G
17 POWDER, FOR SOLUTION ORAL DAILY
Status: DISCONTINUED | OUTPATIENT
Start: 2024-08-19 | End: 2024-08-21 | Stop reason: HOSPADM

## 2024-08-18 RX ORDER — SODIUM CHLORIDE, SODIUM LACTATE, POTASSIUM CHLORIDE, CALCIUM CHLORIDE 600; 310; 30; 20 MG/100ML; MG/100ML; MG/100ML; MG/100ML
INJECTION, SOLUTION INTRAVENOUS CONTINUOUS PRN
Status: DISCONTINUED | OUTPATIENT
Start: 2024-08-18 | End: 2024-08-18

## 2024-08-18 RX ORDER — DEXAMETHASONE SODIUM PHOSPHATE 10 MG/ML
INJECTION, SOLUTION INTRAMUSCULAR; INTRAVENOUS PRN
Status: DISCONTINUED | OUTPATIENT
Start: 2024-08-17 | End: 2024-08-18

## 2024-08-18 RX ORDER — ACETAMINOPHEN 500 MG
500-1000 TABLET ORAL EVERY 6 HOURS PRN
Status: DISCONTINUED | OUTPATIENT
Start: 2024-08-18 | End: 2024-08-18

## 2024-08-18 RX ORDER — FERROUS SULFATE 325(65) MG
325 TABLET ORAL EVERY EVENING
Status: DISCONTINUED | OUTPATIENT
Start: 2024-08-18 | End: 2024-08-21 | Stop reason: HOSPADM

## 2024-08-18 RX ORDER — FENTANYL CITRATE 50 UG/ML
25 INJECTION, SOLUTION INTRAMUSCULAR; INTRAVENOUS EVERY 5 MIN PRN
Status: DISCONTINUED | OUTPATIENT
Start: 2024-08-18 | End: 2024-08-18 | Stop reason: HOSPADM

## 2024-08-18 RX ORDER — ACETAMINOPHEN 325 MG/1
650 TABLET ORAL EVERY 4 HOURS PRN
Status: DISCONTINUED | OUTPATIENT
Start: 2024-08-21 | End: 2024-08-21 | Stop reason: HOSPADM

## 2024-08-18 RX ORDER — ONDANSETRON 4 MG/1
4 TABLET, ORALLY DISINTEGRATING ORAL EVERY 30 MIN PRN
Status: DISCONTINUED | OUTPATIENT
Start: 2024-08-18 | End: 2024-08-18 | Stop reason: HOSPADM

## 2024-08-18 RX ORDER — CEFAZOLIN SODIUM/WATER 2 G/20 ML
SYRINGE (ML) INTRAVENOUS PRN
Status: DISCONTINUED | OUTPATIENT
Start: 2024-08-18 | End: 2024-08-18

## 2024-08-18 RX ORDER — AMOXICILLIN 250 MG
1 CAPSULE ORAL 2 TIMES DAILY
Status: DISCONTINUED | OUTPATIENT
Start: 2024-08-18 | End: 2024-08-21 | Stop reason: HOSPADM

## 2024-08-18 RX ORDER — KETAMINE HYDROCHLORIDE 10 MG/ML
INJECTION INTRAMUSCULAR; INTRAVENOUS PRN
Status: DISCONTINUED | OUTPATIENT
Start: 2024-08-18 | End: 2024-08-18

## 2024-08-18 RX ORDER — PROCHLORPERAZINE MALEATE 5 MG
5 TABLET ORAL EVERY 6 HOURS PRN
Status: DISCONTINUED | OUTPATIENT
Start: 2024-08-18 | End: 2024-08-21 | Stop reason: HOSPADM

## 2024-08-18 RX ORDER — ASPIRIN 81 MG/1
81 TABLET ORAL 2 TIMES DAILY
Status: DISCONTINUED | OUTPATIENT
Start: 2024-08-18 | End: 2024-08-21 | Stop reason: HOSPADM

## 2024-08-18 RX ORDER — PANTOPRAZOLE SODIUM 40 MG/1
40 TABLET, DELAYED RELEASE ORAL DAILY
Status: DISCONTINUED | OUTPATIENT
Start: 2024-08-18 | End: 2024-08-21 | Stop reason: HOSPADM

## 2024-08-18 RX ORDER — PROPOFOL 10 MG/ML
INJECTION, EMULSION INTRAVENOUS PRN
Status: DISCONTINUED | OUTPATIENT
Start: 2024-08-18 | End: 2024-08-18

## 2024-08-18 RX ORDER — ONDANSETRON 2 MG/ML
4 INJECTION INTRAMUSCULAR; INTRAVENOUS EVERY 6 HOURS PRN
Status: DISCONTINUED | OUTPATIENT
Start: 2024-08-18 | End: 2024-08-21 | Stop reason: HOSPADM

## 2024-08-18 RX ORDER — HYDROMORPHONE HCL IN WATER/PF 6 MG/30 ML
0.4 PATIENT CONTROLLED ANALGESIA SYRINGE INTRAVENOUS
Status: DISCONTINUED | OUTPATIENT
Start: 2024-08-18 | End: 2024-08-21 | Stop reason: HOSPADM

## 2024-08-18 RX ORDER — THYROID 30 MG/1
60 TABLET ORAL DAILY
Status: DISCONTINUED | OUTPATIENT
Start: 2024-08-18 | End: 2024-08-21 | Stop reason: HOSPADM

## 2024-08-18 RX ORDER — NALOXONE HYDROCHLORIDE 0.4 MG/ML
0.1 INJECTION, SOLUTION INTRAMUSCULAR; INTRAVENOUS; SUBCUTANEOUS
Status: DISCONTINUED | OUTPATIENT
Start: 2024-08-18 | End: 2024-08-18 | Stop reason: HOSPADM

## 2024-08-18 RX ORDER — LIDOCAINE 40 MG/G
CREAM TOPICAL
Status: DISCONTINUED | OUTPATIENT
Start: 2024-08-18 | End: 2024-08-19

## 2024-08-18 RX ORDER — AMOXICILLIN 250 MG
1 CAPSULE ORAL 2 TIMES DAILY PRN
Status: DISCONTINUED | OUTPATIENT
Start: 2024-08-18 | End: 2024-08-21 | Stop reason: HOSPADM

## 2024-08-18 RX ADMIN — NITROFURANTOIN (MONOHYDRATE/MACROCRYSTALS) 100 MG: 75; 25 CAPSULE ORAL at 19:35

## 2024-08-18 RX ADMIN — FENTANYL CITRATE 50 MCG: 50 INJECTION INTRAMUSCULAR; INTRAVENOUS at 15:49

## 2024-08-18 RX ADMIN — GLYCOPYRROLATE 0.2 MG: 0.2 INJECTION, SOLUTION INTRAMUSCULAR; INTRAVENOUS at 16:21

## 2024-08-18 RX ADMIN — MIDAZOLAM 2 MG: 1 INJECTION INTRAMUSCULAR; INTRAVENOUS at 15:01

## 2024-08-18 RX ADMIN — CARBOXYMETHYLCELLULOSE SODIUM 1 DROP: 0.5 SOLUTION/ DROPS OPHTHALMIC at 08:32

## 2024-08-18 RX ADMIN — SENNOSIDES AND DOCUSATE SODIUM 1 TABLET: 50; 8.6 TABLET ORAL at 19:35

## 2024-08-18 RX ADMIN — KETAMINE HYDROCHLORIDE 10 MG: 10 INJECTION INTRAMUSCULAR; INTRAVENOUS at 16:20

## 2024-08-18 RX ADMIN — ROCURONIUM BROMIDE 20 MG: 50 INJECTION, SOLUTION INTRAVENOUS at 15:40

## 2024-08-18 RX ADMIN — NITROFURANTOIN (MONOHYDRATE/MACROCRYSTALS) 100 MG: 75; 25 CAPSULE ORAL at 00:17

## 2024-08-18 RX ADMIN — HYDROMORPHONE HYDROCHLORIDE 0.2 MG: 0.2 INJECTION, SOLUTION INTRAMUSCULAR; INTRAVENOUS; SUBCUTANEOUS at 02:13

## 2024-08-18 RX ADMIN — FENTANYL CITRATE 50 MCG: 50 INJECTION INTRAMUSCULAR; INTRAVENOUS at 16:10

## 2024-08-18 RX ADMIN — ACETAMINOPHEN 1000 MG: 500 TABLET, FILM COATED ORAL at 11:21

## 2024-08-18 RX ADMIN — FENTANYL CITRATE 50 MCG: 50 INJECTION INTRAMUSCULAR; INTRAVENOUS at 16:18

## 2024-08-18 RX ADMIN — PROPOFOL 20 MCG/KG/MIN: 10 INJECTION, EMULSION INTRAVENOUS at 16:14

## 2024-08-18 RX ADMIN — ROCURONIUM BROMIDE 50 MG: 50 INJECTION, SOLUTION INTRAVENOUS at 15:27

## 2024-08-18 RX ADMIN — SODIUM CHLORIDE 1000 ML: 9 INJECTION, SOLUTION INTRAVENOUS at 05:48

## 2024-08-18 RX ADMIN — OXYCODONE HYDROCHLORIDE 5 MG: 5 TABLET ORAL at 22:34

## 2024-08-18 RX ADMIN — SODIUM CHLORIDE, POTASSIUM CHLORIDE, SODIUM LACTATE AND CALCIUM CHLORIDE: 600; 310; 30; 20 INJECTION, SOLUTION INTRAVENOUS at 20:42

## 2024-08-18 RX ADMIN — Medication 2 G: at 15:01

## 2024-08-18 RX ADMIN — SUGAMMADEX 200 MG: 100 INJECTION, SOLUTION INTRAVENOUS at 16:52

## 2024-08-18 RX ADMIN — ASPIRIN 81 MG: 81 TABLET, COATED ORAL at 19:36

## 2024-08-18 RX ADMIN — SODIUM CHLORIDE 1000 ML: 9 INJECTION, SOLUTION INTRAVENOUS at 08:38

## 2024-08-18 RX ADMIN — KETAMINE HYDROCHLORIDE 10 MG: 10 INJECTION INTRAMUSCULAR; INTRAVENOUS at 16:08

## 2024-08-18 RX ADMIN — SODIUM CHLORIDE, POTASSIUM CHLORIDE, SODIUM LACTATE AND CALCIUM CHLORIDE: 600; 310; 30; 20 INJECTION, SOLUTION INTRAVENOUS at 15:01

## 2024-08-18 RX ADMIN — CEFAZOLIN 1 G: 1 INJECTION, POWDER, FOR SOLUTION INTRAMUSCULAR; INTRAVENOUS at 22:48

## 2024-08-18 RX ADMIN — PHENYLEPHRINE HYDROCHLORIDE 100 MCG: 10 INJECTION INTRAVENOUS at 16:14

## 2024-08-18 RX ADMIN — SODIUM CHLORIDE, POTASSIUM CHLORIDE, SODIUM LACTATE AND CALCIUM CHLORIDE: 600; 310; 30; 20 INJECTION, SOLUTION INTRAVENOUS at 18:24

## 2024-08-18 RX ADMIN — KETAMINE HYDROCHLORIDE 10 MG: 10 INJECTION INTRAMUSCULAR; INTRAVENOUS at 16:31

## 2024-08-18 RX ADMIN — HYDROMORPHONE HYDROCHLORIDE 0.2 MG: 0.2 INJECTION, SOLUTION INTRAMUSCULAR; INTRAVENOUS; SUBCUTANEOUS at 08:58

## 2024-08-18 RX ADMIN — PROPOFOL 150 MG: 10 INJECTION, EMULSION INTRAVENOUS at 15:26

## 2024-08-18 RX ADMIN — KETAMINE HYDROCHLORIDE 20 MG: 10 INJECTION INTRAMUSCULAR; INTRAVENOUS at 15:49

## 2024-08-18 RX ADMIN — ACETAMINOPHEN 975 MG: 325 TABLET, FILM COATED ORAL at 19:33

## 2024-08-18 RX ADMIN — FENTANYL CITRATE 100 MCG: 50 INJECTION INTRAMUSCULAR; INTRAVENOUS at 15:26

## 2024-08-18 RX ADMIN — BUPROPION HYDROCHLORIDE 150 MG: 150 TABLET, FILM COATED, EXTENDED RELEASE ORAL at 08:36

## 2024-08-18 RX ADMIN — Medication 15 ML: at 02:25

## 2024-08-18 RX ADMIN — CARBOXYMETHYLCELLULOSE SODIUM 1 DROP: 0.5 SOLUTION/ DROPS OPHTHALMIC at 19:37

## 2024-08-18 RX ADMIN — ROPIVACAINE HYDROCHLORIDE 10 ML: 5 INJECTION, SOLUTION EPIDURAL; INFILTRATION; PERINEURAL at 00:01

## 2024-08-18 RX ADMIN — DEXAMETHASONE SODIUM PHOSPHATE 4 MG: 4 INJECTION, SOLUTION INTRA-ARTICULAR; INTRALESIONAL; INTRAMUSCULAR; INTRAVENOUS; SOFT TISSUE at 16:34

## 2024-08-18 RX ADMIN — NITROFURANTOIN (MONOHYDRATE/MACROCRYSTALS) 100 MG: 75; 25 CAPSULE ORAL at 08:33

## 2024-08-18 RX ADMIN — HYDROMORPHONE HYDROCHLORIDE 0.2 MG: 0.2 INJECTION, SOLUTION INTRAMUSCULAR; INTRAVENOUS; SUBCUTANEOUS at 13:01

## 2024-08-18 RX ADMIN — ONDANSETRON 4 MG: 2 INJECTION INTRAMUSCULAR; INTRAVENOUS at 15:01

## 2024-08-18 RX ADMIN — ROPIVACAINE HYDROCHLORIDE 10 ML: 5 INJECTION, SOLUTION EPIDURAL; INFILTRATION; PERINEURAL at 02:03

## 2024-08-18 RX ADMIN — PANTOPRAZOLE SODIUM 40 MG: 40 TABLET, DELAYED RELEASE ORAL at 08:33

## 2024-08-18 RX ADMIN — HYDROMORPHONE HYDROCHLORIDE 0.2 MG: 0.2 INJECTION, SOLUTION INTRAMUSCULAR; INTRAVENOUS; SUBCUTANEOUS at 18:18

## 2024-08-18 ASSESSMENT — ACTIVITIES OF DAILY LIVING (ADL)
ADLS_ACUITY_SCORE: 25
ADLS_ACUITY_SCORE: 35
ADLS_ACUITY_SCORE: 25
ADLS_ACUITY_SCORE: 26
ADLS_ACUITY_SCORE: 26
ADLS_ACUITY_SCORE: 25
ADLS_ACUITY_SCORE: 28
ADLS_ACUITY_SCORE: 25
ADLS_ACUITY_SCORE: 26
ADLS_ACUITY_SCORE: 26
ADLS_ACUITY_SCORE: 25
ADLS_ACUITY_SCORE: 22
ADLS_ACUITY_SCORE: 25

## 2024-08-18 NOTE — ANESTHESIA POSTPROCEDURE EVALUATION
Patient: Jeanna Smith    Procedure: * No procedures listed *       Anesthesia Type:  Peripheral Nerve Block    Note:  Disposition: Inpatient   Postop Pain Control: Uneventful            Sign Out: Well controlled pain   PONV: No   Neuro/Psych: Uneventful            Sign Out: Acceptable/Baseline neuro status   Airway/Respiratory: Uneventful            Sign Out: Acceptable/Baseline resp. status   CV/Hemodynamics: Uneventful            Sign Out: Acceptable CV status; No obvious hypovolemia; No obvious fluid overload   Other NRE: NONE   DID A NON-ROUTINE EVENT OCCUR? No    Event details/Postop Comments:  Pain improved, the patient was able to partially sit up without discomfort.            Last vitals:  Vitals:    08/18/24 0100 08/18/24 0200 08/18/24 0213   BP: (!) 158/80 (!) 141/83 134/80   Pulse: 84 83    Resp: 17 18    Temp: 36.4  C (97.5  F) 36.4  C (97.6  F)    SpO2: 97% 96% 91%       Electronically Signed By: ARLENE Shay CRNA  August 18, 2024  2:17 AM

## 2024-08-18 NOTE — ANESTHESIA PROCEDURE NOTES
"Intrathecal injection Procedure Note    Pre-Procedure   Staff -        CRNA: Mustapha Morales APRN CRNA       Performed By: CRNA       Location: OR       Procedure Start/Stop Times: 8/18/2024 3:10 PM and 8/18/2024 3:21 PM       Pre-Anesthestic Checklist: patient identified, IV checked, risks and benefits discussed, informed consent, monitors and equipment checked, pre-op evaluation, at physician/surgeon's request and post-op pain management  Timeout:       Correct Patient: Yes        Correct Procedure: Yes        Correct Site: Yes        Correct Position: Yes   Procedure Documentation  Procedure: intrathecal injection       Patient Position: RLD       Skin prep: Chloraprep       Insertion Site: L4-5. (midline approach).       # of attempts: 2 and  # of redirects:  2  Medication(s) Administered   Medication Administration Time: 8/18/2024 3:10 PM     Comments:  Unable to achieve reasonable positioning due to patient pain and overall intolerance. Attempted at appropriate level, however, only bone felt on redirects, 1 other level tried, same result. Patient tolerant, but decision to go to sleep.VSS      FOR [facility] ([facility]) ONLY:   Pain Team Contact information: please page the Pain Team Via [employer]. Search \"Pain\". During daytime hours, please page the attending first. At night please page the resident first.      "

## 2024-08-18 NOTE — ANESTHESIA PROCEDURE NOTES
Airway       Patient location during procedure: OR       Procedure Start/Stop Times: 8/18/2024 3:27 PM and 8/18/2024 3:29 PM  Staff -        CRNA: Mustapha Morales APRN CRNA       Performed By: CRNA  Consent for Airway        Urgency: elective  Indications and Patient Condition       Indications for airway management: viet-procedural and airway protection         Mask difficulty assessment: 1 - vent by mask    Final Airway Details       Final airway type: endotracheal airway       Successful airway: ETT - single  Endotracheal Airway Details        ETT size (mm): 7.0       Cuffed: yes       Successful intubation technique: video laryngoscopy       VL Blade Size: Calloway 3       Grade View of Cords: 1       Adjucts: stylet       Position: Center       Measured from: gums/teeth       Secured at (cm): 21       Bite block used: None    Post intubation assessment        Placement verified by: capnometry        Number of attempts at approach: 1       Secured with: plastic tape       Ease of procedure: easy       Dentition: Intact    Medication(s) Administered   Medication Administration Time: 8/18/2024 3:27 PM

## 2024-08-18 NOTE — ANESTHESIA PREPROCEDURE EVALUATION
Anesthesia Pre-Procedure Evaluation    Patient: Jeanna Smith   MRN: [id number] : 1945        Procedure :           Past Medical History:   Diagnosis Date    Breast cancer (H)     2008 left     Breast cyst     Hypothyroidism       Past Surgical History:   Procedure Laterality Date    BIOPSY BREAST Left 2008    BREAST CYST ASPIRATION Left 10/2008    GASTRIC BYPASS  2004    HYSTERECTOMY      LUMPECTOMY BREAST Left 2008    OOPHORECTOMY      RECTOCELE REPAIR  ,    TONSILLECTOMY      [facility] ANESTH,EXCIS RETROPHARYNG TUMOR  ,,    fatty tumors removed    [facility] OOPH W/RADIC DISSECT FOR DEBULKING      [facility] VAGINAL HYSTERECTOMY  -      Allergies   Allergen Reactions    Darvocet [Acetaminophen]     Erythromycin Nausea    Pcn [Penicillins]       Social History     Tobacco Use    Smoking status: Not on file    Smokeless tobacco: Not on file   Substance Use Topics    Alcohol use: No      Wt Readings from Last 1 Encounters:   24 63.5 kg (140 lb)        Anesthesia Evaluation   Pt has had prior anesthetic. Type: General and MAC.        ROS/MED HX  ENT/Pulmonary:  - neg pulmonary ROS     Neurologic:  - neg neurologic ROS     Cardiovascular:     (+)  - -   -  - -             fainting (syncope).                         METS/Exercise Tolerance:     Hematologic:  - neg hematologic  ROS     Musculoskeletal: Comment: Closed displaced fracture of the left femoral neck  (+)  arthritis,             GI/Hepatic: Comment: Gastric bypass surgery in past      Renal/Genitourinary:  - neg Renal ROS     Endo:     (+)          thyroid problem, hypothyroidism,           Psychiatric/Substance Use:  - neg psychiatric ROS     Infectious Disease:       Malignancy:   (+) Malignancy, History of Breast.Breast CA status post Surgery.      Other:  - neg other ROS          Physical Exam    Airway  airway exam normal           Respiratory Devices and Support         Dental           Cardiovascular   cardiovascular  "exam normal          Pulmonary   pulmonary exam normal                OUTSIDE LABS:  CBC:   Lab Results   Component Value Date    WBC 8.7 08/17/2024    WBC 9.7 08/16/2024    HGB 9.9 (L) 08/17/2024    HGB 9.8 (L) 08/16/2024    HCT 29.9 (L) 08/17/2024    HCT 29.9 (L) 08/16/2024     08/17/2024     08/16/2024     BMP:   Lab Results   Component Value Date     (L) 08/17/2024     08/16/2024    POTASSIUM 4.0 08/17/2024    POTASSIUM 2.9 (L) 08/16/2024    CHLORIDE 101 08/17/2024    CHLORIDE 104 08/16/2024    CO2 25 08/17/2024    CO2 23 08/16/2024    BUN 12.7 08/17/2024    BUN 13.6 08/16/2024    CR 0.98 (H) 08/17/2024    CR 0.82 08/16/2024     (H) 08/17/2024     (H) 08/16/2024     COAGS: No results found for: \"PTT\", \"INR\", \"FIBR\"  POC: No results found for: \"BGM\", \"HCG\", \"HCGS\"  HEPATIC:   Lab Results   Component Value Date    ALBUMIN 3.2 (L) 08/17/2024    PROTTOTAL 5.3 (L) 08/17/2024    ALT 20 08/17/2024    AST 22 08/17/2024    ALKPHOS 73 08/17/2024    BILITOTAL 0.4 08/17/2024     OTHER:   Lab Results   Component Value Date    CHANNING 8.5 (L) 08/17/2024    PHOS 4.1 06/05/2007    MAG 2.2 06/05/2007    TSH 1.56 07/24/2024    CRP 0.43 05/02/2005       Anesthesia Plan    ASA Status:  4, emergent    - Procedure: Procedure only, no anesthetic delivered      Anesthesia Type: Peripheral Nerve Block.              Consents    Anesthesia Plan(s) and associated risks, benefits, and realistic alternatives discussed. Questions answered and patient/representative(s) expressed understanding.     - Discussed: Risks, Benefits and Alternatives for the PROCEDURE were discussed     - Discussed with:  Patient            Postoperative Care    Pain management: Peripheral nerve block (Single Shot).        Comments:               ARLENE Shay CRNA    I have reviewed the pertinent notes and labs in the chart from the past 30 days and (re)examined the patient.  Any updates or changes from those notes are " reflected in this note.    # Hypokalemia: Lowest K = 2.9 mmol/L in last 2 days, will replace as needed  # Hyponatremia: Lowest Na = 134 mmol/L in last 30 days, will monitor as appropriate      # Hypoalbuminemia: Lowest albumin = 3.1 g/dL at 8/16/2024  6:16 PM, will monitor as appropriate

## 2024-08-18 NOTE — ANESTHESIA PROCEDURE NOTES
"PENG Procedure Note    Pre-Procedure   Staff -        CRNA: Flash Vang APRN CRNA       Performed By: CRNA       Location: ED       Procedure Start/Stop Times: 8/18/2024 11:53 AM and 8/18/2024 12:06 AM       Pre-Anesthestic Checklist: patient identified, IV checked, site marked, risks and benefits discussed, informed consent, monitors and equipment checked, pre-op evaluation, at physician/surgeon's request and post-op pain management  Timeout:       Correct Patient: Yes        Correct Procedure: Yes        Correct Site: Yes        Correct Position: Yes        Correct Laterality: Yes        Site Marked: Yes  Procedure Documentation  Procedure: PENG       Laterality: left       Patient Position: supine       Patient Prep/Sterile Barriers: mask       Skin prep: Chloraprep       Local skin infiltrated with mL of 2% lidocaine.        Needle Type: insulated       Needle Gauge: 21.        Needle Length (millimeters): 100        Ultrasound guided       1. Ultrasound was used to identify targeted nerve, plexus, vascular marker, or fascial plane and place a needle adjacent to it in real-time.       2. Ultrasound was used to visualize the spread of anesthetic in close proximity to the above referenced structure.       3. A permanent image is entered into the patient's record.       4. The visualized anatomic structures appeared normal.       5. There were no apparent abnormal pathologic findings.    Assessment/Narrative         The placement was negative for: blood aspirated, painful injection and site bleeding       Paresthesias: No.       Bolus given via needle..        Secured via.        Insertion/Infusion Method: Single Shot       Complications: none    Medication(s) Administered   Medication Administration Time: 8/18/2024 11:53 AM      FOR [facility] ([facility] ONLY:   Pain Team Contact information: please page the Pain Team Via [employer]. Search \"Pain\". During daytime hours, please page the attending first. At " night please page the resident first.

## 2024-08-18 NOTE — BRIEF OP NOTE
[facility] Brief Operative Note    Pre-operative diagnosis: Closed displaced fracture of left femoral neck (H) left [S72.002A]   Post-operative diagnosis same   Procedure: Procedure(s):  HEMIARTHROPLASTY, HIP, BIPOLAR, left   Surgeon: Rashard Barajas MD   Assistants(s): Loi Downey PA-C   Estimated blood loss: Less than 50 ml    Specimens: None   Findings: DJD    Plan:  wbat  Possible tcu;  UTI, needs oral nitrofurantin at d/c  Oxy  Asa 81mg bid x 6 wk  F/u [facility] care team 10-14 days, patient has contact.

## 2024-08-18 NOTE — OP NOTE
Rashard Barajas MD     OPERATIVE NOTE    Name:  Jeanna Smith  PCP:  Jung Pacheco  Procedure Date: 8/18/2024      Pre-Procedure Diagnosis:  Closed displaced fracture of left femoral neck (H) [S72.002A]     Post-Procedure Diagnosis:    Same    Procedure:  Procedure(s):  HEMIARTHROPLASTY, HIP, BIPOLAR , left    Surgeon(s):  Rashard Barajas MD Nathan Dumm PA-C    Circulator: Araseli Hansen RN  Scrub Person: Anika Ferreira   A PAC was necessary to ensure safety of this patient and adequate progression of the procedure. This assist was needed for positioning, retracting of soft tissues, helping with placement of components, closure of the incision and application of the dressings.      Procedure and Findings:  After being informed of risks, benefits, alternatives to the procedure, patient desired to proceed, brought to the operating suite where they were placed under general anesthetic after attempts at a spinal.  Patient received 2 grams of intravenous Ancef.  Physician Assistant: was present for the entire length of the case for the purposes of proper patient positioning, surgical exposure, and patient safety.  A time-out verification step was completed.       The patient was then placed in the lateral decubitus position, on the bean bag, with the left hip up and the axillary roll placed and all pressure points padded, including the lateral knee.  The bean bag was elevated and the left LE was prepped and draped.   After the time out, and with the help of my PA a posterior lateral incision, 7 cm in length was created, based over the greater trochanter down through to the gluteal fascia.  I then used a #10 blade to split the fascia of the gluteus over the greater trochanter and 7 cm proximal into the muscle and the hip was abducted and the east west retractor was placed by my PA and then the hip was internally rotated to then access the posterior capsule and the short external rotators.   These were incised at their insertion and tagged and then the capsule was split.  The small sharron under the greater trochanter and gluteus medius and the small jessie over the quadrutus then allowed me to use the saw to freshen up the neck cut so that I could have access with the cork screw to remove the fractured femoral head.  This was done as my PA rotated the hip and femur internally.  The ball was then extracted and measured outer diameter of 48mm.  The cup was evaluated and no arthritis was noted.  The capsule was tagged.  Next, my PA used the flat long femoral neck retractor and a small jessie about the lesser trochanter and internally rotated the hip to expose the canal.  A cookie cutter was used to gain acces and then hand held reamer and then I broached up to a size 4 depuy cemented stem, due to her age and some amount of cortical thinning proximally making a cemented stem optimal.  25 degrees of anteversion, following her own native anteversion were planned.  I then trialed with the size 4 stem and the 48mm bipolar head and found excellent stability, reproducing her native leg length and tolerating flexion to 90 degrees and internal rotation to 90 degrees and full extension and external rotation showing no tendency for anterior dislocation.  Those components were chosen.  I then removed the trial and placed a cement restrictor and irrigated the canal with pulsitile lavage and then used simplex low viscosity cement, 2 doses in the cement gun, pressurizing this and then placing the stem and holding it in the 25 degrees of anteversion.  After the cement cured, I then placed the bipolar head, 48mm outer diameter and with my PA assist, we carefully reduced the hip and then repaired the capsule with 0 vicryl sutures and then placed the leg on a coleman stand and then closed the gluteal sling and fascia with 0 ethibond interrupted sutures, and then skin with 2-0 vicry and skin with running stratifix monocry.      A sterile dressing was applied, aquacel type. Ace wrap and dressings placed.  Patient tolerated the procedure well without complication and was extubated and returned to the PAR in stable condition.    Rashard Barajas MD

## 2024-08-18 NOTE — ANESTHESIA CARE TRANSFER NOTE
Patient: Jeanna Smith    Procedure: Procedure(s):  HEMIARTHROPLASTY, HIP, BIPOLAR       Diagnosis: Closed displaced fracture of left femoral neck (H) [S72.002A]  Diagnosis Additional Information: No value filed.    Anesthesia Type:   Spinal     Note:    Oropharynx: oropharynx clear of all foreign objects  Level of Consciousness: drowsy      Independent Airway: airway patency satisfactory and stable  Dentition: dentition unchanged  Vital Signs Stable: post-procedure vital signs reviewed and stable  Report to RN Given: handoff report given  Patient transferred to: PACU    Handoff Report: Identifed the Patient, Identified the Reponsible Provider, Reviewed the pertinent medical history, Discussed the surgical course, Reviewed Intra-OP anesthesia mangement and issues during anesthesia, Set expectations for post-procedure period and Allowed opportunity for questions and acknowledgement of understanding      Vitals:  Vitals Value Taken Time   /81 08/18/24 1745   Temp 36.2  C (97.1  F) 08/18/24 1708   Pulse 68 08/18/24 1749   Resp 18 08/18/24 1749   SpO2 94 % 08/18/24 1749   Vitals shown include unfiled device data.    Electronically Signed By: ARLENE Brandon CRNA  August 18, 2024  6:49 PM

## 2024-08-18 NOTE — ED NOTES
Call from radiology that is in too much pain to do exam, advised to bring back to get pain control, MD aware pain medication requested

## 2024-08-18 NOTE — ANESTHESIA POSTPROCEDURE EVALUATION
Patient: Jeanna Smith    Procedure: * No procedures listed *       Anesthesia Type:  Peripheral Nerve Block    Note:  Disposition: Inpatient   Postop Pain Control: Uneventful            Sign Out: Well controlled pain (patient states improving)   PONV: No   Neuro/Psych: Uneventful            Sign Out: Acceptable/Baseline neuro status   Airway/Respiratory: Uneventful            Sign Out: Acceptable/Baseline resp. status   CV/Hemodynamics: Uneventful            Sign Out: Acceptable CV status; No obvious hypovolemia; No obvious fluid overload   Other NRE: NONE   DID A NON-ROUTINE EVENT OCCUR? No           Last vitals:  Vitals:    08/17/24 2315 08/17/24 2330 08/17/24 2345   BP:  (!) 143/72    Pulse:  78    Resp:      Temp:      SpO2: 96% 96% 97%       Electronically Signed By: ARLENE Shay CRNA  August 18, 2024  12:19 AM

## 2024-08-18 NOTE — ED NOTES
Back to xray to complete exams, more relaxed and sleepy from medication, saturations to low 80s, oxygen applied

## 2024-08-18 NOTE — ANESTHESIA POSTPROCEDURE EVALUATION
Patient: Jeanna Smith    Procedure: Procedure(s):  HEMIARTHROPLASTY, HIP, BIPOLAR       Anesthesia Type:  Spinal    Note:  Disposition: Outpatient   Postop Pain Control: Uneventful            Sign Out: Well controlled pain   PONV: No   Neuro/Psych: Uneventful            Sign Out: Acceptable/Baseline neuro status   Airway/Respiratory: Uneventful            Sign Out: Acceptable/Baseline resp. status   CV/Hemodynamics: Uneventful            Sign Out: Acceptable CV status; No obvious hypovolemia; No obvious fluid overload   Other NRE:    DID A NON-ROUTINE EVENT OCCUR?            Last vitals:  Vitals Value Taken Time   /81 08/18/24 1745   Temp 36.2  C (97.1  F) 08/18/24 1708   Pulse 68 08/18/24 1749   Resp 18 08/18/24 1749   SpO2 94 % 08/18/24 1749   Vitals shown include unfiled device data.    Electronically Signed By: ARLENE Brandon CRNA  August 18, 2024  6:49 PM

## 2024-08-18 NOTE — MEDICATION SCRIBE - ADMISSION MEDICATION HISTORY
Medication Scribe Admission Medication History    Admission medication history is complete. The information provided in this note is only as accurate as the sources available at the time of the update.    Information Source(s): Patient via in-person    Pertinent Information:     Changes made to PTA medication list:  Added: bupropion, vit d, aleve, thera tears eye drops,   Deleted: ca/vit d, multi vit  Changed: None    Allergies reviewed with patient and updates made in EHR: yes    Medication History Completed By: Leighann Washington 8/17/2024 9:29 PM    PTA Med List   Medication Sig Last Dose    ARMOUR THYROID 60 MG OR TABS 1 TABLET DAILY 8/17/2024 at am    buPROPion (WELLBUTRIN XL) 150 MG 24 hr tablet Take 1 tablet by mouth daily 8/17/2024 at am    carboxymethylcellulose PF (REFRESH LIQUIGEL) 1 % ophthalmic gel Place 1 drop into both eyes 2 times daily 8/17/2024 at am    IRON PO Take 1 tablet by mouth every evening 8/17/2024 at am    naproxen sodium 220 MG capsule Take 440 mg by mouth as needed 8/16/2024 at am    Omega-3 Fatty Acids (OMEGA-3 PO) Take 1 capsule by mouth daily 8/17/2024 at am    pantoprazole (PROTONIX) 40 MG EC tablet Take 40 mg by mouth daily 8/17/2024 at am    potassium chloride gurmeet ER (KLOR-CON M20) 20 MEQ CR tablet Take 1 tablet (20 mEq) by mouth 2 times daily for 3 days 8/17/2024 at am    sulfamethoxazole-trimethoprim (BACTRIM DS) 800-160 MG tablet Take 1 tablet by mouth 2 times daily for 7 days 8/17/2024 at am    vitamin B-Complex Take 1 tablet by mouth daily 8/16/2024 at am    VITAMIN D PO Take by mouth daily 8/17/2024 at am

## 2024-08-18 NOTE — ED PROVIDER NOTES
History     Chief Complaint   Patient presents with    Fall    Hip Pain     HPI  Jeanna Smith is a 79 year old female with past medical history significant for recently diagnosed UTI yesterday hypothyroidism hematoma syncope and history of breast cancer who presents emergency department complaining of left hip pain status post fall.  Patient states she had just been seen yesterday for diarrhea and was treated for UTI.  This afternoon she was using a walker and felt weak and lightheaded and lost control of her walker and fell on her left hip she also hit her head has some neck pain.  She denies any chest pain shortness of breath or abdominal pain she has significant pain in the lateral aspect of her left hip.  She is able to wiggle her toes denies any pain in her extremities other than these areas.    Allergies:  Allergies   Allergen Reactions    Darvocet [Acetaminophen]     Erythromycin Nausea    Pcn [Penicillins]        Problem List:    Patient Active Problem List    Diagnosis Date Noted    Hematoma 10/31/2018     Priority: Medium    Syncope 10/30/2018     Priority: Medium    CARDIOVASCULAR SCREENING; LDL GOAL LESS THAN 160 10/31/2010     Priority: Medium    Contact dermatitis of eyelid 11/07/2009     Priority: Medium    UTI (urinary tract infection) 11/07/2009     Priority: Medium    Hypothyroidism 11/07/2009     Priority: Medium        Past Medical History:    Past Medical History:   Diagnosis Date    Breast cancer (H)     Breast cyst     Hypothyroidism        Past Surgical History:    Past Surgical History:   Procedure Laterality Date    BIOPSY BREAST Left 2008    BREAST CYST ASPIRATION Left 10/2008    GASTRIC BYPASS  2004    HYSTERECTOMY      LUMPECTOMY BREAST Left 2008    OOPHORECTOMY      RECTOCELE REPAIR  1990,1992    TONSILLECTOMY  1955    [facility] ANESTH,EXCIS RETROPHARYNG TUMOR  2005,2006,2008    fatty tumors removed    [facility] OOPH W/RADIC DISSECT FOR DEBULKING  1993    [facility] VAGINAL HYSTERECTOMY  1988-89        Family History:    Family History   Problem Relation Age of Onset    Arthritis Mother     Heart Disease Mother         TIA's    Respiratory Father         emphysema    No Known Problems Sister     No Known Problems Daughter     No Known Problems Maternal Grandmother     No Known Problems Maternal Grandfather     No Known Problems Paternal Grandmother     No Known Problems Paternal Grandfather     No Known Problems Maternal Aunt     No Known Problems Paternal Aunt     Hereditary Breast and Ovarian Cancer Syndrome No family hx of     Breast Cancer No family hx of     Cancer No family hx of     Colon Cancer No family hx of     Endometrial Cancer No family hx of     Ovarian Cancer No family hx of        Social History:  Marital Status:   [5]  Social History     Substance Use Topics    Alcohol use: No    Drug use: No        Medications:    ARMOUR THYROID 60 MG OR TABS  Calcium Citrate-Vitamin D (CALCIUM + D PO)  IRON PO  MULTIVITAMIN TABS   OR  Omega-3 Fatty Acids (OMEGA-3 PO)  PANTOPRAZOLE SODIUM PO  potassium chloride gurmeet ER (KLOR-CON M20) 20 MEQ CR tablet  sulfamethoxazole-trimethoprim (BACTRIM DS) 800-160 MG tablet  vitamin B-Complex          Review of Systems  Per HPI.  Physical Exam   BP: 138/68  Pulse: 76  Temp: 97.2  F (36.2  C)  Resp: 14  Weight: 63.5 kg (140 lb)  SpO2: 92 %      Physical Exam  Vitals and nursing note reviewed.   Constitutional:       Appearance: Normal appearance. She is not ill-appearing, toxic-appearing or diaphoretic.      Comments: Stress secondary to hip pain.   HENT:      Head: Normocephalic and atraumatic.      Ears:      Comments: Midface instability.     Nose:      Comments: Superficial laceration well aligned on bridge of nose.     Mouth/Throat:      Mouth: Mucous membranes are moist.      Pharynx: Oropharynx is clear.   Eyes:      Conjunctiva/sclera: Conjunctivae normal.      Comments: There is no jaw tenderness.   Neck:      Comments: Bilateral posterior lateral neck  tenderness.  Cardiovascular:      Rate and Rhythm: Normal rate and regular rhythm.      Pulses: Normal pulses.      Heart sounds: Normal heart sounds. No murmur heard.  Pulmonary:      Effort: Pulmonary effort is normal.      Breath sounds: Normal breath sounds. No stridor. No wheezing or rhonchi.   Abdominal:      General: Abdomen is flat. Bowel sounds are normal. There is no distension.      Palpations: Abdomen is soft.      Tenderness: There is no abdominal tenderness. There is no right CVA tenderness or left CVA tenderness.   Musculoskeletal:      Cervical back: Normal range of motion and neck supple.      Comments: Mild tenderness to palpation of the lumbar spine.  Patient has significant tenderness to the anterior and lateral aspect of the left hip.  Was slight shortening of the left leg compared to right leg pain with any rotational movements of the left leg no thigh pain knee pain calf pain on left pulses sensation symmetrical patient is able to wiggle her toes without difficulty has sensation in the toes.   Skin:     General: Skin is warm and dry.      Capillary Refill: Capillary refill takes less than 2 seconds.      Findings: No rash.   Neurological:      General: No focal deficit present.      Mental Status: She is alert and oriented to person, place, and time.      Cranial Nerves: No cranial nerve deficit.      Sensory: No sensory deficit.   Psychiatric:         Mood and Affect: Mood normal.         ED Course        Procedures              Critical Care time:  none               Results for orders placed or performed during the hospital encounter of 08/17/24 (from the past 24 hour(s))   CBC with platelets differential    Narrative    The following orders were created for panel order CBC with platelets differential.  Procedure                               Abnormality         Status                     ---------                               -----------         ------                     CBC with  platelets and d...[[id number]]  Abnormal            Final result                 Please view results for these tests on the individual orders.   Comprehensive metabolic panel   Result Value Ref Range    Sodium 134 (L) 135 - 145 mmol/L    Potassium 4.0 3.4 - 5.3 mmol/L    Carbon Dioxide (CO2) 25 22 - 29 mmol/L    Anion Gap 8 7 - 15 mmol/L    Urea Nitrogen 12.7 8.0 - 23.0 mg/dL    Creatinine 0.98 (H) 0.51 - 0.95 mg/dL    GFR Estimate 58 (L) >60 mL/min/1.73m2    Calcium 8.5 (L) 8.8 - 10.4 mg/dL    Chloride 101 98 - 107 mmol/L    Glucose 154 (H) 70 - 99 mg/dL    Alkaline Phosphatase 73 40 - 150 U/L    AST 22 0 - 45 U/L    ALT 20 0 - 50 U/L    Protein Total 5.3 (L) 6.4 - 8.3 g/dL    Albumin 3.2 (L) 3.5 - 5.2 g/dL    Bilirubin Total 0.4 <=1.2 mg/dL   CBC with platelets and differential   Result Value Ref Range    WBC Count 8.7 4.0 - 11.0 10e3/uL    RBC Count 3.52 (L) 3.80 - 5.20 10e6/uL    Hemoglobin 9.9 (L) 11.7 - 15.7 g/dL    Hematocrit 29.9 (L) 35.0 - 47.0 %    MCV 85 78 - 100 fL    MCH 28.1 26.5 - 33.0 pg    MCHC 33.1 31.5 - 36.5 g/dL    RDW 14.4 10.0 - 15.0 %    Platelet Count 173 150 - 450 10e3/uL    % Neutrophils 91 %    % Lymphocytes 3 %    % Monocytes 6 %    % Eosinophils 0 %    % Basophils 0 %    % Immature Granulocytes 0 %    NRBCs per 100 WBC 0 <1 /100    Absolute Neutrophils 7.9 1.6 - 8.3 10e3/uL    Absolute Lymphocytes 0.3 (L) 0.8 - 5.3 10e3/uL    Absolute Monocytes 0.5 0.0 - 1.3 10e3/uL    Absolute Eosinophils 0.0 0.0 - 0.7 10e3/uL    Absolute Basophils 0.0 0.0 - 0.2 10e3/uL    Absolute Immature Granulocytes 0.0 <=0.4 10e3/uL    Absolute NRBCs 0.0 10e3/uL   CT Head w/o Contrast    Narrative    EXAM: CT HEAD W/O CONTRAST  LOCATION: [facility]  DATE: 8/17/2024    INDICATION: Closed head injury status post fall.  COMPARISON: None.  TECHNIQUE: Routine CT Head without IV contrast. Multiplanar reformats. Dose reduction techniques were used.    FINDINGS:  INTRACRANIAL CONTENTS: No  intracranial hemorrhage, extraaxial collection, or mass effect.  No CT evidence of acute infarct. Mild presumed chronic small vessel ischemic changes. Mild generalized volume loss. No hydrocephalus.     VISUALIZED ORBITS/SINUSES/MASTOIDS: No intraorbital abnormality. No paranasal sinus mucosal disease. No middle ear or mastoid effusion.    BONES/SOFT TISSUES: No acute abnormality.      Impression    IMPRESSION:  1.  No acute intracranial findings.   CT Cervical Spine w/o Contrast    Narrative    EXAM: CT CERVICAL SPINE W/O CONTRAST  LOCATION: [facility]  DATE: 8/17/2024    INDICATION: Neck pain s p fall  COMPARISON: None.  TECHNIQUE: Routine CT Cervical Spine without IV contrast. Multiplanar reformats. Dose reduction techniques were used.    FINDINGS:  VERTEBRA: Normal vertebral body heights. No fracture or posttraumatic subluxation. Minor anterolisthesis C4-C5 and C7-T1, minor retrolisthesis C5-C6.    CANAL/FORAMINA: At C3-4, severe bilateral foraminal stenosis. At C5-6, moderate central stenosis, severe right foraminal stenosis.    PARASPINAL: No extraspinal abnormality.      Impression    IMPRESSION:  1.  No fracture or posttraumatic subluxation.     XR Femur Left 2 Views    Narrative    EXAM: XR FEMUR LEFT 2 VIEWS  LOCATION: [facility]  DATE: 8/17/2024    INDICATION: Left thigh pain status post fall  COMPARISON: None.      Impression    IMPRESSION: Fracture through the left femoral neck without evidence for intertrochanteric extension. No dislocation. Left femur otherwise negative.   XR Pelvis 1/2 Views    Narrative    EXAM: XR PELVIS 1/2 VIEWS  LOCATION: [facility]  DATE: 8/17/2024    INDICATION: Left hip pain status post fall  COMPARISON: None.      Impression    IMPRESSION: Fracture of the left femoral neck without evidence for intertrochanteric extension. Right hip negative for fracture. Degenerative change both hips. Pelvis  negative for fracture.   Lumbar spine XR, 2-3 views    Narrative    EXAM: XR LUMBAR SPINE 2/3 VIEWS  LOCATION: [facility]  DATE/TIME: 8/17/2024 9:36 PM CDT    INDICATION: Lower back pain.   COMPARISON: None.  TECHNIQUE: Radiographs of lumbar spine in routine projections.    FINDINGS:   Nomenclature based on 5 lumbar type vertebral bodies. Mild dextrocurvature L2-3, mild levocurvature L4. Normal sagittal alignment. Normal height of vertebral bodies. Moderate multi level interbody degeneration. Moderate lower lumbar facet arthropathy.   Unremarkable visualized bony pelvis. Abdominal soft tissues are unremarkable.                Medications   sodium chloride 0.9% BOLUS 500 mL (500 mLs Intravenous $New Bag 8/17/24 1915)   sodium chloride 0.9 % infusion (has no administration in time range)   HYDROmorphone (PF) (DILAUDID) injection 0.3 mg (0.3 mg Intravenous $Given 8/17/24 1915)   ondansetron (ZOFRAN) injection 4 mg (4 mg Intravenous $Given 8/17/24 1916)       Assessments & Plan (with Medical Decision Making) records reviewed including past medical history medications and allergies.  Recent visit yesterday for diarrhea and UTI was reviewed.  Office visit on 7/24/2024 was reviewed.  Due to patient's fall CT scan of the head and cervical spine were obtained x-ray of the lumbar spine and pelvis was also obtained.  Patient was given Zofran Dilaudid and IV fluids.  Labs were obtained I independently reviewed and interpreted labs.  Patient's white count was 8.7 hemoglobin 9.9 platelet count 173 there is no left shift.  Comprehensive metabolic panel significant for sodium 134 creatinine 0.98 glucose was 154.  Independently reviewed the imaging studies and agree with radiologist findings of CT scan of the head with no acute intracranial findings.  CT scan of cervical spine with no fractures or posttraumatic subluxation.  X-ray of the lumbar spine moderate degenerative changes no evidence of fracture.   This x-ray fracture of the left femoral neck without evidence of intertrochanteric extension.  Right hip negative.  Left femur x-ray unremarkable.  Patient was given further pain medication.  Anesthesia was contacted and when they get out of their surgical case will come down for hip block.  Case was discussed with family and patient and they are in agreement with admission.  I discussed the case with Dr. Aguilar who is in agreement with having patient admitted to the hospitalist service with probable procedure tomorrow.  Case was discussed with Dr. Levy was in agreement with admitting the patient for further evaluation and care.     I have reviewed the nursing notes.    I have reviewed the findings, diagnosis, plan and need for follow up with the patient.                   Final diagnoses:   Fall at home, initial encounter   Closed displaced fracture of left femoral neck (H)   H/O urinary tract infection       8/17/2024   [facility] EMERGENCY DEPT       Marcus Ceron MD  08/19/24 [id number]

## 2024-08-18 NOTE — PROGRESS NOTES
Patient c/o severe Left hip pain, CRNA called for Peripheral Nerve Block . Procedure completed with decrease in pain to 3/10.

## 2024-08-18 NOTE — CONSULTS
[facility]  Orthopaedics Consultation    Consultation   Level of consult: Consult, follow and place orders    Jeanna Smith,  1945, MRN [id number]     Admitting Dx: H/O urinary tract infection [Z87.440]  Fall at home, initial encounter [W19.XXXA, Y92.009]  Closed displaced fracture of left femoral neck (H) [S72.002A]     PCP: Jung Pacheco, [phone]     Code status:  Full Code     Extended Emergency Contact Information  Primary Emergency Contact: Anai Rodgers   [facility]  Home Phone: [phone]  Mobile Phone: [phone]  Relation: Daughter  Secondary Emergency Contact: Shasta Devries  Mobile Phone: [phone]  Relation: Daughter     Assessment:    Left displaced femoral neck fracture  Recent UTI, on IV abx    Plan:  Left hip hemiarthroplasty  NPO  Consent signed  Hopsitalist recommending Nitrofurantin oral abx for UTI. Will also use ancef two doses post op.  Possible TCU after hospital stay vs discharge to home.      Principal Problem:    Closed displaced fracture of left femoral neck (H)  Active Problems:    UTI (urinary tract infection)    Hypothyroidism    Fall at home, initial encounter    Anemia       Chief Complaint  Left hip pain after fall     HPI  We have been requested by ER doctor to evaluate Jeanna Smith who is a 79 year old year old female for left hip pain and inability to walk after she fell at home.  Struggling over the last 3 days with a UTI.  Arrive in ER Friday night, diagnosed with UTI, given oral abx.  Dizzy at home the following day, fell, and now back with hip fracture.  Otherwise very healthy.  Daughter present with patient.     History is obtained from the patient     Past Medical History  Past Medical History:   Diagnosis Date    Breast cancer (H)      left     Breast cyst     Hypothyroidism        Surgical History  Past Surgical History:   Procedure Laterality Date    BIOPSY BREAST Left 2008    BREAST CYST ASPIRATION Left 10/2008    GASTRIC BYPASS   2004    HYSTERECTOMY      LUMPECTOMY BREAST Left 2008    OOPHORECTOMY      RECTOCELE REPAIR  1990,1992    TONSILLECTOMY  1955    [facility] ANESTH,EXCIS RETROPHARYNG TUMOR  2005,2006,2008    fatty tumors removed    [facility] OOPH W/RADIC DISSECT FOR DEBULKING  1993    [facility] VAGINAL HYSTERECTOMY  1988-89        Social History  Social History     Socioeconomic History    Marital status:      Spouse name: Not on file    Number of children: Not on file    Years of education: Not on file    Highest education level: Not on file   Occupational History    Not on file   Tobacco Use    Smoking status: Not on file    Smokeless tobacco: Not on file   Substance and Sexual Activity    Alcohol use: No    Drug use: No    Sexual activity: Yes     Partners: Male   Other Topics Concern    Parent/sibling w/ CABG, MI or angioplasty before 65F 55M? Not Asked     Service No    Blood Transfusions No    Caffeine Concern Yes     Comment: 1 cup a coffee a day    Occupational Exposure No    Hobby Hazards No    Sleep Concern No    Stress Concern No    Weight Concern No    Special Diet No    Back Care No    Exercise Yes     Comment: little    Bike Helmet No    Seat Belt Yes    Self-Exams Yes   Social History Narrative    Not on file     Social Determinants of Health     Financial Resource Strain: Not on file   Food Insecurity: Not on file   Transportation Needs: Not on file   Physical Activity: Not on file   Stress: Not on file   Social Connections: Not on file   Interpersonal Safety: Not on file   Housing Stability: Not on file       Family History  Family History   Problem Relation Age of Onset    Arthritis Mother     Heart Disease Mother         TIA's    Respiratory Father         emphysema    No Known Problems Sister     No Known Problems Daughter     No Known Problems Maternal Grandmother     No Known Problems Maternal Grandfather     No Known Problems Paternal Grandmother     No Known Problems Paternal Grandfather     No Known Problems  Maternal Aunt     No Known Problems Paternal Aunt     Hereditary Breast and Ovarian Cancer Syndrome No family hx of     Breast Cancer No family hx of     Cancer No family hx of     Colon Cancer No family hx of     Endometrial Cancer No family hx of     Ovarian Cancer No family hx of         Allergies:  Darvocet [acetaminophen], Erythromycin, and Pcn [penicillins]      Current Medications:  Current Facility-Administered Medications   Medication Dose Route Frequency Provider Last Rate Last Admin    acetaminophen (TYLENOL) tablet 500-1,000 mg  500-1,000 mg Oral Q6H PRN Benja Lynn MD   1,000 mg at 08/18/24 1121    bismuth subsalicylate (PEPTO BISMOL) 262 mg/15 mL suspension 15 mL  262 mg Oral Q6H PRN Khushboo Burns MD   15 mL at 08/18/24 0225    buPROPion (WELLBUTRIN XL) 24 hr tablet 150 mg  150 mg Oral Daily Alma Levy MD   150 mg at 08/18/24 0836    calcium carbonate (TUMS) chewable tablet 1,000 mg  1,000 mg Oral 4x Daily PRN Alma Levy MD        carboxymethylcellulose PF (REFRESH PLUS) 0.5 % ophthalmic solution 1 drop  1 drop Both Eyes BID Alma Levy MD   1 drop at 08/18/24 0832    [Held by provider] ferrous sulfate (FEROSUL) tablet 325 mg  325 mg Oral QPM Alma Levy MD        HYDROmorphone (DILAUDID) injection 0.2 mg  0.2 mg Intravenous Q3H PRN Khushboo Burns MD   0.2 mg at 08/18/24 1301    lidocaine (LMX4) kit   Topical Q1H PRN Alma Levy MD        lidocaine 1 % 0.1-1 mL  0.1-1 mL Other Q1H PRN Alma Levy MD        naloxone (NARCAN) injection 0.2 mg  0.2 mg Intravenous Q2 Min PRN Tutu Newman MD        Or    naloxone (NARCAN) injection 0.4 mg  0.4 mg Intravenous Q2 Min PRN Tutu Newman MD        Or    naloxone (NARCAN) injection 0.2 mg  0.2 mg Intramuscular Q2 Min PRN Tutu Newman MD        Or    naloxone (NARCAN) injection 0.4 mg  0.4 mg Intramuscular Q2 Min PRN Tutu Newman MD        nitroFURantoin macrocrystal-monohydrate  "(MACROBID) capsule 100 mg  100 mg Oral Q12H [facility] (08/20) Alma Levy MD   100 mg at 08/18/24 0833    pantoprazole (PROTONIX) EC tablet 40 mg  40 mg Oral Daily Alma Levy MD   40 mg at 08/18/24 0833    senna-docusate (SENOKOT-S/PERICOLACE) 8.6-50 MG per tablet 1 tablet  1 tablet Oral BID PRN Alma Levy MD        Or    senna-docusate (SENOKOT-S/PERICOLACE) 8.6-50 MG per tablet 2 tablet  2 tablet Oral BID PRN Alma Levy MD        sodium chloride (PF) 0.9% PF flush 3 mL  3 mL Intracatheter Q8H Alma Levy MD   3 mL at 08/18/24 0850    sodium chloride (PF) 0.9% PF flush 3 mL  3 mL Intracatheter q1 min prn Alma Levy MD        sodium chloride 0.9 % infusion  1,000 mL Intravenous Continuous Marcus Ceron  mL/hr at 08/18/24 0838 1,000 mL at 08/18/24 0838    thyroid (ARMOUR) tablet 60 mg  60 mg Oral Daily Alma Levy MD           Review of Systems:  Recent UTI, now afebrile,  fatigue.     Physical Exam:  Temp:  [96.1  F (35.6  C)-97.6  F (36.4  C)] 96.1  F (35.6  C)  Pulse:  [61-84] 61  Resp:  [14-18] 16  BP: (117-158)/() 152/72  SpO2:  [83 %-100 %] 95 %    Alert and oriented.  Left hip painful with log rolling.  Held a bit in external rotation no leg swelling, no calf swelling.      Pertinent Labs  Lab Results: personally reviewed.  Lab Results   Component Value Date    WBC 7.9 08/18/2024    HGB 10.3 (L) 08/18/2024    HCT 31.4 (L) 08/18/2024    MCV 86 08/18/2024     08/18/2024     No results for input(s): \"INR\" in the last 168 hours.    Pertinent Radiology  Radiology Results: images and radiology report reviewed  Recent Results (from the past 24 hour(s))   CT Head w/o Contrast    Narrative    EXAM: CT HEAD W/O CONTRAST  LOCATION: [facility]  DATE: 8/17/2024    INDICATION: Closed head injury status post fall.  COMPARISON: None.  TECHNIQUE: Routine CT Head without IV contrast. Multiplanar reformats. Dose reduction techniques were " used.    FINDINGS:  INTRACRANIAL CONTENTS: No intracranial hemorrhage, extraaxial collection, or mass effect.  No CT evidence of acute infarct. Mild presumed chronic small vessel ischemic changes. Mild generalized volume loss. No hydrocephalus.     VISUALIZED ORBITS/SINUSES/MASTOIDS: No intraorbital abnormality. No paranasal sinus mucosal disease. No middle ear or mastoid effusion.    BONES/SOFT TISSUES: No acute abnormality.      Impression    IMPRESSION:  1.  No acute intracranial findings.   CT Cervical Spine w/o Contrast    Narrative    EXAM: CT CERVICAL SPINE W/O CONTRAST  LOCATION: [facility]  DATE: 8/17/2024    INDICATION: Neck pain s p fall  COMPARISON: None.  TECHNIQUE: Routine CT Cervical Spine without IV contrast. Multiplanar reformats. Dose reduction techniques were used.    FINDINGS:  VERTEBRA: Normal vertebral body heights. No fracture or posttraumatic subluxation. Minor anterolisthesis C4-C5 and C7-T1, minor retrolisthesis C5-C6.    CANAL/FORAMINA: At C3-4, severe bilateral foraminal stenosis. At C5-6, moderate central stenosis, severe right foraminal stenosis.    PARASPINAL: No extraspinal abnormality.      Impression    IMPRESSION:  1.  No fracture or posttraumatic subluxation.     XR Femur Left 2 Views    Narrative    EXAM: XR FEMUR LEFT 2 VIEWS  LOCATION: [facility]  DATE: 8/17/2024    INDICATION: Left thigh pain status post fall  COMPARISON: None.      Impression    IMPRESSION: Fracture through the left femoral neck without evidence for intertrochanteric extension. No dislocation. Left femur otherwise negative.   XR Pelvis 1/2 Views    Narrative    EXAM: XR PELVIS 1/2 VIEWS  LOCATION: [facility]  DATE: 8/17/2024    INDICATION: Left hip pain status post fall  COMPARISON: None.      Impression    IMPRESSION: Fracture of the left femoral neck without evidence for intertrochanteric extension. Right hip negative for  fracture. Degenerative change both hips. Pelvis negative for fracture.   Lumbar spine XR, 2-3 views    Narrative    EXAM: XR LUMBAR SPINE 2/3 VIEWS  LOCATION: [facility]  DATE/TIME: 8/17/2024 9:36 PM CDT    INDICATION: Lower back pain.   COMPARISON: None.  TECHNIQUE: Radiographs of lumbar spine in routine projections.    FINDINGS:   Nomenclature based on 5 lumbar type vertebral bodies. Mild dextrocurvature L2-3, mild levocurvature L4. Normal sagittal alignment. Normal height of vertebral bodies. Moderate multi level interbody degeneration. Moderate lower lumbar facet arthropathy.   Unremarkable visualized bony pelvis. Abdominal soft tissues are unremarkable.            POC US GUIDANCE NEEDLE PLACEMENT    Impression    Anatomy visualized well        Attestation:  I have reviewed today's vital signs, notes, medications, labs and imaging.     Rashard Barajas MD

## 2024-08-18 NOTE — ANESTHESIA PROCEDURE NOTES
"PENG Procedure Note    Pre-Procedure   Staff -        CRNA: Flash Vang APRN CRNA       Performed By: CRNA       Location: ICU       Procedure Start/Stop Times: 8/18/2024 2:01 AM and 8/18/2024 2:08 AM       Pre-Anesthestic Checklist: patient identified, IV checked, site marked, risks and benefits discussed, informed consent, monitors and equipment checked, pre-op evaluation, at physician/surgeon's request and post-op pain management  Timeout:       Correct Patient: Yes        Correct Procedure: Yes        Correct Site: Yes        Correct Position: Yes        Correct Laterality: Yes        Site Marked: Yes  Procedure Documentation  Procedure: PENG       Laterality: left       Patient Position: supine       Skin prep: Chloraprep       Local skin infiltrated with mL of 2% lidocaine.        Needle Type: insulated       Needle Gauge: 21.        Needle Length (millimeters): 100        Ultrasound guided       1. Ultrasound was used to identify targeted nerve, plexus, vascular marker, or fascial plane and place a needle adjacent to it in real-time.       2. Ultrasound was used to visualize the spread of anesthetic in close proximity to the above referenced structure.       3. A permanent image is entered into the patient's record.       4. The visualized anatomic structures appeared normal.       5. There were no apparent abnormal pathologic findings.    Assessment/Narrative         The placement was negative for: blood aspirated, painful injection and site bleeding       Paresthesias: No.       Bolus given via needle..        Secured via.        Insertion/Infusion Method: Single Shot       Complications: none    Medication(s) Administered   Medication Administration Time: 8/18/2024 2:01 AM     Comments:  10 ml added to peripheral nerve block       FOR [facility] ([facility]) ONLY:   Pain Team Contact information: please page the Pain Team Via Bl[employer] Messenger. Search \"Pain\". During daytime hours, please page the attending " first. At night please page the resident first.

## 2024-08-18 NOTE — ANESTHESIA CARE TRANSFER NOTE
Patient: Jeanna Smith    Procedure: * No procedures listed *       Diagnosis: * No pre-op diagnosis entered *  Diagnosis Additional Information: No value filed.    Anesthesia Type:   Peripheral Nerve Block     Note:    Oropharynx: oropharynx clear of all foreign objects  Level of Consciousness: awake  Oxygen Supplementation: room air    Independent Airway: airway patency satisfactory and stable  Dentition: dentition unchanged  Vital Signs Stable: post-procedure vital signs reviewed and stable  Report to RN Given: handoff report given  Patient transferred to: Emergency Department  Comments: See nurse charting for vital signs   Handoff Report: Identifed the Patient, Identified the Reponsible Provider, Reviewed the pertinent medical history, Discussed the surgical course, Reviewed Intra-OP anesthesia mangement and issues during anesthesia, Set expectations for post-procedure period and Allowed opportunity for questions and acknowledgement of understanding      Vitals:  Vitals Value Taken Time   BP     Temp     Pulse     Resp     SpO2 96 % 08/18/24 0017   Vitals shown include unfiled device data.    Electronically Signed By: ARLENE Shay CRNA  August 18, 2024  12:18 AM

## 2024-08-18 NOTE — ED NOTES
[facility]   Admission Handoff    The patient is Jeanna Smith, 79 year old who arrived in the ED by AMBULANCE from home with a complaint of Fall and Hip Pain  . The patient's current symptoms are new and during this time the symptoms have decreased. In the ED, patient was diagnosed with   Final diagnoses:   Fall at home, initial encounter   Closed displaced fracture of left femoral neck (H)   H/O urinary tract infection         Needed?: No    Allergies:    Allergies   Allergen Reactions    Darvocet [Acetaminophen]     Erythromycin Nausea    Pcn [Penicillins]        Past Medical Hx:   Past Medical History:   Diagnosis Date    Breast cancer (H)     2008 left     Breast cyst     Hypothyroidism        Initial vitals were: BP: 138/68  Pulse: 76  Temp: 97.2  F (36.2  C)  Resp: 14  Weight: 63.5 kg (140 lb)  SpO2: 92 %   Recent vital Signs: BP (!) 142/69   Pulse 66   Temp 97.2  F (36.2  C) (Oral)   Resp 14   Wt 63.5 kg (140 lb)   SpO2 97%   BMI 21.29 kg/m      Elimination Status: Continent: Yes     Activity Level: Total assist/lift    Fall Status: Reason for falls risk:  Mobility  bed/chair alarm on    Baseline Mental status: WDL  Current Mental Status changes: at basesline    Infection present or suspected this encounter: no  Sepsis suspected: No    Isolation type: none    Bariatric equipment needed?: No    In the ED these meds were given:   Medications   sodium chloride 0.9 % infusion (1,000 mLs Intravenous $New Bag 8/17/24 2253)   HYDROmorphone (PF) (DILAUDID) injection 0.3 mg (0.3 mg Intravenous $Given 8/17/24 1915)   ondansetron (ZOFRAN) injection 4 mg (4 mg Intravenous $Given 8/17/24 1916)   sodium chloride 0.9% BOLUS 500 mL (0 mLs Intravenous Stopped 8/17/24 2130)   HYDROmorphone (DILAUDID) injection 0.2 mg (0.2 mg Intravenous $Given 8/17/24 2019)   diazepam (VALIUM) injection 2.5 mg (2.5 mg Intravenous $Given 8/17/24 2020)   HYDROmorphone (PF) (DILAUDID) injection  0.3 mg (0.3 mg Intravenous $Given 8/17/24 4739)       Drips running?  No    Home pump  No    Current LDAs: Peripheral IV: Site left A/C; Gauge 18  normal saline     Results:   Labs/Imaging  Ordered and Resulted from Time of ED Arrival Up to the Time of Departure from the ED  Results for orders placed or performed during the hospital encounter of 08/17/24 (from the past 24 hour(s))   CBC with platelets differential    Narrative    The following orders were created for panel order CBC with platelets differential.  Procedure                               Abnormality         Status                     ---------                               -----------         ------                     CBC with platelets and d...[[id number]]  Abnormal            Final result                 Please view results for these tests on the individual orders.   Comprehensive metabolic panel   Result Value Ref Range    Sodium 134 (L) 135 - 145 mmol/L    Potassium 4.0 3.4 - 5.3 mmol/L    Carbon Dioxide (CO2) 25 22 - 29 mmol/L    Anion Gap 8 7 - 15 mmol/L    Urea Nitrogen 12.7 8.0 - 23.0 mg/dL    Creatinine 0.98 (H) 0.51 - 0.95 mg/dL    GFR Estimate 58 (L) >60 mL/min/1.73m2    Calcium 8.5 (L) 8.8 - 10.4 mg/dL    Chloride 101 98 - 107 mmol/L    Glucose 154 (H) 70 - 99 mg/dL    Alkaline Phosphatase 73 40 - 150 U/L    AST 22 0 - 45 U/L    ALT 20 0 - 50 U/L    Protein Total 5.3 (L) 6.4 - 8.3 g/dL    Albumin 3.2 (L) 3.5 - 5.2 g/dL    Bilirubin Total 0.4 <=1.2 mg/dL   CBC with platelets and differential   Result Value Ref Range    WBC Count 8.7 4.0 - 11.0 10e3/uL    RBC Count 3.52 (L) 3.80 - 5.20 10e6/uL    Hemoglobin 9.9 (L) 11.7 - 15.7 g/dL    Hematocrit 29.9 (L) 35.0 - 47.0 %    MCV 85 78 - 100 fL    MCH 28.1 26.5 - 33.0 pg    MCHC 33.1 31.5 - 36.5 g/dL    RDW 14.4 10.0 - 15.0 %    Platelet Count 173 150 - 450 10e3/uL    % Neutrophils 91 %    % Lymphocytes 3 %    % Monocytes 6 %    % Eosinophils 0 %    % Basophils 0 %    % Immature Granulocytes 0 %     NRBCs per 100 WBC 0 <1 /100    Absolute Neutrophils 7.9 1.6 - 8.3 10e3/uL    Absolute Lymphocytes 0.3 (L) 0.8 - 5.3 10e3/uL    Absolute Monocytes 0.5 0.0 - 1.3 10e3/uL    Absolute Eosinophils 0.0 0.0 - 0.7 10e3/uL    Absolute Basophils 0.0 0.0 - 0.2 10e3/uL    Absolute Immature Granulocytes 0.0 <=0.4 10e3/uL    Absolute NRBCs 0.0 10e3/uL   CT Head w/o Contrast    Narrative    EXAM: CT HEAD W/O CONTRAST  LOCATION: [facility]  DATE: 8/17/2024    INDICATION: Closed head injury status post fall.  COMPARISON: None.  TECHNIQUE: Routine CT Head without IV contrast. Multiplanar reformats. Dose reduction techniques were used.    FINDINGS:  INTRACRANIAL CONTENTS: No intracranial hemorrhage, extraaxial collection, or mass effect.  No CT evidence of acute infarct. Mild presumed chronic small vessel ischemic changes. Mild generalized volume loss. No hydrocephalus.     VISUALIZED ORBITS/SINUSES/MASTOIDS: No intraorbital abnormality. No paranasal sinus mucosal disease. No middle ear or mastoid effusion.    BONES/SOFT TISSUES: No acute abnormality.      Impression    IMPRESSION:  1.  No acute intracranial findings.   CT Cervical Spine w/o Contrast    Narrative    EXAM: CT CERVICAL SPINE W/O CONTRAST  LOCATION: [facility]  DATE: 8/17/2024    INDICATION: Neck pain s p fall  COMPARISON: None.  TECHNIQUE: Routine CT Cervical Spine without IV contrast. Multiplanar reformats. Dose reduction techniques were used.    FINDINGS:  VERTEBRA: Normal vertebral body heights. No fracture or posttraumatic subluxation. Minor anterolisthesis C4-C5 and C7-T1, minor retrolisthesis C5-C6.    CANAL/FORAMINA: At C3-4, severe bilateral foraminal stenosis. At C5-6, moderate central stenosis, severe right foraminal stenosis.    PARASPINAL: No extraspinal abnormality.      Impression    IMPRESSION:  1.  No fracture or posttraumatic subluxation.     XR Femur Left 2 Views    Narrative    EXAM: XR FEMUR  LEFT 2 VIEWS  LOCATION: [facility]  DATE: 8/17/2024    INDICATION: Left thigh pain status post fall  COMPARISON: None.      Impression    IMPRESSION: Fracture through the left femoral neck without evidence for intertrochanteric extension. No dislocation. Left femur otherwise negative.   XR Pelvis 1/2 Views    Narrative    EXAM: XR PELVIS 1/2 VIEWS  LOCATION: [facility]  DATE: 8/17/2024    INDICATION: Left hip pain status post fall  COMPARISON: None.      Impression    IMPRESSION: Fracture of the left femoral neck without evidence for intertrochanteric extension. Right hip negative for fracture. Degenerative change both hips. Pelvis negative for fracture.   Lumbar spine XR, 2-3 views    Narrative    EXAM: XR LUMBAR SPINE 2/3 VIEWS  LOCATION: [facility]  DATE/TIME: 8/17/2024 9:36 PM CDT    INDICATION: Lower back pain.   COMPARISON: None.  TECHNIQUE: Radiographs of lumbar spine in routine projections.    FINDINGS:   Nomenclature based on 5 lumbar type vertebral bodies. Mild dextrocurvature L2-3, mild levocurvature L4. Normal sagittal alignment. Normal height of vertebral bodies. Moderate multi level interbody degeneration. Moderate lower lumbar facet arthropathy.   Unremarkable visualized bony pelvis. Abdominal soft tissues are unremarkable.                For the majority of the shift this patient's behavior was Green     Cardiac Rhythm: Other  Pt needs tele? No  Skin/wound Issues: None    Code Status: Full Code    Pain control: fair    Nausea control: good    Abnormal labs/tests/findings requiring intervention: none    Patient tested for COVID 19 prior to admission: NO     OBS brochure/video discussed/provided to patient/family: N/A     Family present during ED course? Yes     Family Comments/Social Situation comments: none    Tasks needing completion: None    Hal Damian, PHOEBE

## 2024-08-18 NOTE — ADDENDUM NOTE
Addendum  created 08/18/24 0218 by Flash Vang APRN CRNA    Child order released for a procedure order, Clinical Note Signed, Intraprocedure Blocks edited, Intraprocedure Event edited, Intraprocedure Meds edited, SmartForm saved

## 2024-08-18 NOTE — ANESTHESIA POSTPROCEDURE EVALUATION
Patient: Jeanna Smith    Procedure: * No procedures listed *       Anesthesia Type:  Peripheral Nerve Block    Note:  Disposition: Inpatient   Postop Pain Control: Uneventful            Sign Out: ONGOING pain issues   PONV: No   Neuro/Psych: Uneventful            Sign Out: Acceptable/Baseline neuro status   Airway/Respiratory: Uneventful            Sign Out: Acceptable/Baseline resp. status   CV/Hemodynamics: Uneventful            Sign Out: Acceptable CV status; No obvious hypovolemia; No obvious fluid overload   Other NRE: NONE   DID A NON-ROUTINE EVENT OCCUR? No    Event details/Postop Comments:  Patient requests help with inadequate pain control with the peripheral nerve block.            Last vitals:  Vitals:    08/18/24 0030 08/18/24 0043 08/18/24 0100   BP: (!) 152/83  (!) 158/80   Pulse: 82 83 84   Resp:   17   Temp:   36.4  C (97.5  F)   SpO2: 98% 96% 97%       Electronically Signed By: ARLENE Shay CRNA  August 18, 2024  2:09 AM

## 2024-08-18 NOTE — H&P
[facility]    History and Physical  Hospital Medicine       Date of Admission:  8/17/2024  Date of Service: 8/17/2024     Assessment & Plan   Jeanna Smith is a 79 year old female who presents on 8/17/2024 with a fall resulting in left femur neck fracture with plan for surgical intervention in the morning.        Closed displaced fracture of left femoral neck (H)    Fall at home, initial encounter  Patient presented with left hip pain after a fall.  This afternoon she was using a walker and felt weak and lightheaded and lost control of her walker and fell on her left hip she also hit her head has some neck pain   Multiple imaging studies obtained in the ED for evaluation of the fall.  CT head showed no acute intracranial process.    CT cervical spine demonstrates no fracture or posttraumatic subluxation  X-ray femur/pelvis demonstrates fracture of the left femoral neck without evidence for intertrochanteric extension. Right hip negative for fracture   Initial labs demonstrate sodium 134, potassium 4(improved from 2.9 yesterday)  Orthopedic surgery was consulted by the ED provider with plan for surgical intervention in the morning.  Anesthesia was consulted for peripheral nerve block for pain control    -Admission to inpatient  -N.p.o. midnight  -Orthopedic surgery consulted with plan for surgical intervention tomorrow  -Anesthesia consulted for peripheral nerve block    Surgery Clearance and RCRI Risk Assessment:   Anesthesia issues: )  Baseline Activity: 4 mets  METS (1 self-care, 4 flight of stairs, >4 heavy house work)  Chest Pain: 0  Shortness of breath: 0  Cardiac Risk Factors/Assessment:                High Risk Surgery: 0 (Ex: vascular, open intraperitoneal, intrathoracic)              History Ischemic Heart Disease: 0 (MI, +stress, nitrate use, current CP thought to be ischemia, ECG with pathological Qs)              History of Congestive Heart Failure: 0              History  of CVA: 0              Preoperative Treatment with Insulin: 0              Preoperative Creatinine greater than 2.0: 0              Total Number of Points: 0 = 0.5% risk of major cardiac event  0 = 0.5; 1 = 1.3%; 2 = 3.6%; 3+ = 9.1%.    - order inpatient EKG pending preoperative evaluation    Patient is cleared for surgery      UTI (urinary tract infection)  The patient was seen yesterday in the ED for evaluation of diarrhea and was found to have UTI, she was sent home on Bactrim.    Urine culture showed E. coli that is resistant to Bactrim, and ampicillin, sensitive to Macrobid spells point and Cipro  Patient has known history of allergy to penicillin, she declined IV antibiotics and would like oral instead.  She tolerated Macrobid in the past.  She is agreeable to take Macrobid to treat her UTI.    Hypothyroidism  Continue PTA Harrell Thyroid      Anemia  Appears to be chronic normocytic anemia with baseline hemoglobin 9-9.9  Hemoglobin admission 9.9 at baseline  No current concern for bleeding  Continue PTA iron    Clinically Significant Risk Factors Present on Admission        # Hypokalemia: Lowest K = 2.9 mmol/L in last 2 days, will replace as needed       # Hypoalbuminemia: Lowest albumin = 3.1 g/dL at 8/16/2024  6:16 PM, will monitor as appropriate          # Anemia: based on hgb <11                             Diet:  NPO midnight   DVT Prophylaxis: Pneumatic Compression Devices  Hopkins Catheter: Not present  Code Status:  full code   Lines: IV Line     Disposition Plan      Expected Discharge Date: 08/19/2024             Entered: Alma Levy MD 08/17/2024, 11:19 PM     Status: Patient is appropriate for inpatient   Alma Levy MD        The patient's care was discussed with the Patient and Patient's Family.    Primary Care Physician   Jung Pacheco [phone]    History is obtained from the patient,  and review of old records via the EMR.    History of Present Illness   Jeanna MAXINE Luis is a 79  year old female with past medical history of left breast cancer, GERD, osteopenia, hypothyroidism, s/p gastric bypass (in 2004), migraine headache, anxiety and depression  now presents on 8/17/2024 with left hip pain after a fall.  The patient was seen yesterday in the ED for evaluation of diarrhea and was found to have UTI, she was sent home on Bactrim.  Urine culture showed E. coli that is resistant to Bactrim  This afternoon she was using a walker and felt weak and lightheaded and lost control of her walker and fell on her left hip she also hit her head has some neck pain   In the ED she was hemodynamically stable.  Multiple imaging studies obtained in the ED for evaluation of the fall.  CT head showed no acute intracranial process.    CT cervical spine demonstrates no fracture or posttraumatic subluxation  X-ray femur/pelvis demonstrates fracture of the left femoral neck without evidence for intertrochanteric extension. Right hip negative for fracture   Initial labs demonstrate sodium 134, potassium 4(improved from 2.9 yesterday)  Orthopedic surgery was consulted by the ED provider with plan for surgical intervention in the morning.  Anesthesia was consulted for peripheral nerve block for pain control      Review of Systems   The 10 point Review of Systems is negative other than noted in the HPI or here.     Past Medical History      Past Medical History:   Diagnosis Date    Breast cancer (H)     2008 left     Breast cyst     Hypothyroidism      Patient Active Problem List    Diagnosis Date Noted    Fall at home, initial encounter 08/17/2024     Priority: High    Closed displaced fracture of left femoral neck (H) 08/17/2024     Priority: High    H/O urinary tract infection 08/17/2024     Priority: Medium    Anemia 08/17/2024     Priority: Medium    Hematoma 10/31/2018     Priority: Medium    Syncope 10/30/2018     Priority: Medium    CARDIOVASCULAR SCREENING; LDL GOAL LESS THAN 160 10/31/2010     Priority:  Medium    Contact dermatitis of eyelid 2009     Priority: Medium    UTI (urinary tract infection) 2009     Priority: Medium    Hypothyroidism 2009     Priority: Medium        Past Surgical History     Past Surgical History:   Procedure Laterality Date    BIOPSY BREAST Left 2008    BREAST CYST ASPIRATION Left 10/2008    GASTRIC BYPASS  2004    HYSTERECTOMY      LUMPECTOMY BREAST Left 2008    OOPHORECTOMY      RECTOCELE REPAIR  ,    TONSILLECTOMY      [facility] ANESTH,EXCIS RETROPHARYNG TUMOR  ,2006,    fatty tumors removed    [facility] OOPH W/RADIC DISSECT FOR DEBULKING      [facility] VAGINAL HYSTERECTOMY  -        Prior to Admission Medications   Prior to Admission Medications   Prescriptions Last Dose Informant Patient Reported? Taking?   ARMOUR THYROID 60 MG OR TABS 2024 at am Self Yes Yes   Si TABLET DAILY   IRON PO 2024 at am Self Yes Yes   Sig: Take 1 tablet by mouth every evening   Omega-3 Fatty Acids (OMEGA-3 PO) 2024 at am Self Yes Yes   Sig: Take 1 capsule by mouth daily   VITAMIN D PO 2024 at am Self Yes Yes   Sig: Take by mouth daily   buPROPion (WELLBUTRIN XL) 150 MG 24 hr tablet 2024 at am Self Yes Yes   Sig: Take 1 tablet by mouth daily   carboxymethylcellulose PF (REFRESH LIQUIGEL) 1 % ophthalmic gel 2024 at am Self Yes Yes   Sig: Place 1 drop into both eyes 2 times daily   naproxen sodium 220 MG capsule 2024 at am Self Yes Yes   Sig: Take 440 mg by mouth as needed   pantoprazole (PROTONIX) 40 MG EC tablet 2024 at am Self Yes Yes   Sig: Take 40 mg by mouth daily   potassium chloride gurmeet ER (KLOR-CON M20) 20 MEQ CR tablet 2024 at am Self No Yes   Sig: Take 1 tablet (20 mEq) by mouth 2 times daily for 3 days   sulfamethoxazole-trimethoprim (BACTRIM DS) 800-160 MG tablet 2024 at am Self No Yes   Sig: Take 1 tablet by mouth 2 times daily for 7 days   vitamin B-Complex 2024 at am Self Yes Yes   Sig: Take 1 tablet by  mouth daily      Facility-Administered Medications: None     Allergies   Allergies   Allergen Reactions    Darvocet [Acetaminophen]     Erythromycin Nausea    Pcn [Penicillins]        Family History    Family History   Problem Relation Age of Onset    Arthritis Mother     Heart Disease Mother         TIA's    Respiratory Father         emphysema    No Known Problems Sister     No Known Problems Daughter     No Known Problems Maternal Grandmother     No Known Problems Maternal Grandfather     No Known Problems Paternal Grandmother     No Known Problems Paternal Grandfather     No Known Problems Maternal Aunt     No Known Problems Paternal Aunt     Hereditary Breast and Ovarian Cancer Syndrome No family hx of     Breast Cancer No family hx of     Cancer No family hx of     Colon Cancer No family hx of     Endometrial Cancer No family hx of     Ovarian Cancer No family hx of        Social History   Social History     Socioeconomic History    Marital status:      Spouse name: Not on file    Number of children: Not on file    Years of education: Not on file    Highest education level: Not on file   Occupational History    Not on file   Tobacco Use    Smoking status: Not on file    Smokeless tobacco: Not on file   Substance and Sexual Activity    Alcohol use: No    Drug use: No    Sexual activity: Yes     Partners: Male   Other Topics Concern    Parent/sibling w/ CABG, MI or angioplasty before 65F 55M? Not Asked     Service No    Blood Transfusions No    Caffeine Concern Yes     Comment: 1 cup a coffee a day    Occupational Exposure No    Hobby Hazards No    Sleep Concern No    Stress Concern No    Weight Concern No    Special Diet No    Back Care No    Exercise Yes     Comment: little    Bike Helmet No    Seat Belt Yes    Self-Exams Yes   Social History Narrative    Not on file     Social Determinants of Health     Financial Resource Strain: Not on file   Food Insecurity: Not on file   Transportation  Needs: Not on file   Physical Activity: Not on file   Stress: Not on file   Social Connections: Not on file   Interpersonal Safety: Not on file   Housing Stability: Not on file       Physical Exam   BP (!) 142/69   Pulse 66   Temp 97.2  F (36.2  C) (Oral)   Resp 14   Wt 63.5 kg (140 lb)   SpO2 97%   BMI 21.29 kg/m       Weight: 140 lbs 0 oz Body mass index is 21.29 kg/m .     Constitutional: Alert, oriented, cooperative, no apparent distress, appears nontoxic,  Eyes: Eyes are clear, pupils are reactive.  HENT: No evidence of cranial trauma.  Genitourinary: Deferred  Musculoskeletal: Normal muscle bulk and tone.  Skin: Warm and dry, no rashes.   Neurologic: Neck supple. Cranial nerves are grossly intact.  is symmetric.     Data   Data reviewed today:   Recent Labs   Lab 08/17/24  1926 08/16/24  1816   WBC 8.7 9.7   HGB 9.9* 9.8*   MCV 85 85    205   * 136   POTASSIUM 4.0 2.9*   CHLORIDE 101 104   CO2 25 23   BUN 12.7 13.6   CR 0.98* 0.82   ANIONGAP 8 9   CHANNING 8.5* 7.8*   * 145*   ALBUMIN 3.2* 3.1*   PROTTOTAL 5.3* 5.1*   BILITOTAL 0.4 0.6   ALKPHOS 73 68   ALT 20 12   AST 22 17       Recent Results (from the past 24 hour(s))   CT Head w/o Contrast    Narrative    EXAM: CT HEAD W/O CONTRAST  LOCATION: [facility]  DATE: 8/17/2024    INDICATION: Closed head injury status post fall.  COMPARISON: None.  TECHNIQUE: Routine CT Head without IV contrast. Multiplanar reformats. Dose reduction techniques were used.    FINDINGS:  INTRACRANIAL CONTENTS: No intracranial hemorrhage, extraaxial collection, or mass effect.  No CT evidence of acute infarct. Mild presumed chronic small vessel ischemic changes. Mild generalized volume loss. No hydrocephalus.     VISUALIZED ORBITS/SINUSES/MASTOIDS: No intraorbital abnormality. No paranasal sinus mucosal disease. No middle ear or mastoid effusion.    BONES/SOFT TISSUES: No acute abnormality.      Impression    IMPRESSION:  1.  No  acute intracranial findings.   CT Cervical Spine w/o Contrast    Narrative    EXAM: CT CERVICAL SPINE W/O CONTRAST  LOCATION: [facility]  DATE: 8/17/2024    INDICATION: Neck pain s p fall  COMPARISON: None.  TECHNIQUE: Routine CT Cervical Spine without IV contrast. Multiplanar reformats. Dose reduction techniques were used.    FINDINGS:  VERTEBRA: Normal vertebral body heights. No fracture or posttraumatic subluxation. Minor anterolisthesis C4-C5 and C7-T1, minor retrolisthesis C5-C6.    CANAL/FORAMINA: At C3-4, severe bilateral foraminal stenosis. At C5-6, moderate central stenosis, severe right foraminal stenosis.    PARASPINAL: No extraspinal abnormality.      Impression    IMPRESSION:  1.  No fracture or posttraumatic subluxation.     XR Femur Left 2 Views    Narrative    EXAM: XR FEMUR LEFT 2 VIEWS  LOCATION: [facility]  DATE: 8/17/2024    INDICATION: Left thigh pain status post fall  COMPARISON: None.      Impression    IMPRESSION: Fracture through the left femoral neck without evidence for intertrochanteric extension. No dislocation. Left femur otherwise negative.   XR Pelvis 1/2 Views    Narrative    EXAM: XR PELVIS 1/2 VIEWS  LOCATION: [facility]  DATE: 8/17/2024    INDICATION: Left hip pain status post fall  COMPARISON: None.      Impression    IMPRESSION: Fracture of the left femoral neck without evidence for intertrochanteric extension. Right hip negative for fracture. Degenerative change both hips. Pelvis negative for fracture.   Lumbar spine XR, 2-3 views    Narrative    EXAM: XR LUMBAR SPINE 2/3 VIEWS  LOCATION: [facility]  DATE/TIME: 8/17/2024 9:36 PM CDT    INDICATION: Lower back pain.   COMPARISON: None.  TECHNIQUE: Radiographs of lumbar spine in routine projections.    FINDINGS:   Nomenclature based on 5 lumbar type vertebral bodies. Mild dextrocurvature L2-3, mild levocurvature L4.  Normal sagittal alignment. Normal height of vertebral bodies. Moderate multi level interbody degeneration. Moderate lower lumbar facet arthropathy.   Unremarkable visualized bony pelvis. Abdominal soft tissues are unremarkable.                I personally reviewed the X ray pelvis  image(s) showing left hip fracture

## 2024-08-18 NOTE — PROGRESS NOTES
[facility] Medicine Progress Note  Date of Service: 08/18/2024    Assessment & Plan   Jeanna Smith is a 79 year old female who presents on 8/17/2024 with a fall resulting in left femur neck fracture with plan for surgical intervention in the morning.      Closed displaced fracture of left femoral neck  Fall at home, initial encounter    Patient presented with left hip pain after a fall. 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 At baseline does not use a walker and was actually throwing straw [place] just a few days PTA.     CT head showed no acute intracranial process.      CT cervical spine demonstrates no fracture or posttraumatic subluxation    X-ray femur/pelvis demonstrates fracture of the left femoral neck without evidence for intertrochanteric extension. Right hip negative for fracture     Orthopedic surgery was consulted by the ED provider with plan for surgical intervention 8/18.  Anesthesia was consulted for peripheral nerve block for pain control.   - N.p.o. for surgery this afternoon.    Surgery Clearance and RCRI Risk Assessment:   Anesthesia issues: )  Baseline Activity: >4 mets  METS (1 self-care, 4 flight of stairs, >4 heavy house work)  Chest Pain: 0  Shortness of breath: 0  Cardiac Risk Factors/Assessment:    High Risk Surgery: 0 (Ex: vascular, open intraperitoneal, intrathoracic)  History Ischemic Heart Disease: 0               History of Congestive Heart Failure: 0              History of CVA: 0              Preoperative Treatment with Insulin: 0              Preoperative Creatinine greater than 2.0: 0              Total Number of Points: 0 = 0.5% risk of major cardiac event  Preop ECG: Normal  No further  preop work up needed. Appears stable for OR today.     UTI (urinary tract infection)    The patient was seen 1 day PTA in the ED for evaluation of diarrhea and was found to have UTI, she was sent home on Bactrim.  Urine culture subsequently positive for E. coli that is resistant to Bactrim, and ampicillin, sensitive to nitrofurantoin, flouroquinolones, cephalosporins. No systemic signs of infection.  Known history of allergy to penicillin though it was when she was a child and thinks it might have been a rash.  She declined IV antibiotics on admission and preferred oral instead.  She tolerated nitrofurantoin in the past and was started on nitrofurantoin 100 mg BID PM of 8/18.    - continue nitrofurantoin. If cefazolin used for viet-op antibiotic, this would also add to the treatment of the UTI.     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 Received IVF in ED and overnight. Appears rehydrated 8/18 AM.   - continue  until after surgery later today    Diarrhea illness    Preceding week PTA. No diarrhea x 2 days as of 8/18 AM. Appears resolved.     Hypothyroidism   - Continue PTA [place] Thyroid    Anemia    Appears to be chronic normocytic anemia with baseline hemoglobin 9-9.9    Hemoglobin admission 9.9 at baseline    No current concern for bleeding   - Hold PTA iron while receiving narcotic to decrease constipation    H/o Gastric bypass at age 59 [facility]     History of Breast Cancer, Left     S/p lumpectomy, refused radiation (would not risk scar to heart or lungs)     Normal mammogram 9/2023    Hysterectomy at age 45, removed 1 ovary, removed 2nd ovary at a later date.     Elevated PTH hormone on Vitamin D supplement     Clinically Significant Risk Factors Present on Admission        # Hypokalemia: Lowest K = 2.9 mmol/L in last 2 days, will replace as needed       # Hypoalbuminemia: Lowest albumin = 3.1 g/dL at  8/16/2024  6:16 PM, will monitor as appropriate        # Anemia: based on hgb <11                  Diet: NPO per Anesthesia Guidelines for Procedure/Surgery Except for: Meds    DVT Prophylaxis: Pneumatic Compression Devices  Hopkins Catheter: Not present  Lines: None     Cardiac Monitoring: None  Code Status: Full Code      Discussion: Stable for surgery today. Anticipate home in 1-3 days post op.     Medically Ready for Discharge: Anticipated in 2-4 Days         Medical Decision Making       25 MINUTES SPENT BY ME on the date of service doing chart review, history, exam, documentation & further activities per the note.        Benja Lynn MD  [facility] Medicine    [facility]  Securely message with [employer] (more info)  Text page via [employer] Paging/Directory     Interval History   HPI reviewed.   Recent diarrhea resolved 2 days ago.   Good urine output today. \  No chest pain, shortness of breath.   Asking lots of questions about the surgery which I deferred to Dr. Barajas.     Physical Exam   Temp:  [97.2  F (36.2  C)-97.6  F (36.4  C)] 97.6  F (36.4  C)  Pulse:  [62-84] 68  Resp:  [14-18] 18  BP: (117-158)/() 136/97  SpO2:  [83 %-100 %] 98 %    Weights:   Vitals:    08/17/24 1808 08/18/24 0600   Weight: 63.5 kg (140 lb) 67.3 kg (148 lb 5.9 oz)    Body mass index is 22.56 kg/m .    Constitutional: alert and oriented, NAD  CV: Regular, no edema, no JVD, no murmur  Respiratory: CTA bilaterally  GI: Soft, non-tender, bowel sounds normal  Skin: Warm and dry  Musculoskeletal: left lower extremity shortened and externally rotated.    Data   Recent Labs   Lab 08/18/24  0513 08/17/24  1926 08/16/24  1816   WBC 7.9 8.7 9.7   HGB 10.3* 9.9* 9.8*   MCV 86 85 85    173 205    134* 136   POTASSIUM 4.2 4.0 2.9*   CHLORIDE 104 101 104   CO2 25 25 23   BUN 11.9 12.7 13.6   CR 0.98* 0.98* 0.82   ANIONGAP 8 8 9   CHANNING 9.0 8.5* 7.8*   * 154* 145*   ALBUMIN  --  3.2* 3.1*   PROTTOTAL  --   5.3* 5.1*   BILITOTAL  --  0.4 0.6   ALKPHOS  --  73 68   ALT  --  20 12   AST  --  22 17       Recent Labs   Lab 08/18/24  0513 08/17/24  1926 08/16/24  1816   * 154* 145*        Unresulted Labs Ordered in the Past 30 Days of this Admission       No orders found for last 31 day(s).             Imaging:   Recent Results (from the past 24 hour(s))   CT Head w/o Contrast    Narrative    EXAM: CT HEAD W/O CONTRAST  LOCATION: [facility]  DATE: 8/17/2024    INDICATION: Closed head injury status post fall.  COMPARISON: None.  TECHNIQUE: Routine CT Head without IV contrast. Multiplanar reformats. Dose reduction techniques were used.    FINDINGS:  INTRACRANIAL CONTENTS: No intracranial hemorrhage, extraaxial collection, or mass effect.  No CT evidence of acute infarct. Mild presumed chronic small vessel ischemic changes. Mild generalized volume loss. No hydrocephalus.     VISUALIZED ORBITS/SINUSES/MASTOIDS: No intraorbital abnormality. No paranasal sinus mucosal disease. No middle ear or mastoid effusion.    BONES/SOFT TISSUES: No acute abnormality.      Impression    IMPRESSION:  1.  No acute intracranial findings.   CT Cervical Spine w/o Contrast    Narrative    EXAM: CT CERVICAL SPINE W/O CONTRAST  LOCATION: [facility]  DATE: 8/17/2024    INDICATION: Neck pain s p fall  COMPARISON: None.  TECHNIQUE: Routine CT Cervical Spine without IV contrast. Multiplanar reformats. Dose reduction techniques were used.    FINDINGS:  VERTEBRA: Normal vertebral body heights. No fracture or posttraumatic subluxation. Minor anterolisthesis C4-C5 and C7-T1, minor retrolisthesis C5-C6.    CANAL/FORAMINA: At C3-4, severe bilateral foraminal stenosis. At C5-6, moderate central stenosis, severe right foraminal stenosis.    PARASPINAL: No extraspinal abnormality.      Impression    IMPRESSION:  1.  No fracture or posttraumatic subluxation.     XR Femur Left 2 Views    Narrative     EXAM: XR FEMUR LEFT 2 VIEWS  LOCATION: [facility]  DATE: 8/17/2024    INDICATION: Left thigh pain status post fall  COMPARISON: None.      Impression    IMPRESSION: Fracture through the left femoral neck without evidence for intertrochanteric extension. No dislocation. Left femur otherwise negative.   XR Pelvis 1/2 Views    Narrative    EXAM: XR PELVIS 1/2 VIEWS  LOCATION: [facility]  DATE: 8/17/2024    INDICATION: Left hip pain status post fall  COMPARISON: None.      Impression    IMPRESSION: Fracture of the left femoral neck without evidence for intertrochanteric extension. Right hip negative for fracture. Degenerative change both hips. Pelvis negative for fracture.   Lumbar spine XR, 2-3 views    Narrative    EXAM: XR LUMBAR SPINE 2/3 VIEWS  LOCATION: [facility]  DATE/TIME: 8/17/2024 9:36 PM CDT    INDICATION: Lower back pain.   COMPARISON: None.  TECHNIQUE: Radiographs of lumbar spine in routine projections.    FINDINGS:   Nomenclature based on 5 lumbar type vertebral bodies. Mild dextrocurvature L2-3, mild levocurvature L4. Normal sagittal alignment. Normal height of vertebral bodies. Moderate multi level interbody degeneration. Moderate lower lumbar facet arthropathy.   Unremarkable visualized bony pelvis. Abdominal soft tissues are unremarkable.            POC US GUIDANCE NEEDLE PLACEMENT    Impression    Anatomy visualized well         I reviewed all new labs and imaging results over the last 24 hours. I personally reviewed the EKG tracing showing Normal .    Medications   Current Facility-Administered Medications   Medication Dose Route Frequency Provider Last Rate Last Admin    sodium chloride 0.9 % infusion  1,000 mL Intravenous Continuous Marcus Ceron  mL/hr at 08/18/24 0838 1,000 mL at 08/18/24 0838     Current Facility-Administered Medications   Medication Dose Route Frequency Provider Last Rate Last  Admin    buPROPion (WELLBUTRIN XL) 24 hr tablet 150 mg  150 mg Oral Daily Alma Levy MD   150 mg at 08/18/24 0836    carboxymethylcellulose PF (REFRESH PLUS) 0.5 % ophthalmic solution 1 drop  1 drop Both Eyes BID Alma Levy MD   1 drop at 08/18/24 0832    ferrous sulfate (FEROSUL) tablet 325 mg  325 mg Oral QPM Alma Levy MD        nitroFURantoin macrocrystal-monohydrate (MACROBID) capsule 100 mg  100 mg Oral Q12H [facility] (08/20) Alma Levy MD   100 mg at 08/18/24 0833    pantoprazole (PROTONIX) EC tablet 40 mg  40 mg Oral Daily Alma Levy MD   40 mg at 08/18/24 0833    sodium chloride (PF) 0.9% PF flush 3 mL  3 mL Intracatheter Q8H Alma Levy MD        thyroid (ARMOUR) tablet 60 mg  60 mg Oral Daily Alma Levy MD Jay Hemmila, MD  [facility] Medicine

## 2024-08-18 NOTE — PROGRESS NOTES
[facility] [facility] TRANSPORT NOTE  Data:   Reason for Transport:  Surgery    Jeanna Smith was transported to Surgery via bed at 1555.  Patient was accompanied by Nursing Assistant and RN. Equipment used for transport: None. Family was aware of reason for transport: yes    Action:  Report: given to Margie LYNCH RN    Response:  Patient's condition when transferred off unit was stable.    Kenzie Watson, RN

## 2024-08-18 NOTE — PROGRESS NOTES
"Patient arrived back to Med/Surg room at 1810. Complained of 8/10 pain, 0.2 mg IV dilaudid given. IV fluids infusing. Ate some tomato soup and a few bites of turkey sandwich before stating she \"just wants to sleep.\"  On 2L NC to keep sats above 90%. Capno on.  "

## 2024-08-18 NOTE — ANESTHESIA PREPROCEDURE EVALUATION
Anesthesia Pre-Procedure Evaluation    Patient: Jeanna Smith   MRN: [id number] : 1945        Procedure :           Past Medical History:   Diagnosis Date    Breast cancer (H)     2008 left     Breast cyst     Hypothyroidism       Past Surgical History:   Procedure Laterality Date    BIOPSY BREAST Left 2008    BREAST CYST ASPIRATION Left 10/2008    GASTRIC BYPASS  2004    HYSTERECTOMY      LUMPECTOMY BREAST Left 2008    OOPHORECTOMY      RECTOCELE REPAIR  ,    TONSILLECTOMY      [facility] ANESTH,EXCIS RETROPHARYNG TUMOR  ,,    fatty tumors removed    [facility] OOPH W/RADIC DISSECT FOR DEBULKING      [facility] VAGINAL HYSTERECTOMY  -      Allergies   Allergen Reactions    Darvocet [Acetaminophen]     Erythromycin Nausea    Pcn [Penicillins]       Social History     Tobacco Use    Smoking status: Not on file    Smokeless tobacco: Not on file   Substance Use Topics    Alcohol use: No      Wt Readings from Last 1 Encounters:   24 67.3 kg (148 lb 5.9 oz)        Anesthesia Evaluation   Pt has had prior anesthetic. Type: General and MAC.        ROS/MED HX  ENT/Pulmonary:  - neg pulmonary ROS     Neurologic:  - neg neurologic ROS     Cardiovascular:     (+)  - -   -  - -             fainting (syncope).                         METS/Exercise Tolerance:     Hematologic:  - neg hematologic  ROS     Musculoskeletal: Comment: Closed displaced fracture of the left femoral neck  (+)  arthritis,             GI/Hepatic: Comment: Gastric bypass surgery in past      Renal/Genitourinary:  - neg Renal ROS     Endo:     (+)          thyroid problem, hypothyroidism,           Psychiatric/Substance Use:  - neg psychiatric ROS     Infectious Disease:       Malignancy:   (+) Malignancy, History of Breast.Breast CA status post Surgery.      Other:  - neg other ROS          Physical Exam    Airway  airway exam normal           Respiratory Devices and Support         Dental           Cardiovascular    "cardiovascular exam normal          Pulmonary   pulmonary exam normal                OUTSIDE LABS:  CBC:   Lab Results   Component Value Date    WBC 7.9 08/18/2024    WBC 8.7 08/17/2024    HGB 10.3 (L) 08/18/2024    HGB 9.9 (L) 08/17/2024    HCT 31.4 (L) 08/18/2024    HCT 29.9 (L) 08/17/2024     08/18/2024     08/17/2024     BMP:   Lab Results   Component Value Date     08/18/2024     (L) 08/17/2024    POTASSIUM 4.2 08/18/2024    POTASSIUM 4.0 08/17/2024    CHLORIDE 104 08/18/2024    CHLORIDE 101 08/17/2024    CO2 25 08/18/2024    CO2 25 08/17/2024    BUN 11.9 08/18/2024    BUN 12.7 08/17/2024    CR 0.98 (H) 08/18/2024    CR 0.98 (H) 08/17/2024     (H) 08/18/2024     (H) 08/17/2024     COAGS: No results found for: \"PTT\", \"INR\", \"FIBR\"  POC: No results found for: \"BGM\", \"HCG\", \"HCGS\"  HEPATIC:   Lab Results   Component Value Date    ALBUMIN 3.2 (L) 08/17/2024    PROTTOTAL 5.3 (L) 08/17/2024    ALT 20 08/17/2024    AST 22 08/17/2024    ALKPHOS 73 08/17/2024    BILITOTAL 0.4 08/17/2024     OTHER:   Lab Results   Component Value Date    CHANNING 9.0 08/18/2024    PHOS 2.9 08/18/2024    MAG 2.0 08/18/2024    TSH 1.56 07/24/2024    CRP 0.43 05/02/2005       Anesthesia Plan    ASA Status:  4, emergent    - Procedure: Procedure only, no anesthetic delivered      Anesthesia Type: Spinal.              Consents    Anesthesia Plan(s) and associated risks, benefits, and realistic alternatives discussed. Questions answered and patient/representative(s) expressed understanding.     - Discussed: Risks, Benefits and Alternatives for the PROCEDURE were discussed     - Discussed with:  Patient            Postoperative Care    Pain management: Peripheral nerve block (Single Shot).        Comments:               ARLENE Brandon CRNA    I have reviewed the pertinent notes and labs in the chart from the past 30 days and (re)examined the patient.  Any updates or changes from those notes are reflected " in this note.    # Hypokalemia: Lowest K = 2.9 mmol/L in last 2 days, will replace as needed  # Hyponatremia: Lowest Na = 134 mmol/L in last 30 days, will monitor as appropriate      # Hypoalbuminemia: Lowest albumin = 3.1 g/dL at 8/16/2024  6:16 PM, will monitor as appropriate

## 2024-08-19 ENCOUNTER — APPOINTMENT (OUTPATIENT)
Dept: PHYSICAL THERAPY | Facility: CLINIC | Age: 79
DRG: 522 | End: 2024-08-19
Payer: MEDICARE

## 2024-08-19 ENCOUNTER — APPOINTMENT (OUTPATIENT)
Dept: OCCUPATIONAL THERAPY | Facility: CLINIC | Age: 79
DRG: 522 | End: 2024-08-19
Payer: MEDICARE

## 2024-08-19 LAB
GLUCOSE SERPL-MCNC: 127 MG/DL (ref 70–99)
HGB BLD-MCNC: 10 G/DL (ref 11.7–15.7)
MAGNESIUM SERPL-MCNC: 2 MG/DL (ref 1.7–2.3)
PHOSPHATE SERPL-MCNC: 2.9 MG/DL (ref 2.5–4.5)
POTASSIUM SERPL-SCNC: 4.3 MMOL/L (ref 3.4–5.3)

## 2024-08-19 PROCEDURE — 250N000013 HC RX MED GY IP 250 OP 250 PS 637: Performed by: ORTHOPAEDIC SURGERY

## 2024-08-19 PROCEDURE — 97530 THERAPEUTIC ACTIVITIES: CPT | Mod: GP | Performed by: PHYSICAL THERAPIST

## 2024-08-19 PROCEDURE — 84100 ASSAY OF PHOSPHORUS: CPT | Performed by: INTERNAL MEDICINE

## 2024-08-19 PROCEDURE — 120N000001 HC R&B MED SURG/OB

## 2024-08-19 PROCEDURE — 83735 ASSAY OF MAGNESIUM: CPT | Performed by: INTERNAL MEDICINE

## 2024-08-19 PROCEDURE — 85018 HEMOGLOBIN: CPT | Performed by: ORTHOPAEDIC SURGERY

## 2024-08-19 PROCEDURE — 99232 SBSQ HOSP IP/OBS MODERATE 35: CPT

## 2024-08-19 PROCEDURE — 97535 SELF CARE MNGMENT TRAINING: CPT | Mod: GO | Performed by: OCCUPATIONAL THERAPIST

## 2024-08-19 PROCEDURE — 250N000013 HC RX MED GY IP 250 OP 250 PS 637: Performed by: INTERNAL MEDICINE

## 2024-08-19 PROCEDURE — 97161 PT EVAL LOW COMPLEX 20 MIN: CPT | Mod: GP | Performed by: PHYSICAL THERAPIST

## 2024-08-19 PROCEDURE — 250N000013 HC RX MED GY IP 250 OP 250 PS 637: Performed by: FAMILY MEDICINE

## 2024-08-19 PROCEDURE — 97165 OT EVAL LOW COMPLEX 30 MIN: CPT | Mod: GO | Performed by: OCCUPATIONAL THERAPIST

## 2024-08-19 PROCEDURE — 36415 COLL VENOUS BLD VENIPUNCTURE: CPT | Performed by: ORTHOPAEDIC SURGERY

## 2024-08-19 PROCEDURE — 250N000011 HC RX IP 250 OP 636: Performed by: ORTHOPAEDIC SURGERY

## 2024-08-19 PROCEDURE — 84132 ASSAY OF SERUM POTASSIUM: CPT | Performed by: INTERNAL MEDICINE

## 2024-08-19 PROCEDURE — 82947 ASSAY GLUCOSE BLOOD QUANT: CPT | Performed by: INTERNAL MEDICINE

## 2024-08-19 RX ORDER — IBUPROFEN 400 MG/1
400 TABLET, FILM COATED ORAL ONCE
Status: COMPLETED | OUTPATIENT
Start: 2024-08-19 | End: 2024-08-19

## 2024-08-19 RX ORDER — ASPIRIN 81 MG/1
81 TABLET ORAL 2 TIMES DAILY
Status: SHIPPED
Start: 2024-08-19

## 2024-08-19 RX ADMIN — IBUPROFEN 400 MG: 400 TABLET ORAL at 21:30

## 2024-08-19 RX ADMIN — NITROFURANTOIN (MONOHYDRATE/MACROCRYSTALS) 100 MG: 75; 25 CAPSULE ORAL at 20:08

## 2024-08-19 RX ADMIN — CARBOXYMETHYLCELLULOSE SODIUM 1 DROP: 0.5 SOLUTION/ DROPS OPHTHALMIC at 20:08

## 2024-08-19 RX ADMIN — ASPIRIN 81 MG: 81 TABLET, COATED ORAL at 08:05

## 2024-08-19 RX ADMIN — OXYCODONE HYDROCHLORIDE 5 MG: 5 TABLET ORAL at 02:31

## 2024-08-19 RX ADMIN — SENNOSIDES AND DOCUSATE SODIUM 1 TABLET: 50; 8.6 TABLET ORAL at 08:06

## 2024-08-19 RX ADMIN — OXYCODONE HYDROCHLORIDE 5 MG: 5 TABLET ORAL at 09:59

## 2024-08-19 RX ADMIN — CEFAZOLIN 1 G: 1 INJECTION, POWDER, FOR SOLUTION INTRAMUSCULAR; INTRAVENOUS at 06:17

## 2024-08-19 RX ADMIN — THYROID, PORCINE 60 MG: 30 TABLET ORAL at 08:08

## 2024-08-19 RX ADMIN — ASPIRIN 81 MG: 81 TABLET, COATED ORAL at 20:08

## 2024-08-19 RX ADMIN — NITROFURANTOIN (MONOHYDRATE/MACROCRYSTALS) 100 MG: 75; 25 CAPSULE ORAL at 08:06

## 2024-08-19 RX ADMIN — OXYCODONE HYDROCHLORIDE 5 MG: 5 TABLET ORAL at 08:07

## 2024-08-19 RX ADMIN — BUPROPION HYDROCHLORIDE 150 MG: 150 TABLET, FILM COATED, EXTENDED RELEASE ORAL at 08:05

## 2024-08-19 RX ADMIN — ACETAMINOPHEN 975 MG: 325 TABLET, FILM COATED ORAL at 12:06

## 2024-08-19 RX ADMIN — SENNOSIDES AND DOCUSATE SODIUM 1 TABLET: 50; 8.6 TABLET ORAL at 20:08

## 2024-08-19 RX ADMIN — PANTOPRAZOLE SODIUM 40 MG: 40 TABLET, DELAYED RELEASE ORAL at 08:06

## 2024-08-19 RX ADMIN — ACETAMINOPHEN 975 MG: 325 TABLET, FILM COATED ORAL at 05:06

## 2024-08-19 RX ADMIN — CARBOXYMETHYLCELLULOSE SODIUM 1 DROP: 0.5 SOLUTION/ DROPS OPHTHALMIC at 08:05

## 2024-08-19 RX ADMIN — OXYCODONE HYDROCHLORIDE 5 MG: 5 TABLET ORAL at 13:05

## 2024-08-19 RX ADMIN — ACETAMINOPHEN 975 MG: 325 TABLET, FILM COATED ORAL at 20:08

## 2024-08-19 RX ADMIN — OXYCODONE HYDROCHLORIDE 10 MG: 5 TABLET ORAL at 21:30

## 2024-08-19 RX ADMIN — OXYCODONE HYDROCHLORIDE 5 MG: 5 TABLET ORAL at 17:28

## 2024-08-19 ASSESSMENT — ACTIVITIES OF DAILY LIVING (ADL)
ADLS_ACUITY_SCORE: 28
ADLS_ACUITY_SCORE: 27
ADLS_ACUITY_SCORE: 28
ADLS_ACUITY_SCORE: 27
ADLS_ACUITY_SCORE: 28
ADLS_ACUITY_SCORE: 27
ADLS_ACUITY_SCORE: 27
ADLS_ACUITY_SCORE: 28
ADLS_ACUITY_SCORE: 28
ADLS_ACUITY_SCORE: 27
ADLS_ACUITY_SCORE: 27
ADLS_ACUITY_SCORE: 28
ADLS_ACUITY_SCORE: 27
ADLS_ACUITY_SCORE: 28
ADLS_ACUITY_SCORE: 27
ADLS_ACUITY_SCORE: 28

## 2024-08-19 NOTE — PROGRESS NOTES
08/19/24 1100   Appointment Info   Signing Clinician's Name / Credentials (OT) Kameron Blackwood OTR/L   Living Environment   People in Home alone;other (see comments)  (renter also in the home)   Current Living Arrangements house   Home Accessibility stairs within home   Number of Stairs, Main Entrance   (ramp)   Number of Stairs, Within Home, Primary greater than 10 stairs   Living Environment Comments renter in lower level   Self-Care   Usual Activity Tolerance excellent   Current Activity Tolerance moderate   Equipment Currently Used at Home cane, straight;walker, rolling   Activity/Exercise/Self-Care Comment elevated toilets, stefan   General Information   Onset of Illness/Injury or Date of Surgery 08/17/24   Referring Physician Rashard Barajas MD   Patient/Family Therapy Goal Statement (OT) Pt is insistent about not returning home, but rather to TCU.  Has concerns about her stairs in her home   Additional Occupational Profile Info/Pertinent History of Current Problem 90 degree hip flexion restriction   Left Upper Extremity (Weight-bearing Status) weight-bearing as tolerated (WBAT)   Right Upper Extremity (Weight-bearing Status) weight-bearing as tolerated (WBAT)   Left Lower Extremity (Weight-bearing Status) weight-bearing as tolerated (WBAT)   Right Lower Extremity (Weight-bearing Status) weight-bearing as tolerated (WBAT)   General Observations and Info Jeanna Smith is a 79 year old female who presents on 8/17/2024 with a fall resulting in left femur neck fracture with plan for surgical intervention in the morning.   Cognitive Status Examination   Orientation Status orientation to person, place and time   Visual Perception   Visual Impairment/Limitations corrective lenses full-time;corrective lenses for reading;corrective lenses for distance   Sensory   Sensory Quick Adds sensation intact   Pain Assessment   Patient Currently in Pain No   Range of Motion Comprehensive   General Range of Motion  bilateral upper extremity ROM WNL   Strength Comprehensive (MMT)   General Manual Muscle Testing (MMT) Assessment no strength deficits identified   Muscle Tone Assessment   Muscle Tone Quick Adds No deficits were identified   Coordination   Upper Extremity Coordination No deficits were identified   Bed Mobility   Bed Mobility supine-sit   Supine-Sit [place] (Bed Mobility) minimum assist (75% patient effort)   Assistive Device (Bed Mobility) bed rails   Transfers   Transfers sit-stand transfer;toilet transfer   Toilet Transfer   Type (Toilet Transfer) sit-stand;stand-sit   [place] Level (Toilet Transfer) modified independence;verbal cues   Assistive Device (Toilet Transfer) raised toilet seat;walker, front-wheeled;grab bars/safety frame   Clinical Impression   Criteria for Skilled Therapeutic Interventions Met (OT) Yes, treatment indicated   OT Diagnosis Impaired ADL status   Influenced by the following impairments pain, weakness, restrictions   OT Problem List-Impairments impacting ADL problems related to;activity tolerance impaired;mobility;pain;strength;post-surgical precautions   ADL comments/analysis 90 degree hip flexion retriction, use of abductor pillow   Assessment of Occupational Performance 5 or more Performance Deficits   Identified Performance Deficits dressing, bathing, ambulation, stairs, driving, home making   Planned Therapy Interventions (OT) ADL retraining   Intervention Comments instruction for ADL's with restrictions   Clinical Decision Making Complexity (OT) problem focused assessment/low complexity   Risk & Benefits of therapy have been explained evaluation/treatment results reviewed;care plan/treatment goals reviewed;risks/benefits reviewed;current/potential barriers reviewed;participants voiced agreement with care plan;participants included;patient   OT Total Evaluation Time   OT Eval, Low Complexity Minutes (29629) 10   OT Goals   Therapy Frequency (OT) 6 times/week   OT Predicted  Duration/Target Date for Goal Attainment 08/26/24   OT Goals Hygiene/Grooming;Lower Body Dressing;Toilet Transfer/Toileting   OT: Hygiene/Grooming modified independent;while standing   OT: Lower Body Dressing Modified independent;using adaptive equipment   OT: Toilet Transfer/Toileting Modified independent;cleaning and garment management;toilet transfer   Interventions   Interventions Quick Adds Self-Care/Home Management   Self-Care/Home Management   Self-Care/Home Mgmt/ADL, Compensatory, Meal Prep Minutes (24139) 8   Symptoms Noted During/After Treatment (Meal Preparation/Planning Training) none  (light headedness)   Treatment Detail/Skilled Intervention OT:  Pt provided with OT introduction and explanation of OT intervention.  PT providing instruction of precautions with written information and v/c.  Pt required min A to assist with left LE to slide in bed when going to the right and assist to lower the left leg.  Ambulation to /from BR with CGA and use FWW.  Toilet transfers with v/c to slide left LE forward.  Pt able to manage garments and complete viet care.  Pt left with PT to further ambulate   OT Discharge Planning   OT Plan LE dressing instruction with adaptive equipment, standing g/h   OT Discharge Recommendation (DC Rec) home with home care occupational therapy;Transitional Care Facility   OT Rationale for DC Rec Pt needing further instruction for LE dressing.  Pt does live alone and does not have help (per pt).  Pt is very active prior to injury.  Pt's bedroom is upstairs.   OT Brief overview of current status Pt is demonstrating bed mobility with min a, transfers with SBA, able to manage toileting activity with SBA.  Needs further instruction of restrictions   Total Session Time   Timed Code Treatment Minutes 8   Total Session Time (sum of timed and untimed services) 18

## 2024-08-19 NOTE — PROGRESS NOTES
"[facility] Orthopaedics Progress Note      Post-operative Day: 1 Day Post-Op    Procedure(s):  HEMIARTHROPLASTY, HIP, BIPOLAR, LEFT  Subjective:    Pt reports that the hip doesn't hurt at rest, but is painful with movement. She understands the plan for PT/OT asssessment today and is planning on going to TCU; she states that someone told her she would be leaving today and she does not want to go until tomorrow.     Chest pain, SOB:  no  Nausea, vomiting:  no  Lightheadedness, dizziness:  no  Neuro:  Patient denies new onset numbness or paresthesias      Objective:  Blood pressure (!) 143/66, pulse 67, temperature 98.1  F (36.7  C), temperature source Oral, resp. rate 16, height 1.676 m (5' 6\"), weight 67.3 kg (148 lb 5.9 oz), SpO2 93%.    Patient Vitals for the past 24 hrs:   BP Temp Temp src Pulse Resp SpO2 Height   08/19/24 0626 (!) 143/66 98.1  F (36.7  C) Oral 67 16 93 % --   08/19/24 0216 (!) 153/63 97.3  F (36.3  C) Oral 63 16 95 % --   08/18/24 2253 -- -- -- -- -- 96 % --   08/18/24 2234 -- -- -- -- -- -- 1.676 m (5' 6\")   08/18/24 2143 130/66 -- -- 59 14 98 % --   08/18/24 2057 (!) 141/71 -- -- 61 19 98 % --   08/18/24 1933 (!) 141/66 -- -- 76 -- -- --   08/18/24 1855 135/55 -- -- 72 15 97 % --   08/18/24 1818 (!) 169/76 98.1  F (36.7  C) Oral 75 16 94 % --   08/18/24 1745 (!) 158/81 -- -- 73 23 94 % --   08/18/24 1730 (!) 157/84 -- -- 68 13 98 % --   08/18/24 1715 (!) 158/81 -- -- -- 16 -- --   08/18/24 1708 (!) 145/85 97.1  F (36.2  C) Axillary 70 16 97 % --   08/18/24 1234 (!) 152/72 (!) 96.1  F (35.6  C) Oral 61 16 95 % --       Wt Readings from Last 4 Encounters:   08/18/24 67.3 kg (148 lb 5.9 oz)   10/30/18 73.8 kg (162 lb 11.2 oz)   11/05/09 66.2 kg (146 lb)       Gen: A&O x 3. NAD. Appears comfortable, eating breakfast in bed.   Wound status: Covered, Aquacel dressing is d/i. Mild drainage.   Circulation, motion and sensation: Dorsiflexion/plantarflexion intact and equal bilaterally; distal lower " extremity sensation is intact and equal bilaterally. Foot and toes are warm and well perfused.    Swelling: mild  Calf tenderness: calves are soft and non-tender bilaterally     Pertinent Labs   Lab Results: personally reviewed.     Recent Labs   Lab Test 08/19/24  0537 08/18/24  0513 08/17/24  1926 08/16/24  1816   WBC  --  7.9 8.7 9.7   HGB 10.0* 10.3* 9.9* 9.8*   HCT  --  31.4* 29.9* 29.9*   MCV  --  86 85 85   PLT  --  178 173 205   NA  --  137 134* 136       Plan:   Continue current cares and rehabilitation.  Anticoagulation protocol: Aspirin 81 mg BID  x 42  days  Pain medications:  oxycodone and tylenol  Weight bearing status:  WBAT  Disposition:  Discharge per medicine, plan for discharge to TCU when medically stable and pain is controlled, likely POD#2-3.               Report completed by:  Dayne Oden PA-C  Date: 8/19/2024  Time: 9:15 AM

## 2024-08-19 NOTE — PLAN OF CARE
"Pt A&Ox4. Able to make needs known. Denies pain at rest, which shoots up to 7-9/10 in hip (surgical incision) and back (chronic) when up. Able to ambulate to bathroom SBA1 GBWalker. No PIV access - provider aware & approved as patient is entirely on PO pain control.     Problem: Adult Inpatient Plan of Care  Goal: Patient-Specific Goal (Individualized)  Description: You can add care plan individualizations to a care plan. Examples of Individualization might be:  \"Parent requests to be called daily at 9am for status\", \"I have a hard time hearing out of my right ear\", or \"Do not touch me to wake me up as it startles  me\".  Outcome: Progressing     Problem: Adult Inpatient Plan of Care  Goal: Absence of Hospital-Acquired Illness or Injury  Outcome: Progressing  Intervention: Prevent and Manage VTE (Venous Thromboembolism) Risk  Recent Flowsheet Documentation  Taken 8/19/2024 0800 by Kenzie Osullivan RN  VTE Prevention/Management: SCDs on (sequential compression devices)     Problem: Adult Inpatient Plan of Care  Goal: Optimal Comfort and Wellbeing  Outcome: Progressing  Intervention: Monitor Pain and Promote Comfort  Recent Flowsheet Documentation  Taken 8/19/2024 0800 by Kenzie Osullivan RN  Pain Management Interventions: medication (see MAR)     Problem: Adult Inpatient Plan of Care  Goal: Readiness for Transition of Care  Outcome: Progressing     Problem: Risk for Delirium  Goal: Optimal Coping  Outcome: Progressing  Goal: Improved Behavioral Control  Outcome: Progressing  Goal: Improved Attention and Thought Clarity  Outcome: Progressing  Goal: Improved Sleep  Outcome: Progressing     Problem: Risk for Delirium  Goal: Improved Behavioral Control  Outcome: Progressing     Problem: Risk for Delirium  Goal: Improved Attention and Thought Clarity  Outcome: Progressing     Problem: Pain Acute  Goal: Optimal Pain Control and Function  Outcome: Progressing  Intervention: Develop Pain Management " Plan  Recent Flowsheet Documentation  Taken 8/19/2024 0800 by Kenzie Osullivan RN  Pain Management Interventions: medication (see MAR)     Problem: Fall Injury Risk  Goal: Absence of Fall and Fall-Related Injury  Outcome: Progressing     Problem: Infection  Goal: Absence of Infection Signs and Symptoms  Outcome: Progressing   Goal Outcome Evaluation:           Overall Patient Progress: improvingOverall Patient Progress: improving    Outcome Evaluation: Pt A&Ox4. Able to make needs known. Denies pain at rest, which shoots up to 7-9/10 in hip (surgical incision) and back (chronic) when up. Able to ambulate to bathroom SBA1 GBWalker.  No PIV access - provider aware & approved as patient is entirely on PO pain control.

## 2024-08-19 NOTE — PLAN OF CARE
Problem: Pain Acute  Goal: Optimal Pain Control and Function  Outcome: Progressing   Goal Outcome Evaluation:      Plan of Care Reviewed With: patient    Overall Patient Progress: improvingOverall Patient Progress: improving     Patient vital signs are at baseline: Yes  Patient able to ambulate as they were prior to admission or with assist devices provided by therapies during their stay:  No,  Reason:  Pt ambulates to BR w/ 1 A, belt & walker, but twice declined walking in alcocer due to pain.  Patient MUST void prior to discharge:  Yes  Patient able to tolerate oral intake:  Yes  Pain has adequate pain control using Oral analgesics:  Yes, with use of scheduled tylenol & prn oxycodone 5mg.  Does patient have an identified :  No,  Reason:  Pt plans on going to TCU  Has goal D/C date and time been discussed with patient:  Yes

## 2024-08-19 NOTE — PROGRESS NOTES
[facility] Medicine Progress Note  Date of Service: 08/19/2024    Assessment & Plan   Jeanna Smith is a 79 year old female who presents on 8/17/2024 with a fall resulting in left femur neck fracture with plan for surgical intervention in the morning.    Closed displaced fracture of left femoral neck (H)  Fall at home, initial encounter    Patient presented with left hip pain after a fall on 8/17. She was using her walker, felt weak and lightheaded, and lost control of her walker falling on her left hip. She hit her head and had neck pain, but CT head and cervical spine negative.     X-ray femur/pelvis demonstrates fracture of the left femoral neck without evidence for intertrochanteric extension. Right hip negative for fracture.    Anesthesia placed a peripheral nerve block for pain control in the ER. Orthopedic surgery was consulted by the ED provider and she is s/p left hip hemiarthroplasty on 8/18.     - Orthopedic surgery consulted  - PT/OT/SW consulted. Pt requesting TCU    UTI with hematuria    The patient was seen 8/16 in the ED for evaluation of diarrhea and was found to have UTI, she was sent home on PO Bactrim.      Urine culture showed E. coli that is resistant to Bactrim, and ampicillin, sensitive to Macrobid and Cipro.    Patient has known history of allergy to penicillin, she declined IV antibiotics and would like oral instead.  She tolerated Macrobid in the past.  - PO Macrobid 100 BID    Hypothyroidism  Continue PTA [place] Thyroid    Anemia, chronic, normocytic  Appears to be chronic normocytic anemia with baseline hemoglobin 9-9.9.  Hemoglobin admission 9.9 at baseline. No current concern for bleeding.  - Continue PTA iron  - Hgb in am           Diet: Advance Diet as Tolerated: Regular Diet Adult    DVT Prophylaxis: Pneumatic Compression Devices and Aspirin 81  Hopkins Catheter: Not present  Lines: None     Code Status: Full Code       Discussion: Awaiting  "TCU placement. Pt adamant she isn't leaving today, and said \"probably not tomorrow\".    Disposition: Anticipate discharge 8/20/24     Attestation:  I have reviewed today's vital signs, notes, medications, labs and imaging.    I have discussed, or will be discussing, the patient with hospitalist physician, Dr. Shane Paez, GIN       Interval History   NAEO. Afebrile. No pain at rest, but significant pain with ambulation and weight bearing. Had some numbness in LLE toes that resolved. Able to eat and drink. No issues with urination.     Pt states she's not going to discharge today, and \"probably not tomorrow\". She states this is due to feeling safer in the hospital. She's requesting a TCU. She understands the purpose of a TCU.     Physical Exam   Temp:  [96.1  F (35.6  C)-98.1  F (36.7  C)] 98.1  F (36.7  C)  Pulse:  [59-76] 67  Resp:  [13-23] 16  BP: (130-169)/(55-85) 143/66  SpO2:  [93 %-98 %] 93 %    Weights:   Vitals:    08/17/24 1808 08/18/24 0600   Weight: 63.5 kg (140 lb) 67.3 kg (148 lb 5.9 oz)    Body mass index is 23.95 kg/m .    Constitutional: Alert, oriented, cooperative, in no acute distress, appears nontoxic.  HENT: Oropharynx is clear. No evidence of cranial trauma. Normocephalic. Eyes anicteric.   Cardiovascular: Regular rate and rhythm, normal S1 and S2, and no murmur noted. Good peripheral pulses in feet bilaterally. 1+ LLE edema without calf tenderness.  Respiratory: Clear to auscultation bilaterally with no adventitious breath sounds.   GI: Soft, non-tender, normal bowel sounds, no hepatomegaly, no masses.  Musculoskeletal: Normal muscle bulk and tone.  Skin: Warm and dry, no rashes.   Psych: Normal affect and speech.  Neurologic: Cranial nerves 2-12 are grossly intact.       Data   Recent Labs   Lab 08/19/24  0537 08/18/24  0513 08/17/24  1926 08/16/24  1816   WBC  --  7.9 8.7 9.7   HGB 10.0* 10.3* 9.9* 9.8*   MCV  --  86 85 85   PLT  --  178 173 205   NA  --  137 134* 136 "   POTASSIUM 4.3 4.2 4.0 2.9*   CHLORIDE  --  104 101 104   CO2  --  25 25 23   BUN  --  11.9 12.7 13.6   CR  --  0.98* 0.98* 0.82   ANIONGAP  --  8 8 9   CHANNING  --  9.0 8.5* 7.8*   * 157* 154* 145*   ALBUMIN  --   --  3.2* 3.1*   PROTTOTAL  --   --  5.3* 5.1*   BILITOTAL  --   --  0.4 0.6   ALKPHOS  --   --  73 68   ALT  --   --  20 12   AST  --   --  22 17       Recent Labs   Lab 08/19/24  0537 08/18/24  0513 08/17/24  1926 08/16/24  1816   * 157* 154* 145*        Unresulted Labs Ordered in the Past 30 Days of this Admission       No orders found from 7/18/2024 to 8/18/2024.             Imaging  Recent Results (from the past 24 hour(s))   X-ray Pelvis w/ unilateral hip left    Narrative    EXAM: XR PELVIS AND HIP LEFT 1 VIEW  LOCATION: [facility]  DATE: 8/18/2024    INDICATION: post surgical hip hemiarthroplasty  COMPARISON: None.      Impression    IMPRESSION: Postoperative changes left hip hemiarthroplasty. Components appear well seated. Postprocedural air around the left hip. Right hip negative for fracture with mild degenerative change. Visualized pelvis negative for fracture. Diffuse   demineralization.        I reviewed all new labs and imaging results over the last 24 hours. I personally reviewed no images or EKG's today.    Medications   Current Facility-Administered Medications   Medication Dose Route Frequency Provider Last Rate Last Admin    lactated ringers infusion   Intravenous Continuous Rashard Barajas MD   Stopped at 08/19/24 0231    sodium chloride 0.9 % infusion  1,000 mL Intravenous Continuous Marcus Ceron  mL/hr at 08/18/24 0838 1,000 mL at 08/18/24 0838     Current Facility-Administered Medications   Medication Dose Route Frequency Provider Last Rate Last Admin    acetaminophen (TYLENOL) tablet 975 mg  975 mg Oral Q8H Rashard Barajas MD   975 mg at 08/19/24 0506    aspirin EC tablet 81 mg  81 mg Oral BID Rashard Barajas MD   81  mg at 08/18/24 1936    buPROPion (WELLBUTRIN XL) 24 hr tablet 150 mg  150 mg Oral Daily Alma Levy MD   150 mg at 08/18/24 0836    carboxymethylcellulose PF (REFRESH PLUS) 0.5 % ophthalmic solution 1 drop  1 drop Both Eyes BID Alma Levy MD   1 drop at 08/18/24 1937    [Held by provider] ferrous sulfate (FEROSUL) tablet 325 mg  325 mg Oral QPM Alma Levy MD        nitroFURantoin macrocrystal-monohydrate (MACROBID) capsule 100 mg  100 mg Oral Q12H [facility]ASHANDA (08/20) Alma Levy MD   100 mg at 08/18/24 1935    pantoprazole (PROTONIX) EC tablet 40 mg  40 mg Oral Daily Alma Levy MD   40 mg at 08/18/24 0833    polyethylene glycol (MIRALAX) Packet 17 g  17 g Oral Daily Rashard Barajas MD        senna-docusate (SENOKOT-S/PERICOLACE) 8.6-50 MG per tablet 1 tablet  1 tablet Oral BID Rashard Barajas MD        sodium chloride (PF) 0.9% PF flush 3 mL  3 mL Intracatheter Q8H Alma Levy MD   3 mL at 08/18/24 0850    sodium chloride (PF) 0.9% PF flush 3 mL  3 mL Intracatheter Q8H Rashard Barajas MD   3 mL at 08/19/24 0614    thyroid (ARMOUR) tablet 60 mg  60 mg Oral Daily Alma Levy MD Carter Kindschy, PA-C

## 2024-08-20 LAB
GLUCOSE SERPL-MCNC: 104 MG/DL (ref 70–99)
HGB BLD-MCNC: 9.7 G/DL (ref 11.7–15.7)
MAGNESIUM SERPL-MCNC: 2 MG/DL (ref 1.7–2.3)
PHOSPHATE SERPL-MCNC: 2.6 MG/DL (ref 2.5–4.5)
POTASSIUM SERPL-SCNC: 4.1 MMOL/L (ref 3.4–5.3)

## 2024-08-20 PROCEDURE — 82947 ASSAY GLUCOSE BLOOD QUANT: CPT | Performed by: INTERNAL MEDICINE

## 2024-08-20 PROCEDURE — 36415 COLL VENOUS BLD VENIPUNCTURE: CPT | Performed by: ORTHOPAEDIC SURGERY

## 2024-08-20 PROCEDURE — 120N000001 HC R&B MED SURG/OB

## 2024-08-20 PROCEDURE — 99232 SBSQ HOSP IP/OBS MODERATE 35: CPT | Performed by: INTERNAL MEDICINE

## 2024-08-20 PROCEDURE — 250N000013 HC RX MED GY IP 250 OP 250 PS 637: Performed by: FAMILY MEDICINE

## 2024-08-20 PROCEDURE — 250N000013 HC RX MED GY IP 250 OP 250 PS 637: Performed by: ORTHOPAEDIC SURGERY

## 2024-08-20 PROCEDURE — 83735 ASSAY OF MAGNESIUM: CPT | Performed by: INTERNAL MEDICINE

## 2024-08-20 PROCEDURE — 84100 ASSAY OF PHOSPHORUS: CPT | Performed by: INTERNAL MEDICINE

## 2024-08-20 PROCEDURE — 84132 ASSAY OF SERUM POTASSIUM: CPT | Performed by: INTERNAL MEDICINE

## 2024-08-20 PROCEDURE — 85018 HEMOGLOBIN: CPT | Performed by: ORTHOPAEDIC SURGERY

## 2024-08-20 RX ADMIN — OXYCODONE HYDROCHLORIDE 5 MG: 5 TABLET ORAL at 16:23

## 2024-08-20 RX ADMIN — OXYCODONE HYDROCHLORIDE 10 MG: 5 TABLET ORAL at 07:35

## 2024-08-20 RX ADMIN — CARBOXYMETHYLCELLULOSE SODIUM 1 DROP: 0.5 SOLUTION/ DROPS OPHTHALMIC at 19:59

## 2024-08-20 RX ADMIN — NITROFURANTOIN (MONOHYDRATE/MACROCRYSTALS) 100 MG: 75; 25 CAPSULE ORAL at 19:58

## 2024-08-20 RX ADMIN — OXYCODONE HYDROCHLORIDE 5 MG: 5 TABLET ORAL at 19:57

## 2024-08-20 RX ADMIN — NITROFURANTOIN (MONOHYDRATE/MACROCRYSTALS) 100 MG: 75; 25 CAPSULE ORAL at 07:35

## 2024-08-20 RX ADMIN — BUPROPION HYDROCHLORIDE 150 MG: 150 TABLET, FILM COATED, EXTENDED RELEASE ORAL at 07:35

## 2024-08-20 RX ADMIN — SENNOSIDES AND DOCUSATE SODIUM 1 TABLET: 50; 8.6 TABLET ORAL at 07:35

## 2024-08-20 RX ADMIN — ACETAMINOPHEN 975 MG: 325 TABLET, FILM COATED ORAL at 05:44

## 2024-08-20 RX ADMIN — THYROID, PORCINE 60 MG: 30 TABLET ORAL at 07:36

## 2024-08-20 RX ADMIN — ASPIRIN 81 MG: 81 TABLET, COATED ORAL at 19:58

## 2024-08-20 RX ADMIN — PANTOPRAZOLE SODIUM 40 MG: 40 TABLET, DELAYED RELEASE ORAL at 07:35

## 2024-08-20 RX ADMIN — OXYCODONE HYDROCHLORIDE 10 MG: 5 TABLET ORAL at 02:25

## 2024-08-20 RX ADMIN — CARBOXYMETHYLCELLULOSE SODIUM 1 DROP: 0.5 SOLUTION/ DROPS OPHTHALMIC at 07:35

## 2024-08-20 RX ADMIN — OXYCODONE HYDROCHLORIDE 5 MG: 5 TABLET ORAL at 11:46

## 2024-08-20 RX ADMIN — ASPIRIN 81 MG: 81 TABLET, COATED ORAL at 07:35

## 2024-08-20 RX ADMIN — SENNOSIDES AND DOCUSATE SODIUM 1 TABLET: 50; 8.6 TABLET ORAL at 19:58

## 2024-08-20 RX ADMIN — ACETAMINOPHEN 975 MG: 325 TABLET, FILM COATED ORAL at 13:28

## 2024-08-20 RX ADMIN — ACETAMINOPHEN 975 MG: 325 TABLET, FILM COATED ORAL at 21:46

## 2024-08-20 ASSESSMENT — ACTIVITIES OF DAILY LIVING (ADL)
ADLS_ACUITY_SCORE: 27
ADLS_ACUITY_SCORE: 26
ADLS_ACUITY_SCORE: 27
ADLS_ACUITY_SCORE: 26
ADLS_ACUITY_SCORE: 26
ADLS_ACUITY_SCORE: 27
ADLS_ACUITY_SCORE: 26
DEPENDENT_IADLS:: INDEPENDENT
ADLS_ACUITY_SCORE: 27
ADLS_ACUITY_SCORE: 26
ADLS_ACUITY_SCORE: 26
ADLS_ACUITY_SCORE: 27
ADLS_ACUITY_SCORE: 27

## 2024-08-20 NOTE — PLAN OF CARE
"  Problem: Pain Acute  Goal: Optimal Pain Control and Function  Outcome: Not Progressing  Intervention: Develop Pain Management Plan  Recent Flowsheet Documentation  Taken 8/19/2024 1728 by Maureen Washington RN  Pain Management Interventions: medication (see MAR)  Intervention: Prevent or Manage Pain  Recent Flowsheet Documentation  Taken 8/19/2024 1845 by Maureen Washington, RN  Medication Review/Management: medications reviewed  Intervention: Optimize Psychosocial Wellbeing  Recent Flowsheet Documentation  Taken 8/19/2024 1845 by Maureen Washington, RN  Supportive Measures: active listening utilized     Problem: Adult Inpatient Plan of Care  Goal: Plan of Care Review  Description: The Plan of Care Review/Shift note should be completed every shift.  The Outcome Evaluation is a brief statement about your assessment that the patient is improving, declining, or no change.  This information will be displayed automatically on your shift  note.  Outcome: Progressing  Goal: Patient-Specific Goal (Individualized)  Description: You can add care plan individualizations to a care plan. Examples of Individualization might be:  \"Parent requests to be called daily at 9am for status\", \"I have a hard time hearing out of my right ear\", or \"Do not touch me to wake me up as it startles  me\".  Outcome: Progressing  Goal: Absence of Hospital-Acquired Illness or Injury  Outcome: Progressing  Intervention: Identify and Manage Fall Risk  Recent Flowsheet Documentation  Taken 8/19/2024 1845 by Maureen Washington RN  Safety Promotion/Fall Prevention:   activity supervised   assistive device/personal items within reach   clutter free environment maintained   mobility aid in reach   nonskid shoes/slippers when out of bed   room door open   room near nurse's station   supervised activity  Intervention: Prevent and Manage VTE (Venous Thromboembolism) Risk  Recent Flowsheet Documentation  Taken 8/19/2024 1845 by Maureen Washington, RN  VTE " Prevention/Management: SCDs on (sequential compression devices)  Intervention: Prevent Infection  Recent Flowsheet Documentation  Taken 8/19/2024 1845 by Maureen Washington, RN  Infection Prevention:   rest/sleep promoted   single patient room provided   hand hygiene promoted  Goal: Optimal Comfort and Wellbeing  Outcome: Progressing  Intervention: Monitor Pain and Promote Comfort  Recent Flowsheet Documentation  Taken 8/19/2024 1728 by Maureen Washington, RN  Pain Management Interventions: medication (see MAR)  Intervention: Provide Person-Centered Care  Recent Flowsheet Documentation  Taken 8/19/2024 1845 by Maureen Washington RN  Trust Relationship/Rapport:   care explained   choices provided  Goal: Readiness for Transition of Care  Outcome: Progressing     Problem: Risk for Delirium  Goal: Optimal Coping  Outcome: Progressing  Intervention: Optimize Psychosocial Adjustment to Delirium  Recent Flowsheet Documentation  Taken 8/19/2024 1845 by Maureen Washington, RN  Supportive Measures: active listening utilized  Goal: Improved Behavioral Control  Outcome: Progressing  Intervention: Minimize Safety Risk  Recent Flowsheet Documentation  Taken 8/19/2024 1845 by Maureen Washington, RN  Communication Enhancement Strategies: call light answered in person  Enhanced Safety Measures: pain management  Trust Relationship/Rapport:   care explained   choices provided  Goal: Improved Attention and Thought Clarity  Outcome: Progressing  Goal: Improved Sleep  Outcome: Progressing     Problem: Fall Injury Risk  Goal: Absence of Fall and Fall-Related Injury  Outcome: Progressing  Intervention: Identify and Manage Contributors  Recent Flowsheet Documentation  Taken 8/19/2024 1845 by Maureen Washington, RN  Medication Review/Management: medications reviewed  Intervention: Promote Injury-Free Environment  Recent Flowsheet Documentation  Taken 8/19/2024 1845 by Maureen Washington, RN  Safety Promotion/Fall Prevention:   activity  supervised   assistive device/personal items within reach   clutter free environment maintained   mobility aid in reach   nonskid shoes/slippers when out of bed   room door open   room near nurse's station   supervised activity     Problem: Infection  Goal: Absence of Infection Signs and Symptoms  Outcome: Progressing   Goal Outcome Evaluation:       Alert and oriented x4. Calls appropriately and makes needs known. Stated pain 8/10 in the left hip when ambulating, but 0/10 at rest. Oxycodone given for pain management. Ice pack applied. Aquacel in place and remains clean dry and intact. Fever of 100.4 noted, telemed consulted and ordered a one time dose of ibuprofen, current temp is 98.7

## 2024-08-20 NOTE — PROGRESS NOTES
[facility] Medicine Progress Note  Date of Service: 08/20/2024    Assessment & Plan   Jeanna Smith is a 79 year old female who presents on 8/17/2024 with a fall resulting in left femur neck fracture with plan for surgical intervention in the morning.    Closed displaced fracture of left femoral neck (H)  Fall at home, initial encounter    Patient presented with left hip pain after a fall on 8/17. She was using her walker, felt weak and lightheaded, and lost control of her walker falling on her left hip. She hit her head and had neck pain, but CT head and cervical spine negative.     X-ray femur/pelvis demonstrates fracture of the left femoral neck without evidence for intertrochanteric extension. Right hip negative for fracture.    Anesthesia placed a peripheral nerve block for pain control in the ER. Orthopedic surgery was consulted by the ED provider and she is s/p left hip hemiarthroplasty on 8/18.     Post op day #2. Pain control improving.    - continue pain control per ortho service   - physical therapy, occupational therapy     UTI with hematuria    The patient was seen 8/16 in the ED for evaluation of diarrhea and was found to have UTI, she was sent home on PO Bactrim.      Urine culture showed E. coli that is resistant to Bactrim, and ampicillin, sensitive to Macrobid and Cipro.    Patient has known history of allergy to penicillin, she declined IV antibiotics and would like oral instead.  She tolerated Macrobid in the past.   - continue nitrofurantoin 100 BID    Hypothyroidism   - Continue PTA [place] Thyroid    Anemia, chronic, normocytic    Appears to be chronic normocytic anemia with baseline hemoglobin 9-9.9.    Hemoglobin admission 9.9 at baseline. No current concern for bleeding. Hgb 9.7 post op.     Diet: Advance Diet as Tolerated: Regular Diet Adult    DVT Prophylaxis: Aspirin 81mg BID  Hopkins Catheter: Not present  Lines: None     Code Status: Full Code   "     Discussion/Disposition: Improving. May need TCU. Reeval tomorrow.     Medically Ready for Discharge: Anticipated Tomorrow         Medical Decision Making       25 MINUTES SPENT BY ME on the date of service doing chart review, history, exam, documentation & further activities per the note.        Benja Lynn MD  [facility] Medicine    [facility]  Securely message with [employer] (more info)  Text page via [employer] Paging/Directory       Interval History   Pain improving but feels stiff and sore all over from her \"hard\" fall. Feels she will need TCU on discharge. No chest pain, shortness of breath.     Physical Exam   Temp:  [97.1  F (36.2  C)-100.4  F (38  C)] 97.1  F (36.2  C)  Pulse:  [72-73] 72  Resp:  [16] 16  BP: (123-161)/(62-69) 151/62  SpO2:  [94 %-96 %] 96 %    Weights:   Vitals:    08/17/24 1808 08/18/24 0600   Weight: 63.5 kg (140 lb) 67.3 kg (148 lb 5.9 oz)    Body mass index is 23.95 kg/m .    Constitutional: alert and oriented, a little brighter today, NAD  CV: Regular, no edema  Respiratory: CTA bilaterally  GI: Soft, non-tender, bowel sounds normal  Skin: Warm and dry    Data   Recent Labs   Lab 08/20/24  0538 08/19/24  0537 08/18/24  0513 08/17/24  1926 08/16/24  1816   WBC  --   --  7.9 8.7 9.7   HGB 9.7* 10.0* 10.3* 9.9* 9.8*   MCV  --   --  86 85 85   PLT  --   --  178 173 205   NA  --   --  137 134* 136   POTASSIUM 4.1 4.3 4.2 4.0 2.9*   CHLORIDE  --   --  104 101 104   CO2  --   --  25 25 23   BUN  --   --  11.9 12.7 13.6   CR  --   --  0.98* 0.98* 0.82   ANIONGAP  --   --  8 8 9   CHANNING  --   --  9.0 8.5* 7.8*   * 127* 157* 154* 145*   ALBUMIN  --   --   --  3.2* 3.1*   PROTTOTAL  --   --   --  5.3* 5.1*   BILITOTAL  --   --   --  0.4 0.6   ALKPHOS  --   --   --  73 68   ALT  --   --   --  20 12   AST  --   --   --  22 17       Recent Labs   Lab 08/20/24  0538 08/19/24  0537 08/18/24  0513 08/17/24  1926 08/16/24  1816   * 127* 157* 154* 145*    "     Unresulted Labs Ordered in the Past 30 Days of this Admission       No orders found from 7/18/2024 to 8/18/2024.             Imaging: No results found for this or any previous visit (from the past 24 hour(s)).     I reviewed all new labs and imaging results over the last 24 hours. I personally reviewed no images or EKG's today.    Medications   Current Facility-Administered Medications   Medication Dose Route Frequency Provider Last Rate Last Admin    lactated ringers infusion   Intravenous Continuous Rashard Barajas MD   Stopped at 08/19/24 0231     Current Facility-Administered Medications   Medication Dose Route Frequency Provider Last Rate Last Admin    acetaminophen (TYLENOL) tablet 975 mg  975 mg Oral Q8H Rashard Barajas MD   975 mg at 08/20/24 1328    aspirin EC tablet 81 mg  81 mg Oral BID Rashard Barajas MD   81 mg at 08/20/24 0735    buPROPion (WELLBUTRIN XL) 24 hr tablet 150 mg  150 mg Oral Daily Alma Levy MD   150 mg at 08/20/24 0735    carboxymethylcellulose PF (REFRESH PLUS) 0.5 % ophthalmic solution 1 drop  1 drop Both Eyes BID Alma Levy MD   1 drop at 08/20/24 0735    [Held by provider] ferrous sulfate (FEROSUL) tablet 325 mg  325 mg Oral QPM Alma Levy MD        nitroFURantoin macrocrystal-monohydrate (MACROBID) capsule 100 mg  100 mg Oral Q12H [facility] (08/20) Alma Levy MD   100 mg at 08/20/24 0735    pantoprazole (PROTONIX) EC tablet 40 mg  40 mg Oral Daily Alma Levy MD   40 mg at 08/20/24 0735    polyethylene glycol (MIRALAX) Packet 17 g  17 g Oral Daily Rashard Barajas MD        senna-docusate (SENOKOT-S/PERICOLACE) 8.6-50 MG per tablet 1 tablet  1 tablet Oral BID Rashard Barajas MD   1 tablet at 08/20/24 0735    sodium chloride (PF) 0.9% PF flush 3 mL  3 mL Intracatheter Q8H Rashard Barajas MD   3 mL at 08/19/24 0614    thyroid (ARMOUR) tablet 60 mg  60 mg Oral Daily Alma Levy MD   60 mg at 08/20/24 0736       Benja Lynn,  [facility]

## 2024-08-20 NOTE — PLAN OF CARE
Problem: Pain Acute  Goal: Optimal Pain Control and Function  Outcome: Progressing  Intervention: Develop Pain Management Plan  Recent Flowsheet Documentation  Taken 8/20/2024 1146 by Reyna Bailey RN  Pain Management Interventions: medication (see MAR)  Taken 8/20/2024 0735 by Reyna Bailey RN  Pain Management Interventions: medication (see MAR)  Intervention: Prevent or Manage Pain  Recent Flowsheet Documentation  Taken 8/20/2024 0735 by Reyna Bailey RN  Sleep/Rest Enhancement: awakenings minimized  Medication Review/Management: medications reviewed  Intervention: Optimize Psychosocial Wellbeing  Recent Flowsheet Documentation  Taken 8/20/2024 0735 by Reyna Bailey RN  Supportive Measures: active listening utilized   Goal Outcome Evaluation: improving           Alert and oriented. Up with assist of one, walker and gait belt.   Pain controlled with scheduled Tylenol and 5 mg of as needed Oxycodone. Patient reports increased pain with movement.

## 2024-08-20 NOTE — PLAN OF CARE
Goal Outcome Evaluation:    Plan of Care Reviewed With: patient    Overall Patient Progress: improving    Outcome Evaluation: Patient resting comfortably during the night. Managing hip pain w/ oral analgesics and ice. Up w/A1-SBA, gait belt, and walker. Replacement protocol levels all WDL - am rechecks ordered.      Problem: Adult Inpatient Plan of Care  Goal: Optimal Comfort and Wellbeing  Outcome: Progressing  Intervention: Monitor Pain and Promote Comfort  Flowsheets (Taken 8/20/2024 0334)  Pain Management Interventions:   medication (see MAR)   care clustered   cold applied   quiet environment facilitated   rest  Intervention: Provide Person-Centered Care  Flowsheets (Taken 8/20/2024 0334)  Trust Relationship/Rapport:   care explained   choices provided   questions answered   questions encouraged   emotional support provided   thoughts/feelings acknowledged     Problem: Pain Acute  Goal: Optimal Pain Control and Function  Outcome: Progressing  Intervention: Develop Pain Management Plan  Flowsheets (Taken 8/20/2024 0334)  Pain Management Interventions:   medication (see MAR)   care clustered   cold applied   quiet environment facilitated   rest  Intervention: Prevent or Manage Pain  Flowsheets  Taken 8/20/2024 0334  Sensory Stimulation Regulation:   lighting decreased   care clustered  Sleep/Rest Enhancement: awakenings minimized  Bowel Elimination Promotion: adequate fluid intake promoted  Medication Review/Management: medications reviewed  Taken 8/20/2024 0100  Medication Review/Management: medications reviewed     Problem: Fall Injury Risk  Goal: Absence of Fall and Fall-Related Injury  Intervention: Promote Injury-Free Environment  Flowsheets  Taken 8/20/2024 0334  Safety Promotion/Fall Prevention:   activity supervised   clutter free environment maintained   nonskid shoes/slippers when out of bed   patient and family education   assistive device/personal items within reach  Taken 8/20/2024 0100  Safety  Promotion/Fall Prevention:   activity supervised   clutter free environment maintained   nonskid shoes/slippers when out of bed   patient and family education   assistive device/personal items within reach

## 2024-08-20 NOTE — PROGRESS NOTES
"[facility] Orthopaedics Progress Note      Post-operative Day: 2 Days Post-Op    Procedure(s):  HEMIARTHROPLASTY, HIP, BIPOLAR, LEFT  Subjective:    Pt reports that her pain is better today, no issues. She was able to get to the bathroom and back with therapy.     Chest pain, SOB:  no  Nausea, vomiting:  no  Lightheadedness, dizziness:  no  Neuro:  Patient denies new onset numbness or paresthesias      Objective:  Blood pressure (!) 161/69, pulse 72, temperature 97.6  F (36.4  C), temperature source Oral, resp. rate 16, height 1.676 m (5' 6\"), weight 67.3 kg (148 lb 5.9 oz), SpO2 95%.    Patient Vitals for the past 24 hrs:   BP Temp Temp src Pulse Resp SpO2   08/20/24 0719 (!) 161/69 97.6  F (36.4  C) Oral 72 16 95 %   08/19/24 2320 136/67 98.5  F (36.9  C) Oral 73 16 94 %   08/19/24 2228 -- 98.7  F (37.1  C) Oral -- -- --   08/19/24 2102 123/65 100.4  F (38  C) Oral -- 16 95 %   08/19/24 1602 (!) 160/70 98.6  F (37  C) Oral 77 18 94 %   08/19/24 1147 (!) 141/71 -- -- 65 -- --       Wt Readings from Last 4 Encounters:   08/18/24 67.3 kg (148 lb 5.9 oz)   10/30/18 73.8 kg (162 lb 11.2 oz)   11/05/09 66.2 kg (146 lb)       Gen: A&O x 3. NAD.   Wound status: Covered, Aquacel dressing is d/i. Slight bloody drainage unchanged.  Circulation, motion and sensation: Dorsiflexion/plantarflexion intact and equal bilaterally; distal lower extremity sensation is intact and equal bilaterally. Foot and toes are warm and well perfused.    Swelling: mild  Calf tenderness: calves are soft bilaterally, mild TTP on the left. Negative Anil's.     Pertinent Labs   Lab Results: personally reviewed.     Recent Labs   Lab Test 08/20/24  0538 08/19/24  0537 08/18/24  0513 08/17/24  1926 08/16/24  1816   WBC  --   --  7.9 8.7 9.7   HGB 9.7* 10.0* 10.3* 9.9* 9.8*   HCT  --   --  31.4* 29.9* 29.9*   MCV  --   --  86 85 85   PLT  --   --  178 173 205   NA  --   --  137 134* 136       Plan:   Continue current cares and " rehabilitation.  Anticoagulation protocol: Aspirin 81 mg BID  x 42  days  Pain medications:  oxycodone and tylenol  Weight bearing status:  WBAT  Disposition:  Orthopedically stable. Discharge per medicine, to TCU when stable and bed available.              Report completed by:  Dayne Oden PA-C  Date: 8/20/2024  Time: 11:08 AM

## 2024-08-20 NOTE — CONSULTS
Care Management Note:    Care Management team received referral from Ortho team to assist pt with discharge planning services post surgical services.    Per IDT rounds, EMR review, and/or discussion with PT/OT staff, it has been determined that pt will benefit from TCU placement. Pt expresses strong desire for TCU.      Pt states she will be discharging to a TCU but not until Thursday.   CM deferred discharge date to the MD.     Pt reports she has 17 stairs in home and is not comfortable return back home on discharge.     Requested TCU referrals be sent to:    1) [facility] (Phone: [phone] Fax: [phone])   2) [facility] (RN report: [phone] fax: [phone])   2) [facility] Main Phone:[phone] Admissions Phone:[phone] Fax: [phone])       States her family will provide transportation.     PLAN: CM will update once an acceptance to TCU has been received.     JESUS MANUEL Menon  [facility] [phone]   [facility] [place]  [phone]

## 2024-08-21 ENCOUNTER — APPOINTMENT (OUTPATIENT)
Dept: OCCUPATIONAL THERAPY | Facility: CLINIC | Age: 79
DRG: 522 | End: 2024-08-21
Payer: MEDICARE

## 2024-08-21 ENCOUNTER — DOCUMENTATION ONLY (OUTPATIENT)
Dept: GERIATRICS | Facility: CLINIC | Age: 79
End: 2024-08-21
Payer: MEDICARE

## 2024-08-21 ENCOUNTER — APPOINTMENT (OUTPATIENT)
Dept: PHYSICAL THERAPY | Facility: CLINIC | Age: 79
DRG: 522 | End: 2024-08-21
Payer: MEDICARE

## 2024-08-21 VITALS
OXYGEN SATURATION: 96 % | HEART RATE: 79 BPM | DIASTOLIC BLOOD PRESSURE: 75 MMHG | RESPIRATION RATE: 18 BRPM | BODY MASS INDEX: 23.84 KG/M2 | HEIGHT: 66 IN | WEIGHT: 148.37 LBS | SYSTOLIC BLOOD PRESSURE: 163 MMHG | TEMPERATURE: 97.8 F

## 2024-08-21 PROCEDURE — 99238 HOSP IP/OBS DSCHRG MGMT 30/<: CPT | Performed by: INTERNAL MEDICINE

## 2024-08-21 PROCEDURE — 250N000013 HC RX MED GY IP 250 OP 250 PS 637: Performed by: ORTHOPAEDIC SURGERY

## 2024-08-21 PROCEDURE — 97116 GAIT TRAINING THERAPY: CPT | Mod: GP | Performed by: PHYSICAL THERAPIST

## 2024-08-21 PROCEDURE — 97535 SELF CARE MNGMENT TRAINING: CPT | Mod: GO

## 2024-08-21 PROCEDURE — 250N000013 HC RX MED GY IP 250 OP 250 PS 637: Performed by: FAMILY MEDICINE

## 2024-08-21 PROCEDURE — 97110 THERAPEUTIC EXERCISES: CPT | Mod: GP | Performed by: PHYSICAL THERAPIST

## 2024-08-21 RX ORDER — AMOXICILLIN 250 MG
1 CAPSULE ORAL 2 TIMES DAILY PRN
DISCHARGE
Start: 2024-08-21

## 2024-08-21 RX ORDER — ACETAMINOPHEN 325 MG/1
975 TABLET ORAL EVERY 8 HOURS
DISCHARGE
Start: 2024-08-21

## 2024-08-21 RX ORDER — NITROFURANTOIN 25; 75 MG/1; MG/1
100 CAPSULE ORAL EVERY 12 HOURS
DISCHARGE
Start: 2024-08-21 | End: 2024-08-26

## 2024-08-21 RX ORDER — FERROUS SULFATE 324(65)MG
1 TABLET, DELAYED RELEASE (ENTERIC COATED) ORAL DAILY
COMMUNITY

## 2024-08-21 RX ORDER — OXYCODONE HYDROCHLORIDE 5 MG/1
5-10 TABLET ORAL EVERY 4 HOURS PRN
Qty: 26 TABLET | Refills: 0 | Status: SHIPPED | OUTPATIENT
Start: 2024-08-21 | End: 2024-08-26

## 2024-08-21 RX ADMIN — ACETAMINOPHEN 975 MG: 325 TABLET, FILM COATED ORAL at 05:20

## 2024-08-21 RX ADMIN — ACETAMINOPHEN 975 MG: 325 TABLET, FILM COATED ORAL at 12:37

## 2024-08-21 RX ADMIN — THYROID, PORCINE 60 MG: 30 TABLET ORAL at 07:44

## 2024-08-21 RX ADMIN — OXYCODONE HYDROCHLORIDE 5 MG: 5 TABLET ORAL at 02:15

## 2024-08-21 RX ADMIN — NITROFURANTOIN (MONOHYDRATE/MACROCRYSTALS) 100 MG: 75; 25 CAPSULE ORAL at 07:43

## 2024-08-21 RX ADMIN — SENNOSIDES AND DOCUSATE SODIUM 1 TABLET: 50; 8.6 TABLET ORAL at 07:43

## 2024-08-21 RX ADMIN — BUPROPION HYDROCHLORIDE 150 MG: 150 TABLET, FILM COATED, EXTENDED RELEASE ORAL at 07:43

## 2024-08-21 RX ADMIN — OXYCODONE HYDROCHLORIDE 5 MG: 5 TABLET ORAL at 12:37

## 2024-08-21 RX ADMIN — CARBOXYMETHYLCELLULOSE SODIUM 1 DROP: 0.5 SOLUTION/ DROPS OPHTHALMIC at 07:43

## 2024-08-21 RX ADMIN — OXYCODONE HYDROCHLORIDE 5 MG: 5 TABLET ORAL at 08:35

## 2024-08-21 RX ADMIN — PANTOPRAZOLE SODIUM 40 MG: 40 TABLET, DELAYED RELEASE ORAL at 07:43

## 2024-08-21 RX ADMIN — ASPIRIN 81 MG: 81 TABLET, COATED ORAL at 07:43

## 2024-08-21 ASSESSMENT — ACTIVITIES OF DAILY LIVING (ADL)
ADLS_ACUITY_SCORE: 26
ADLS_ACUITY_SCORE: 27
ADLS_ACUITY_SCORE: 26
ADLS_ACUITY_SCORE: 27
ADLS_ACUITY_SCORE: 27
ADLS_ACUITY_SCORE: 26
ADLS_ACUITY_SCORE: 27
ADLS_ACUITY_SCORE: 26
ADLS_ACUITY_SCORE: 27

## 2024-08-21 NOTE — PLAN OF CARE
Problem: Adult Inpatient Plan of Care  Goal: Optimal Comfort and Wellbeing  Intervention: Monitor Pain and Promote Comfort  Recent Flowsheet Documentation  Taken 8/21/2024 0744 by Radha Garcia RN  Pain Management Interventions:   medication (see MAR)   cold applied   Patient up with assist of one/walker and gait belt. Steady on feet. Pain managed with oxycodone and scheduled tylenol. Dressing left hip clean, dry and intact. VSS.

## 2024-08-21 NOTE — PROGRESS NOTES
"[facility] Orthopaedics Progress Note      Post-operative Day: 3 Days Post-Op    Procedure(s):  HEMIARTHROPLASTY, HIP, BIPOLAR, LEFT  Subjective:    Pt reports that her hip is sore but more tolerable today. Denies any new issues.     Chest pain, SOB:  no  Nausea, vomiting:  no  Lightheadedness, dizziness:  no  Neuro:  Patient denies new onset numbness or paresthesias      Objective:  Blood pressure (!) 163/75, pulse 79, temperature 97.8  F (36.6  C), temperature source Oral, resp. rate 18, height 1.676 m (5' 6\"), weight 67.3 kg (148 lb 5.9 oz), SpO2 96%.    Patient Vitals for the past 24 hrs:   BP Temp Temp src Pulse Resp SpO2   08/21/24 0736 (!) 163/75 97.8  F (36.6  C) Oral 79 18 96 %   08/20/24 2319 (!) 173/78 98.6  F (37  C) Oral 82 18 94 %   08/20/24 1550 (!) 151/62 97.1  F (36.2  C) Oral 72 16 96 %       Wt Readings from Last 4 Encounters:   08/18/24 67.3 kg (148 lb 5.9 oz)   10/30/18 73.8 kg (162 lb 11.2 oz)   11/05/09 66.2 kg (146 lb)       Gen: A&O x 3. NAD. Up to the recliner.   Wound status: Covered  Circulation, motion and sensation: Dorsiflexion/plantarflexion intact and equal bilaterally; distal lower extremity sensation is intact and equal bilaterally. Foot and toes are warm and well perfused.    Swelling: mild  Calf tenderness: calves are soft and non-tender bilaterally     Pertinent Labs   Lab Results: personally reviewed.     Recent Labs   Lab Test 08/20/24  0538 08/19/24  0537 08/18/24  0513 08/17/24  1926 08/16/24  1816   WBC  --   --  7.9 8.7 9.7   HGB 9.7* 10.0* 10.3* 9.9* 9.8*   HCT  --   --  31.4* 29.9* 29.9*   MCV  --   --  86 85 85   PLT  --   --  178 173 205   NA  --   --  137 134* 136       Plan:   Continue current cares and rehabilitation.  Anticoagulation protocol: Aspirin 81 mg BID  x 42  days  Pain medications:  oxycodone and tylenol  Weight bearing status:  WBAT  Disposition:  Orthopedically stable. Will follow peripherally for the remainder of her stay.              Report " completed by:  Dayne Oden PA-C  Date: 8/21/2024  Time: 8:59 AM

## 2024-08-21 NOTE — PLAN OF CARE
Physical Therapy Discharge Summary    Reason for therapy discharge:    Discharged to transitional care facility.    Progress towards therapy goal(s). See goals on Care Plan in [facility] electronic health record for goal details.  Goals partially met.  Barriers to achieving goals:   discharge from facility.    Therapy recommendation(s):    Continued therapy is recommended.  Rationale/Recommendations:  Recommend continued PT to increase indep and ease with mobility.

## 2024-08-21 NOTE — PROGRESS NOTES
Care Management Discharge Note    Discharge Date: 08/21/24     [facility]    Discharge Services: RN, PT/OT    Discharge DME: Walker    Discharge Transportation: family will provide    Private pay costs discussed: Not applicable    Does the patient's insurance plan have a 3 day qualifying hospital stay waiver?  No    PAS Confirmation Code: [id number]  Patient/family educated on Medicare website which has current facility and service quality ratings: yes    Education Provided on the Discharge Plan: Yes  Persons Notified of Discharge Plans: Patient and Daughter  Patient/Family in Agreement with the Plan: yes    Handoff Referral Completed: Yes    Additional Information:  Update received during IDT rounds. Informed Pt is medically ready for discharge today.     CM received TCU bed acceptance from [facility].     Met with the Patient to discuss TCU options. Pt and daughter accepting of bed at [facility]. Aware they have a private room available.     Daughter verbalized her ability to transport     Time--4:00pm via daughter        PLAN: [facility]  RN report: [phone]   fax: [phone]      JESUS MANUEL Menon  [facility] [phone]   [facility] [place]  [phone]

## 2024-08-21 NOTE — PLAN OF CARE
[facility] [facility] DISCHARGE NOTE    Patient discharged to transitional care unit at 4:10 PM via wheel chair. Accompanied by daughter and staff. Discharge instructions reviewed with patient, opportunity offered to ask questions. Prescriptions sent to patients [facility] pharmacy. Report given to Kaylan SANDHU at [facility].All belongings sent with patient.    Radha Garcia RN

## 2024-08-21 NOTE — PLAN OF CARE
Problem: Adult Inpatient Plan of Care  Goal: Plan of Care Review  Description: The Plan of Care Review/Shift note should be completed every shift.  The Outcome Evaluation is a brief statement about your assessment that the patient is improving, declining, or no change.  This information will be displayed automatically on your shift  note.  Outcome: Progressing  Flowsheets (Taken 8/21/2024 0313)  Plan of Care Reviewed With: patient  Overall Patient Progress: improving    Problem: Pain Acute  Goal: Optimal Pain Control and Function  Outcome: Progressing      Goal Outcome Evaluation:      Plan of Care Reviewed With: patient    Overall Patient Progress: improvingOverall Patient Progress: improving     Pt using scheduled tylenol & prn oxycodone 5mg for pain control. Ambulates in room to & from BR but continues to decline ambulation in hallway. Pt planning to discharge to TCU.

## 2024-08-21 NOTE — DISCHARGE SUMMARY
[facility]  Discharge Summary  Hospital Medicine       Date of Admission:  8/17/2024  Date of Discharge:  8/21/2024   Discharging Provider: Benja Lynn MD    Identification and Chief Compaint: Jeanna Smith is a 79 year old female who presented on 8/17/2024 with complaint of fall resulting in left hip pain.    Discharge Diagnoses   Closed displaced fracture of left femoral neck   Fall at home, initial encounter  S/p ORIF with left hip hemiarthroplasty 8/18/2024  UTI due to E coli  Hypothyroidism  Anemia, chronic, normocytic     Follow-ups Needed After Discharge   Follow-up Appointments     Follow Up and recommended labs and tests      Follow up with Dr.Andrea Barajas for your left bipolar hemiarthroplasty   at 2 weeks post op. Call [facility] scheduling at   [phone] to make an appointment. Call 's care team at   [phone] with questions.        Follow Up and recommended labs and tests      Follow up with Nursing home physician.  No follow up labs or test are   needed.          Hospital Course      Closed displaced fracture of left femoral neck   Fall at home, initial encounter  S/p ORIF with left hip hemiarthroplasty 8/18/2024    Patient presented with left hip pain after a fall on 8/17. She was using her walker, felt weak and lightheaded, and lost control of her walker falling on her left hip. She hit her head and had neck pain, but CT head and cervical spine negative.     X-ray femur/pelvis demonstrates fracture of the left femoral neck without evidence for intertrochanteric extension. Right hip negative for fracture.    Anesthesia placed a peripheral nerve block for pain control in the ER. Orthopedic surgery was consulted by the ED provider and she is s/p left hip hemiarthroplasty on 8/18.     Post op day #3. Pain control improved.     Continue pain control with scheduled acetaminophen and oxycodone prn.     Physical therapy, occupational  therapy, TCU for rehab     UTI due to E coli    The patient was seen 8/16 in the ED for evaluation of diarrhea and was found to have UTI, she was sent home on PO Bactrim.      Urine culture showed E. coli that is resistant to Bactrim, and ampicillin, sensitive to Macrobid and Cipro.    Patient has known history of allergy to penicillin, she declined IV antibiotics and would like oral instead. Started on nitrofurantoin on admission. Received also cefazolin IV periop which also covers the UTI. Continue nitrofurantoin 100 BID through 8/24/2024 to complete the course.      Hypothyroidism    Continue PTA [place] Thyroid     Anemia, chronic, normocytic    Appears to be chronic normocytic anemia with baseline hemoglobin 9-9.9.    Hemoglobin admission 9.9 at baseline. No current concern for bleeding. Hgb 9.7 post op.     Consultations This Hospital Stay   Orthopedic surgery    Ancillary Consultations This Hospital Stay   PHYSICAL THERAPY ADULT IP CONSULT  OCCUPATIONAL THERAPY ADULT IP CONSULT  CARE MANAGEMENT / SOCIAL WORK IP CONSULT  PHYSICAL THERAPY ADULT IP CONSULT  OCCUPATIONAL THERAPY ADULT IP CONSULT    Code Status   Full Code    The discharge plan was discussed with the patient, and she expressed understanding.     Time Spent on this Encounter   Total time on this discharge was < 30 minutes.       Benja Lynn MD  [facility]  ______________________________________________________________________    Physical Exam   Vital Signs: Temp: 97.8  F (36.6  C) Temp src: Oral BP: (!) 163/75 Pulse: 79   Resp: 18 SpO2: 96 % O2 Device: None (Room air)    Weight: 148 lbs 5.91 oz  Constitutional: alert and oriented, NAD, nontoxic  CV: Regular  Respiratory: CTA bilaterally  GI: Soft, non-tender   Skin: Warm and dry       Primary Care Physician   DAVID SIEGEL  [address]  [facility] [address]     Discharge Disposition   Discharged to short-term care facility  Condition at discharge:  Stable    Significant Results and Procedures   Results for orders placed or performed during the hospital encounter of 08/17/24   CT Head w/o Contrast    Narrative    EXAM: CT HEAD W/O CONTRAST  LOCATION: [facility]  DATE: 8/17/2024    INDICATION: Closed head injury status post fall.  COMPARISON: None.  TECHNIQUE: Routine CT Head without IV contrast. Multiplanar reformats. Dose reduction techniques were used.    FINDINGS:  INTRACRANIAL CONTENTS: No intracranial hemorrhage, extraaxial collection, or mass effect.  No CT evidence of acute infarct. Mild presumed chronic small vessel ischemic changes. Mild generalized volume loss. No hydrocephalus.     VISUALIZED ORBITS/SINUSES/MASTOIDS: No intraorbital abnormality. No paranasal sinus mucosal disease. No middle ear or mastoid effusion.    BONES/SOFT TISSUES: No acute abnormality.      Impression    IMPRESSION:  1.  No acute intracranial findings.   CT Cervical Spine w/o Contrast    Narrative    EXAM: CT CERVICAL SPINE W/O CONTRAST  LOCATION: [facility]  DATE: 8/17/2024    INDICATION: Neck pain s p fall  COMPARISON: None.  TECHNIQUE: Routine CT Cervical Spine without IV contrast. Multiplanar reformats. Dose reduction techniques were used.    FINDINGS:  VERTEBRA: Normal vertebral body heights. No fracture or posttraumatic subluxation. Minor anterolisthesis C4-C5 and C7-T1, minor retrolisthesis C5-C6.    CANAL/FORAMINA: At C3-4, severe bilateral foraminal stenosis. At C5-6, moderate central stenosis, severe right foraminal stenosis.    PARASPINAL: No extraspinal abnormality.      Impression    IMPRESSION:  1.  No fracture or posttraumatic subluxation.     Lumbar spine XR, 2-3 views    Narrative    EXAM: XR LUMBAR SPINE 2/3 VIEWS  LOCATION: [facility]  DATE/TIME: 8/17/2024 9:36 PM CDT    INDICATION: Lower back pain.   COMPARISON: None.  TECHNIQUE: Radiographs of lumbar spine in routine  projections.    FINDINGS:   Nomenclature based on 5 lumbar type vertebral bodies. Mild dextrocurvature L2-3, mild levocurvature L4. Normal sagittal alignment. Normal height of vertebral bodies. Moderate multi level interbody degeneration. Moderate lower lumbar facet arthropathy.   Unremarkable visualized bony pelvis. Abdominal soft tissues are unremarkable.            XR Pelvis 1/2 Views    Narrative    EXAM: XR PELVIS 1/2 VIEWS  LOCATION: [facility]  DATE: 8/17/2024    INDICATION: Left hip pain status post fall  COMPARISON: None.      Impression    IMPRESSION: Fracture of the left femoral neck without evidence for intertrochanteric extension. Right hip negative for fracture. Degenerative change both hips. Pelvis negative for fracture.   XR Femur Left 2 Views    Narrative    EXAM: XR FEMUR LEFT 2 VIEWS  LOCATION: [facility]  DATE: 8/17/2024    INDICATION: Left thigh pain status post fall  COMPARISON: None.      Impression    IMPRESSION: Fracture through the left femoral neck without evidence for intertrochanteric extension. No dislocation. Left femur otherwise negative.   POC US GUIDANCE NEEDLE PLACEMENT    Impression    Anatomy visualized well    X-ray Pelvis w/ unilateral hip left    Narrative    EXAM: XR PELVIS AND HIP LEFT 1 VIEW  LOCATION: [facility]  DATE: 8/18/2024    INDICATION: post surgical hip hemiarthroplasty  COMPARISON: None.      Impression    IMPRESSION: Postoperative changes left hip hemiarthroplasty. Components appear well seated. Postprocedural air around the left hip. Right hip negative for fracture with mild degenerative change. Visualized pelvis negative for fracture. Diffuse   demineralization.     Procedures  ORIF with left hip hemiarthroplasty 8/18/2024    Discharge Orders      Wound care (specify)    Site:   Left hip  Instructions:  You have an Aquacel dressing on your incision. Leave this dressing in place  until your first post op visit. It is ok to shower with the dressing uncovered; do not soak the area. If the dressing becomes loose or more than 50% saturated with drainage, contact your surgical team.     Follow Up and recommended labs and tests    Follow up with Dr.Andrea Barajas for your left bipolar hemiarthroplasty at 2 weeks post op. Call [facility] scheduling at [phone] to make an appointment. Call 's care team at [phone] with questions.     Activity - Up with assistive device     Weight bearing status    WBAT LLE     General info for SNF    Length of Stay Estimate: Short Term Care: Estimated # of Days <30  Condition at Discharge: Improving  Level of care:skilled   Rehabilitation Potential: Excellent  Admission H&P remains valid and up-to-date: Yes  Recent Chemotherapy: N/A  Use Nursing Home Standing Orders: Yes     Follow Up and recommended labs and tests    Follow up with Nursing home physician.  No follow up labs or test are needed.     Reason for your hospital stay    Fall with left hip fracture, s/p repair     Activity - Up with assistive device     Activity - Up with nursing assistance     Physical Therapy Adult Consult    Evaluate and treat as clinically indicated.    Reason:  Post op left hip ORIF     Occupational Therapy Adult Consult    Evaluate and treat as clinically indicated.    Reason:  ADLs     Hip precautions    No hip flexion past 90 degrees, no internal rotation of the hip, no adduction of the hip past neutral.     Walker DME    DME Documentation: Describe the reason for need to support medical necessity: Impaired gait status post hip surgery. I, the undersigned, certify that the above prescribed supplies are medically necessary for this patient and is both reasonable and necessary in reference to accepted standards of medical practice in the treatment of this patient's condition and is not prescribed as a convenience.     Diet    Follow this diet upon  discharge: Regular Diet Adult     Discharge Medications   Current Discharge Medication List        START taking these medications    Details   acetaminophen (TYLENOL) 325 MG tablet Take 3 tablets (975 mg) by mouth every 8 hours. For post op pain. May change to as needed when pain resolves.    Associated Diagnoses: Closed displaced fracture of left femoral neck (H)      aspirin 81 MG EC tablet Take 1 tablet (81 mg) by mouth 2 times daily    Comments: X 42 days post op  Associated Diagnoses: Closed displaced fracture of left femoral neck (H)      nitroFURantoin macrocrystal-monohydrate (MACROBID) 100 MG capsule Take 1 capsule (100 mg) by mouth every 12 hours. Through AM dose 8/24/2024    Associated Diagnoses: Acute cystitis without hematuria      oxyCODONE (ROXICODONE) 5 MG tablet Take 1-2 tablets (5-10 mg) by mouth every 4 hours as needed for moderate pain.  Qty: 26 tablet, Refills: 0    Comments: 1 tab for pain <=5, 2 for pain >=6  Associated Diagnoses: Closed displaced fracture of left femoral neck (H)      senna-docusate (SENOKOT-S/PERICOLACE) 8.6-50 MG tablet Take 1 tablet by mouth 2 times daily as needed for constipation.    Associated Diagnoses: Drug-induced constipation           CONTINUE these medications which have NOT CHANGED    Details   ARMOUR THYROID 60 MG OR TABS Take 60 mg by mouth daily Brand      buPROPion (WELLBUTRIN XL) 150 MG 24 hr tablet Take 1 tablet by mouth daily      carboxymethylcellulose PF (REFRESH LIQUIGEL) 1 % ophthalmic gel Place 1 drop into both eyes 2 times daily      Ferrous Sulfate 324 (65 Fe) MG TBEC Take 1 tablet by mouth daily.      Omega-3 Fatty Acids (OMEGA-3 PO) Take 1 capsule by mouth daily      pantoprazole (PROTONIX) 40 MG EC tablet Take 40 mg by mouth daily      vitamin B-Complex Take 1 tablet by mouth daily      VITAMIN D PO Take by mouth daily           STOP taking these medications       IRON PO Comments:   Reason for Stopping:         naproxen sodium 220 MG capsule  Comments:   Reason for Stopping:         potassium chloride gurmeet ER (KLOR-CON M20) 20 MEQ CR tablet Comments:   Reason for Stopping:         sulfamethoxazole-trimethoprim (BACTRIM DS) 800-160 MG tablet Comments:   Reason for Stopping:             Allergies   Allergies   Allergen Reactions    Darvocet [Acetaminophen]     Erythromycin Nausea    Pcn [Penicillins]

## 2024-08-22 ENCOUNTER — PATIENT OUTREACH (OUTPATIENT)
Dept: CARE COORDINATION | Facility: CLINIC | Age: 79
End: 2024-08-22
Payer: MEDICARE

## 2024-08-22 NOTE — PROGRESS NOTES
[facility]    Background: Transitional Care Management program identified per system criteria and reviewed by [facility] team for possible outreach.    Assessment: Upon chart review, CCRC Team member will not proceed with patient outreach related to this episode of Transitional Care Management program due to reason below:    Non-[facility] TCU: CCRC team member noted patient discharged to TCU/ARU/LTACH. Patient is not established with a [facility] currently supported by Primary Care-Care Coordination therefore handoff to Primary Care-Care Coordination is not appropriate at this time.    Plan: Transitional Care Management episode addressed appropriately per reason noted above.      AYLIN Hernandez  Connected Care Resource [place], [facility]    *Connected Care Resource Team does NOT follow patient ongoing. Referrals are identified based on internal discharge reports and the outreach is to ensure patient has an understanding of their discharge instructions.

## 2024-08-26 ENCOUNTER — TRANSITIONAL CARE UNIT VISIT (OUTPATIENT)
Dept: GERIATRICS | Facility: CLINIC | Age: 79
End: 2024-08-26
Payer: MEDICARE

## 2024-08-26 VITALS
WEIGHT: 142.9 LBS | HEIGHT: 66 IN | RESPIRATION RATE: 14 BRPM | HEART RATE: 81 BPM | SYSTOLIC BLOOD PRESSURE: 139 MMHG | BODY MASS INDEX: 22.97 KG/M2 | TEMPERATURE: 98.7 F | DIASTOLIC BLOOD PRESSURE: 73 MMHG | OXYGEN SATURATION: 95 %

## 2024-08-26 DIAGNOSIS — Z47.89 ORTHOPEDIC AFTERCARE: ICD-10-CM

## 2024-08-26 DIAGNOSIS — N30.00 ACUTE CYSTITIS WITHOUT HEMATURIA: ICD-10-CM

## 2024-08-26 DIAGNOSIS — Z79.899 POLYPHARMACY: ICD-10-CM

## 2024-08-26 DIAGNOSIS — S72.22XD CLOSED DISPLACED SUBTROCHANTERIC FRACTURE OF LEFT FEMUR WITH ROUTINE HEALING, SUBSEQUENT ENCOUNTER: ICD-10-CM

## 2024-08-26 DIAGNOSIS — S72.002A CLOSED DISPLACED FRACTURE OF LEFT FEMORAL NECK (H): ICD-10-CM

## 2024-08-26 DIAGNOSIS — E21.3 HYPERPARATHYROIDISM, UNSPECIFIED (H): ICD-10-CM
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
## 2024-08-26 PROCEDURE — 99310 SBSQ NF CARE HIGH MDM 45: CPT | Performed by: NURSE PRACTITIONER

## 2024-08-26 RX ORDER — OXYCODONE HYDROCHLORIDE 5 MG/1
5 TABLET ORAL 3 TIMES DAILY PRN
Status: SHIPPED
Start: 2024-08-26

## 2024-08-26 NOTE — PROGRESS NOTES
[facility] TCU Admission  PCP & CLINIC: DAVID SIEGEL, [address] [facility] [address]  Chief Complaint   Patient presents with    Hospital F/U    [place] MRN: [id number]. Place of Service where encounter took place:  [facility] (TCU) [[id number]] Jeanna Smith  is a 79 year old  (1945), admitted to the above facility from  [facility]. Hospital stay 8/17 through 8/22. Admitted to this facility for  rehab, medical management, and nursing care. HPI information obtained from: facility chart records, facility staff, patient report, [facility] chart review, and Care Everywhere [facility] chart review.     Brief Summary of Hospital Course: Conchis presented to [facility] on 8/17 after a fall at home. She was found to have a left femur fx. She underwent left hip hemiarthroplasty on 8/18. She was given aspirin x 6 wks, and was also found to have a UTI, discharging in Macrobid.     Updates since admission to transitional care unit: Conchis presented to TCU on 8/22 s/p the above hospitalization. Today, Conchis is feeling pretty good.  Her bowels are moving, she does not have any radiating pain, but occasionally has some moderate pain which is managed well.  She rarely has to use more than 1 oxycodone.  She just completed her antibiotics for UTI, and says she still has a little bit of odor in her urine.  She denies headache, dizziness, but does remember having some dizziness prior to her fall.  She denies shortness of breath, cough, fever, chest pain, but does report palpitations which have been going on for a while.  She reports palpitations several mornings every week, upon rising in the morning, which go away after she gets up and moving.  She has never seen a cardiologist, but she is told her primary care doctor about this before.    CODE STATUS/ADVANCE DIRECTIVES DISCUSSION: CPR/Full code . Patient's living condition: lives with spouse. ALLERGIES: Ciprofloxacin, Clindamycin,  Darvocet [acetaminophen], Erythromycin, and Penicillins PAST MEDICAL HISTORY:  has a past medical history of Breast cancer (H), Breast cyst, and Hypothyroidism.. PAST SURGICAL HISTORY:   has a past surgical history that includes tonsillectomy (1955); VAGINAL HYSTERECTOMY (1988-89); ANESTH,EXCIS RETROPHARYNG TUMOR (2005,2006,2008); gastric bypass (2004); rectocele repair (1990,1992); OOPH W/RADIC DISSECT FOR DEBULKING (1993); Hysterectomy; Oophorectomy; Breast Cyst Aspiration (Left, 10/2008); Biopsy breast (Left, 2008); Lumpectomy breast (Left, 2008); and Open reduction internal fixation hip bipolar (Left, 8/18/2024).. FAMILY HISTORY: family history includes Arthritis in her mother; Heart Disease in her mother; No Known Problems in her daughter, maternal aunt, maternal grandfather, maternal grandmother, paternal aunt, paternal grandfather, paternal grandmother, and sister; Respiratory in her father.. SOCIAL HISTORY:   reports that she does not drink alcohol and does not use drugs.  Post Discharge Medication Reconciliation Status: discharge medications reconciled and changed, per note/orders.  Current Outpatient Medications   Medication Sig Dispense Refill    oxyCODONE (ROXICODONE) 5 MG tablet Take 1 tablet (5 mg) by mouth 3 times daily as needed for severe pain.      acetaminophen (TYLENOL) 325 MG tablet Take 3 tablets (975 mg) by mouth every 8 hours. For post op pain. May change to as needed when pain resolves.      ARMOUR THYROID 60 MG OR TABS Take 60 mg by mouth daily Brand      aspirin 81 MG EC tablet Take 1 tablet (81 mg) by mouth 2 times daily      carboxymethylcellulose PF (REFRESH LIQUIGEL) 1 % ophthalmic gel Place 1 drop into both eyes 2 times daily      Ferrous Sulfate 324 (65 Fe) MG TBEC Take 1 tablet by mouth daily.      Omega-3 Fatty Acids (OMEGA-3 PO) Take 1 capsule by mouth daily      pantoprazole (PROTONIX) 40 MG EC tablet Take 20 mg by mouth daily.      senna-docusate (SENOKOT-S/PERICOLACE) 8.6-50 MG  "tablet Take 1 tablet by mouth 2 times daily as needed for constipation.      vitamin B-Complex Take 1 tablet by mouth daily      VITAMIN D PO Take 2,000 Units by mouth daily.       ROS: 10 point ROS of systems including Constitutional, Eyes, Respiratory, Cardiovascular, Gastroenterology, Genitourinary, Integumentary, Musculoskeletal, Psychiatric were all negative except for pertinent positives noted in my HPI.  Vitals: /73   Pulse 81   Temp 98.7  F (37.1  C)   Resp 14   Ht 1.676 m (5' 6\")   Wt 64.8 kg (142 lb 14.4 oz)   SpO2 95%   BMI 23.06 kg/m    Exam:  GENERAL APPEARANCE: Alert, in no distress, cooperative.   ENT: Mouth/posterior oropharynx intact w/ moist mucous membranes, hearing acuity [place].  EYES: EOM, conjunctivae, lids, pupils and irises normal, PERRL2.   RESP: Respiratory effort good, no respiratory distress, Lung sounds clear. On RA.   CV: Auscultation of heart reveals S1, S2, rate controlled and rhythm regular w/ scattered ectopy., no murmur, no rub or gallop, Edema trace LLE. Peripheral pulses are 2+.  ABDOMEN: Normal bowel sounds, soft, non-tender abdomen, and no masses palpated.  SKIN: Inspection/Palpation of skin and subcutaneous tissue baseline w/ fragility. No wounds/rashes noted except incision, which is not visible during our visit.   NEURO: CN II-XII at patient's baseline, sensation baseline PPS.  PSYCH: Insight, judgement, and memory are baseline, affect and mood are happy/engaged.    Lab/Diagnostic data: Recent labs in [facility] reviewed by me today.     ASSESSMENT/PLAN:  Fall, subsequent encounter  Closed displaced subtrochanteric fracture of left femur with routine healing, subsequent encounter  Orthopedic aftercare  Acute cystitis without hematuria  Polypharmacy  Acute on chronic. Complex/Tenuous.   Provider reviewed records from hospitalization, facility, and interpreted most recent imaging/lab work (CBC, A1c, UA/UC), and vital signs, each with unique characteristics requiring MDM. "   Provider discussed care and management with nursing, , and rehab team:  Rehab team is still evaluating Conchis, and have not cleared her for independent movements (though she is independently trying to do things and circumventing them).  She does confirm that she is maintaining her hip precautions.  Nursing reports that patient presented with conflicting Wellbutrin and vitamin D orders, they are requesting clarification.   reports that patient will likely discharge within a week or 2 back home where she has a renter.  Daughter is next of kin and close by in [place].  Conchis is her own decision maker.  Noting last Macrobid dose was 8/24.  Patient has a history of frequent UTIs, and would recommend at least a 1 week off of antibiotics before retesting.  Provider educated Conchis to continue to push fluids and have excellent hygiene.  Trend postoperative renal function and electrolytes given fluid push and recent antibiotic use.  Hip is healing well, patient not having significant pain, no paresthesias.  Patient had donated blood prior to her fall, and therefore was anemic preoperatively, and then more so postoperatively:  Follow-up with orthopedics, appointment made already by daughter.  Trend postoperative hemoglobin to ensure hemoglobin improving.  Bowels moving well.  Continue regimen.  Pain well-controlled, start oxycodone reduction.  Noting palpitations which have been going on for quite some time.  This was reported to her primary care doctor previously.  Most recent EKG (last week) shows normal sinus rhythm, but ectopy could be heard today on auscultation.  Will trend electrolytes as noted below.  Would recommend Zio patch or Holter monitor via PCP office upon discharge from TCU given this is not a new problem.  Conchis had stopped Wellbutrin prior to her surgery, and does not want this restarted.  She felt this would likely contribute to her risk of falling.  Provider concurs, as  Wellbutrin can cause tremors, insomnia, and is overall contraindicated in older adults.  Conchis feels that her mental health is well-controlled.  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  Will further clarify vitamin D dosing as noted below, and trial reduction and Protonix as noted below for the lowest, most effective dosing.  Provider personally initiated advance care planning and CODE STATUS discussion directly.  Detailed chest compressions, defibrillation, intubation, and Conchis feels that she should remain full code at this time.  POLST signed and order entered into epic.  Follow up w/in 1 week or as needed.    Orders:  Decrease all Oxycodone to 5mg PO TID PRN. Dx: severe pain.  Decrease Protonix to 20mg PO Qday. Dx: GERD.  Discontinue Wellbutrin.  Hgb + BMP x1 on 8/28. Dx: anemia, palpitations.   Vitamin D3 2000 units PO Qday. Dx: hyperparathyroidism.    Total time spent with patient visit was 45 min including patient visit, review of past records, and care coordination/discussion with parties as noted above.    Electronically signed by:  Dr. Olga Akhtar, APRN CNP DNP

## 2024-08-27 ENCOUNTER — LAB REQUISITION (OUTPATIENT)
Dept: LAB | Facility: CLINIC | Age: 79
End: 2024-08-27
Payer: MEDICARE

## 2024-08-27 VITALS
BODY MASS INDEX: 22.97 KG/M2 | OXYGEN SATURATION: 98 % | SYSTOLIC BLOOD PRESSURE: 147 MMHG | RESPIRATION RATE: 16 BRPM | HEIGHT: 66 IN | HEART RATE: 93 BPM | WEIGHT: 142.9 LBS | TEMPERATURE: 98.2 F | DIASTOLIC BLOOD PRESSURE: 80 MMHG

## 2024-08-27 DIAGNOSIS — D64.9 ANEMIA, UNSPECIFIED: ICD-10-CM

## 2024-08-28 ENCOUNTER — DISCHARGE SUMMARY NURSING HOME (OUTPATIENT)
Dept: GERIATRICS | Facility: CLINIC | Age: 79
End: 2024-08-28
Payer: MEDICARE

## 2024-08-28 DIAGNOSIS — N30.00 ACUTE CYSTITIS WITHOUT HEMATURIA: ICD-10-CM
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
## 2024-08-28 DIAGNOSIS — Z47.89 ORTHOPEDIC AFTERCARE: ICD-10-CM

## 2024-08-28 DIAGNOSIS — S72.22XD CLOSED DISPLACED SUBTROCHANTERIC FRACTURE OF LEFT FEMUR WITH ROUTINE HEALING, SUBSEQUENT ENCOUNTER: Primary | ICD-10-CM

## 2024-08-28 DIAGNOSIS — E21.3 HYPERPARATHYROIDISM, UNSPECIFIED (H): ICD-10-CM

## 2024-08-28 LAB
ANION GAP SERPL CALCULATED.3IONS-SCNC: 6 MMOL/L (ref 7–15)
BUN SERPL-MCNC: 14 MG/DL (ref 8–23)
CALCIUM SERPL-MCNC: 9.3 MG/DL (ref 8.8–10.4)
CHLORIDE SERPL-SCNC: 105 MMOL/L (ref 98–107)
CREAT SERPL-MCNC: 0.89 MG/DL (ref 0.51–0.95)
EGFRCR SERPLBLD CKD-EPI 2021: 65 ML/MIN/1.73M2
GLUCOSE SERPL-MCNC: 83 MG/DL (ref 70–99)
HCO3 SERPL-SCNC: 29 MMOL/L (ref 22–29)
HGB BLD-MCNC: 9.8 G/DL (ref 11.7–15.7)
POTASSIUM SERPL-SCNC: 3.6 MMOL/L (ref 3.4–5.3)
SODIUM SERPL-SCNC: 140 MMOL/L (ref 135–145)

## 2024-08-28 PROCEDURE — 99316 NF DSCHRG MGMT 30 MIN+: CPT | Performed by: NURSE PRACTITIONER

## 2024-08-28 PROCEDURE — 80048 BASIC METABOLIC PNL TOTAL CA: CPT | Mod: ORL | Performed by: FAMILY MEDICINE

## 2024-08-28 PROCEDURE — 82947 ASSAY GLUCOSE BLOOD QUANT: CPT | Performed by: FAMILY MEDICINE

## 2024-08-28 PROCEDURE — 36415 COLL VENOUS BLD VENIPUNCTURE: CPT | Mod: ORL | Performed by: FAMILY MEDICINE

## 2024-08-28 PROCEDURE — 80048 BASIC METABOLIC PNL TOTAL CA: CPT | Performed by: FAMILY MEDICINE

## 2024-08-28 PROCEDURE — P9604 ONE-WAY ALLOW PRORATED TRIP: HCPCS | Performed by: FAMILY MEDICINE

## 2024-08-28 PROCEDURE — 85018 HEMOGLOBIN: CPT | Performed by: FAMILY MEDICINE

## 2024-08-28 RX ORDER — NITROFURANTOIN 25; 75 MG/1; MG/1
100 CAPSULE ORAL 2 TIMES DAILY
Qty: 14 CAPSULE | Refills: 0 | Status: SHIPPED | OUTPATIENT
Start: 2024-08-28 | End: 2024-09-04

## 2024-08-28 NOTE — LETTER
8/28/2024      Jeanna Smith  [address]  [place] [address]        [facility] GERIATRICS DISCHARGE SUMMARY  PATIENT'S NAME: Jeanna Smith  YOB: 1945  MEDICAL RECORD NUMBER:  [id number]  Place of Service where encounter took place:  [facility] ON [facility] (TCU) [[id number]]    PRIMARY CARE PROVIDER AND CLINIC RESPONSIBLE AFTER TRANSFER:   DAVID SIEGEL, [address] / [facility] [address]    Non-[facility] Provider     Transferring providers: ARLENE Power CNP, Todd Tanner MD  Recent Hospitalization/ED:  [facility] stay 8/17/2024 to 8/21/2024.  Date of SNF Admission: August 21, 2024  Date of SNF (anticipated) Discharge: 8-28-24  Discharged to: previous independent home  Cognitive Scores: BIMS: 15/15  Physical Function:  ambulating with walker  DME: Walker    CODE STATUS/ADVANCE DIRECTIVES DISCUSSION:  Full Code   ALLERGIES: Ciprofloxacin, Clindamycin, Darvocet [acetaminophen], Erythromycin, and Penicillins    Brief Summary of Hospital Course: Conchis presented to [facility] on 8/17 after a fall at home. She was found to have a left femur fx. She underwent left hip hemiarthroplasty on 8/18. She was given aspirin x 6 wks, and was also found to have a UTI, discharging in Macrobid.     NURSING FACILITY COURSE   Medication Changes/Rationale:   8/23-oxycodone decreased to 5mg q 4 hours PRN, Omega3 discontinued, Protonix decreased to 20mg daily, B complex discontinued  8/26--Oxycodone decreased to 5mg TID prn, Wellbutrin discontinued (believe may contribute to falls.  Mental health good), added Vitramin D3 2000Units daily    Summary of nursing facility stay:   Closed displaced subtrochanteric fracture of left femur with routine healing, subsequent encounter  Fall, subsequent encounter  Orthopedic aftercare  Hip healing well, pain managed well with PRN Tylenol. Has used 1 Oxycodone nightly.  Initially thought she could go home without any  oxycodone. However, discussed option of having a few available for night, as activity (and therefore pain) may increase.  Patient in agreement.  Discussed proper disposal of unused narcotics.  Progressed well with therapies.  Ambulating with walker. Bowels moving well.  VTE prophylaxis with ASA x 6 weeks total  -continue physical therapy, OT through homecare  -Hgb stable, in 9's. PCP to follow   - follow with Ortho     Noted palpitations which have been going on for quite some time.  This was reported to her primary care doctor previously.  Most recent EKG (last week) shows normal sinus rhythm, but ectopy could be heard today on auscultation.Recommend Zio patch or Holter monitor   PCP to follow      Acute cystitis without hematuria  Completed 3 day course of macrobid on 8/24.  History of frequent UTIs.  Is symptomatic again.  Rx sent for Macrobid at home.  Educated to push fluids and excellent hygiene.  -PCP to follow    [id number], unspecified (H24)  Restarted Vitamin D3 2000 units daily  -PCP to follow    Discharge Medications:  MED REC REQUIRED  Post Medication Reconciliation Status: discharge medications reconciled and changed, per note/orders       Current Outpatient Medications   Medication Sig Dispense Refill     acetaminophen (TYLENOL) 325 MG tablet Take 3 tablets (975 mg) by mouth every 8 hours. For post op pain. May change to as needed when pain resolves.       ARMOUR THYROID 60 MG OR TABS Take 60 mg by mouth daily Brand       aspirin 81 MG EC tablet Take 1 tablet (81 mg) by mouth 2 times daily       carboxymethylcellulose PF (REFRESH LIQUIGEL) 1 % ophthalmic gel Place 1 drop into both eyes 2 times daily       Ferrous Sulfate 324 (65 Fe) MG TBEC Take 1 tablet by mouth daily.       Omega-3 Fatty Acids (OMEGA-3 PO) Take 1 capsule by mouth daily       oxyCODONE (ROXICODONE) 5 MG tablet Take 1 tablet (5 mg) by mouth 3 times daily as needed for severe pain.       pantoprazole (PROTONIX) 40 MG EC  "tablet Take 20 mg by mouth daily.       senna-docusate (SENOKOT-S/PERICOLACE) 8.6-50 MG tablet Take 1 tablet by mouth 2 times daily as needed for constipation.       vitamin B-Complex Take 1 tablet by mouth daily       VITAMIN D PO Take 2,000 Units by mouth daily.          Controlled medications:   Medication: Oxycodoned , 14 tabs given to patient at the time of discharge to take home     Past Medical History:   Past Medical History:   Diagnosis Date     Breast cancer (H)     2008 left      Breast cyst      Hypothyroidism      Physical Exam:   Vitals: BP (!) 147/80   Pulse 93   Temp 98.2  F (36.8  C)   Resp 16   Ht 1.676 m (5' 6\")   Wt 64.8 kg (142 lb 14.4 oz)   SpO2 98%   BMI 23.06 kg/m    BMI: Body mass index is 23.06 kg/m .  GENERAL APPEARANCE:  Alert, in no distress, cooperative  RESP:  lungs clear to auscultation , no respiratory distress  CV:  regular rate and rhythm, no murmur, rub, or gallop, peripheral edema trace+ in BLE  ABDOMEN:  bowel sounds normal, soft, non-tender  :    foul smelling urine  M/S:   limited ROM post op  SKIN:  wound healing well, no signs of infection .  NEURO:   Cranial nerves 2-12 are normal tested and grossly at patient's baseline  PSYCH:  normal insight, judgement and memory, affect and mood normal     SNF labs: Recent labs in [facility] reviewed by me today.  and Most Recent 3 CBC's:  Recent Labs   Lab Test 08/28/24  1003 08/20/24  0538 08/19/24  0537 08/18/24  0513 08/17/24 1926 08/16/24  1816   WBC  --   --   --  7.9 8.7 9.7   HGB 9.8* 9.7* 10.0* 10.3* 9.9* 9.8*   MCV  --   --   --  86 85 85   PLT  --   --   --  178 173 205     Most Recent 3 BMP's:  Recent Labs   Lab Test 08/28/24  1003 08/20/24  0538 08/19/24  0537 08/18/24  0513 08/17/24  1926     --   --  137 134*   POTASSIUM 3.6 4.1 4.3 4.2 4.0   CHLORIDE 105  --   --  104 101   CO2 29  --   --  25 25   BUN 14.0  --   --  11.9 12.7   CR 0.89  --   --  0.98* 0.98*   ANIONGAP 6*  --   --  8 8   CHANNING 9.3  --   --  9.0 " 8.5*   GLC 83 104* 127* 157* 154*       DISCHARGE PLAN:  Follow up labs: No labs orders/due  Medical Follow Up:      Follow up with primary care provider in 1-2 weeks  Follow up with orthopedics 2 weeks post surgery           Discharge Services: Home Care:  Occupational Therapy and Physical Therapy  Discharge Instructions Verbalized to Patient at Discharge:   Self care, pacing  UTI/prescription   Home care  Pain management, use of narcotics    TOTAL DISCHARGE TIME:     Electronically signed by:  ARLENE Power CNP     Documentation of Face to Face and Certification for Home Health Services    I certify that services are/were furnished while this patient was under the care of a physician and that a physician or an allowed non-physician practitioner (NPP), had a face-to-face encounter that meets the physician face-to-face encounter requirements. The encounter was in whole, or in part, related to the primary reason for home health. The patient is confined to his/her home and needs intermittent skilled nursing, physical therapy, speech-language pathology, or the continued need for occupational therapy. A plan of care has been established by a physician and is periodically reviewed by a physician.  Date of Face-to-Face Encounter: 8/28/2024.    I certify that, based on my findings, the following services are medically necessary home health services: Nursing, Occupational Therapy, and Physical Therapy.    My clinical findings support the need for the above skilled services because: Requires assistance of another person or specialized equipment to access medical services because patient: Range of motion limitations prevents ability to exit home safely...    Patient to re-establish plan of care with their PCP within 7-10 days after leaving the facility to reestablish care.  Medicare certified PECOS provider: ARLENE Power CNP  Date: August 29, 2024                Sincerely,        ARLENE Power  CNP

## 2024-10-23 ENCOUNTER — TELEPHONE (OUTPATIENT)
Dept: FAMILY MEDICINE | Facility: CLINIC | Age: 79
End: 2024-10-23
Payer: MEDICARE

## 2024-10-23 NOTE — TELEPHONE ENCOUNTER
Symptoms    Describe your symptoms: Pts daughter Shasta calling. NO C2C ON FILE. Did not disclose any information but she is worried about her mom. She wants to speak with a nurse in general about UTIs. She has had one since August.     Any pain: Yes: Pt called daughter today and told her that her bones hurt and she isnt going to work. Daughter is heading there. Informed her if needed take her into ER/Urgent care. She declined to wait for a nurse wants one to callback ASAP.     Have you been seen for this:  Yes: Has PCP elsewhere who has been dealing with this but daughter  wants to speak with a nurse here    Appointment offered?: No    Triage offered?: Yes: Declined to wait after 5 minutes waiting for a nurse      Preferred Pharmacy:   [facility] [address] [facility]  [address] [address] [address]  Phone: [phone] Fax: [phone]      Okay to leave a detailed message?: Yes at Other phone number:  [phone] Shasta Moreira on 10/23/2024 at 9:24 AM

## 2024-10-23 NOTE — TELEPHONE ENCOUNTER
"Per review of chart, this patient is not established with [facility] (appears followed by [facility]), and there is no consent to communicate on file.  Returned call to daughter Shasta, advised can take information, but can't give any regarding patient.  She understands.   She reports patient has had UTIs since August, has been on multiple antibiotics, but has \"never gotten rid of the UTI\".   Then yesterday she went to [facility], who prescribed Nitrofurantoin, which has worked for patient in the past.  Shasta is concerned because patient was telling her today that \"her bones hurt\".  Shasta is wanting to know, in general, what symptoms of sepsis may be, as she knows someone that  from this.   We reviewed potential symptoms of fever/chills, fast breathing or pulse, low blood pressure/dizziness, weakness, or change in mentation/confusion.   Shasta states that when she saw patient this morning, she didn't have fever/chills, ate and drank and seemed okay other than \"bones hurting\".   She will continue monitoring patient and get her into ER right away if noticing any worsening or additional symptoms. She expresses gratitude for the general information.     Sharon Alexander RN  [facility]      "

## 2025-04-15 ENCOUNTER — TELEPHONE (OUTPATIENT)
Dept: DERMATOLOGY | Facility: CLINIC | Age: 80
End: 2025-04-15
Payer: MEDICARE

## 2025-04-15 NOTE — TELEPHONE ENCOUNTER
Patient Details  Patient's Full Name  Jeanna Smith  Patient's Date of Birth  1945  Patient's Phone Number  [phone]  Patient's Email ID  [email or web address]  Has the patient visited [facility] in the past 3 years?  Yes  Address Details  Address  [address], [place]  [address]  Appointment Preferences  Reason for appointment  questionable spot on lower leg by ankle.  Preferred Provider  Zaida Rangel  Preferred Location  [facility]  Preferred Visit Type  In-person   Services   Do you need an  for your appointment?  No  Billing Preference  Which billing situation applies to the patient?  Patient has insurance  Insurance Information  Insurance Provider Name  [facility]  Member ID/ Policy Number  [id number]  Group Number  [id number]  Insurance Contact for Provider  [phone]  Policy Baptiste  Is the patient the Insurance Policy baptiste/subscriber?  Yes, patient is the policy baptiste  Callback Preferences  Could you share your preferred callback time with us?  No, I don't have a preference

## 2025-08-28 ENCOUNTER — ANCILLARY PROCEDURE (OUTPATIENT)
Dept: MAMMOGRAPHY | Facility: CLINIC | Age: 80
End: 2025-08-28
Attending: INTERNAL MEDICINE
Payer: MEDICARE

## 2025-08-28 DIAGNOSIS — Z12.31 SCREENING MAMMOGRAM, ENCOUNTER FOR: ICD-10-CM

## 2025-08-28 PROCEDURE — 77063 BREAST TOMOSYNTHESIS BI: CPT

## (undated) DEVICE — BONE CLEANING TIP INTERPULSE  0210-010-000

## (undated) DEVICE — SOL NACL 0.9% IRRIG 1000ML BOTTLE 07138-09

## (undated) DEVICE — PREP CHLORAPREP 26ML TINTED ORANGE  260815

## (undated) DEVICE — DRAPE SHEET REV FOLD 3/4 9349

## (undated) DEVICE — DRSG TEGADERM 6X8" 1628

## (undated) DEVICE — SOL WATER IRRIG 1000ML BOTTLE 07139-09

## (undated) DEVICE — POSITIONER ABDUCTION PILLOW FOAM MED FP-ABDUCTM

## (undated) DEVICE — BONE CEMENT PREP RESTRICTOR/BRUSH 121010

## (undated) DEVICE — DRSG AQUACEL AG 3.5X12" HYDROFIBER 420670

## (undated) DEVICE — SU VICRYL 0 CT-1 CR 8X27" UND JJ41G

## (undated) DEVICE — DRAPE IOBAN LG .375X23.5" 6648EZ

## (undated) DEVICE — SU PDO 1 STRATAFIX 36X36CM CTX TAPERPOINT SXPD2B405

## (undated) DEVICE — SUCTION IRR SYSTEM W/TIP INTERPULSE

## (undated) DEVICE — SPONGE LAP 18X18" 1515

## (undated) DEVICE — PACK TOTAL HIP W/POUCH RIVERSIDE LATEX FREE

## (undated) DEVICE — SU VICRYL 0 CT-1 36" J946H

## (undated) DEVICE — GLOVE BIOGEL PI ULTRATOUCH G SZ 6.5 42165

## (undated) DEVICE — SU VICRYL 2-0 CT-1 36" UND J945H

## (undated) DEVICE — GLOVE BIOGEL PI MICRO INDICATOR UNDERGLOVE SZ 6.5 48965

## (undated) DEVICE — SU VICRYL 0 CT-2 CR 8X18" J727D

## (undated) DEVICE — BONE CEMENT MIXEVAC HI VAC W/CARTRIDGE 0306-563-000

## (undated) DEVICE — BLADE SAW SAGITTAL STRK 25X90X1.37MM 4H SYS 6 6125-137-090

## (undated) DEVICE — IMM PILLOW ABDUCT HIP MED 0814-8033

## (undated) DEVICE — GOWN LG DISP 9515

## (undated) DEVICE — BONE CLEANING TIP INTERPULSE FEMORAL CANAL 0210-008-000

## (undated) RX ORDER — CEFAZOLIN SODIUM 1 G/3ML
INJECTION, POWDER, FOR SOLUTION INTRAMUSCULAR; INTRAVENOUS
Status: DISPENSED
Start: 2024-08-18

## (undated) RX ORDER — FENTANYL CITRATE-0.9 % NACL/PF 10 MCG/ML
PLASTIC BAG, INJECTION (ML) INTRAVENOUS
Status: DISPENSED
Start: 2024-08-18

## (undated) RX ORDER — ROPIVACAINE HYDROCHLORIDE 5 MG/ML
INJECTION, SOLUTION EPIDURAL; INFILTRATION; PERINEURAL
Status: DISPENSED
Start: 2024-08-17

## (undated) RX ORDER — DEXAMETHASONE SODIUM PHOSPHATE 10 MG/ML
INJECTION, SOLUTION INTRAMUSCULAR; INTRAVENOUS
Status: DISPENSED
Start: 2024-08-17

## (undated) RX ORDER — FENTANYL CITRATE 50 UG/ML
INJECTION, SOLUTION INTRAMUSCULAR; INTRAVENOUS
Status: DISPENSED
Start: 2024-08-18